# Patient Record
Sex: MALE | Race: WHITE | Employment: OTHER | URBAN - METROPOLITAN AREA
[De-identification: names, ages, dates, MRNs, and addresses within clinical notes are randomized per-mention and may not be internally consistent; named-entity substitution may affect disease eponyms.]

---

## 2019-01-01 ENCOUNTER — APPOINTMENT (OUTPATIENT)
Dept: GENERAL RADIOLOGY | Facility: CLINIC | Age: 84
DRG: 082 | End: 2019-01-01
Attending: INTERNAL MEDICINE
Payer: COMMERCIAL

## 2019-01-01 ENCOUNTER — APPOINTMENT (OUTPATIENT)
Dept: SPEECH THERAPY | Facility: CLINIC | Age: 84
DRG: 082 | End: 2019-01-01
Attending: HOSPITALIST
Payer: COMMERCIAL

## 2019-01-01 ENCOUNTER — APPOINTMENT (OUTPATIENT)
Dept: PHYSICAL THERAPY | Facility: CLINIC | Age: 84
DRG: 082 | End: 2019-01-01
Attending: HOSPITALIST
Payer: COMMERCIAL

## 2019-01-01 ENCOUNTER — APPOINTMENT (OUTPATIENT)
Dept: PHYSICAL THERAPY | Facility: CLINIC | Age: 84
DRG: 082 | End: 2019-01-01
Attending: PHYSICIAN ASSISTANT
Payer: COMMERCIAL

## 2019-01-01 ENCOUNTER — APPOINTMENT (OUTPATIENT)
Dept: OCCUPATIONAL THERAPY | Facility: CLINIC | Age: 84
DRG: 082 | End: 2019-01-01
Attending: HOSPITALIST
Payer: COMMERCIAL

## 2019-01-01 ENCOUNTER — APPOINTMENT (OUTPATIENT)
Dept: SPEECH THERAPY | Facility: CLINIC | Age: 84
DRG: 082 | End: 2019-01-01
Attending: PHYSICIAN ASSISTANT
Payer: COMMERCIAL

## 2019-01-01 ENCOUNTER — HOSPITAL ENCOUNTER (INPATIENT)
Facility: CLINIC | Age: 84
LOS: 1 days | DRG: 177 | End: 2019-06-02
Attending: EMERGENCY MEDICINE | Admitting: HOSPITALIST
Payer: COMMERCIAL

## 2019-01-01 ENCOUNTER — APPOINTMENT (OUTPATIENT)
Dept: OCCUPATIONAL THERAPY | Facility: CLINIC | Age: 84
DRG: 082 | End: 2019-01-01
Attending: PHYSICIAN ASSISTANT
Payer: COMMERCIAL

## 2019-01-01 ENCOUNTER — APPOINTMENT (OUTPATIENT)
Dept: CT IMAGING | Facility: CLINIC | Age: 84
DRG: 082 | End: 2019-01-01
Attending: PHYSICIAN ASSISTANT
Payer: COMMERCIAL

## 2019-01-01 ENCOUNTER — HOSPITAL ENCOUNTER (INPATIENT)
Facility: CLINIC | Age: 84
LOS: 15 days | Discharge: NURSING, REHAB OR LONG-TERM CARE | DRG: 082 | End: 2019-06-01
Attending: HOSPITALIST | Admitting: HOSPITALIST
Payer: COMMERCIAL

## 2019-01-01 ENCOUNTER — HOSPITAL ENCOUNTER (EMERGENCY)
Facility: CLINIC | Age: 84
Discharge: ANOTHER HOSPITAL | End: 2019-05-17
Attending: EMERGENCY MEDICINE | Admitting: EMERGENCY MEDICINE
Payer: COMMERCIAL

## 2019-01-01 ENCOUNTER — APPOINTMENT (OUTPATIENT)
Dept: INTERVENTIONAL RADIOLOGY/VASCULAR | Facility: CLINIC | Age: 84
DRG: 082 | End: 2019-01-01
Attending: INTERNAL MEDICINE
Payer: COMMERCIAL

## 2019-01-01 ENCOUNTER — APPOINTMENT (OUTPATIENT)
Dept: GENERAL RADIOLOGY | Facility: CLINIC | Age: 84
DRG: 177 | End: 2019-01-01
Attending: EMERGENCY MEDICINE
Payer: COMMERCIAL

## 2019-01-01 ENCOUNTER — APPOINTMENT (OUTPATIENT)
Dept: CT IMAGING | Facility: CLINIC | Age: 84
End: 2019-01-01
Attending: EMERGENCY MEDICINE
Payer: COMMERCIAL

## 2019-01-01 ENCOUNTER — APPOINTMENT (OUTPATIENT)
Dept: GENERAL RADIOLOGY | Facility: CLINIC | Age: 84
DRG: 082 | End: 2019-01-01
Attending: ORTHOPAEDIC SURGERY
Payer: COMMERCIAL

## 2019-01-01 ENCOUNTER — APPOINTMENT (OUTPATIENT)
Dept: CT IMAGING | Facility: CLINIC | Age: 84
DRG: 082 | End: 2019-01-01
Attending: HOSPITALIST
Payer: COMMERCIAL

## 2019-01-01 ENCOUNTER — APPOINTMENT (OUTPATIENT)
Dept: CT IMAGING | Facility: CLINIC | Age: 84
DRG: 082 | End: 2019-01-01
Attending: PSYCHIATRY & NEUROLOGY
Payer: COMMERCIAL

## 2019-01-01 VITALS
OXYGEN SATURATION: 100 % | RESPIRATION RATE: 8 BRPM | HEART RATE: 84 BPM | WEIGHT: 161.82 LBS | DIASTOLIC BLOOD PRESSURE: 54 MMHG | BODY MASS INDEX: 21.95 KG/M2 | TEMPERATURE: 98.4 F | SYSTOLIC BLOOD PRESSURE: 127 MMHG

## 2019-01-01 VITALS
TEMPERATURE: 97.7 F | WEIGHT: 165 LBS | RESPIRATION RATE: 23 BRPM | HEART RATE: 72 BPM | DIASTOLIC BLOOD PRESSURE: 84 MMHG | OXYGEN SATURATION: 92 % | SYSTOLIC BLOOD PRESSURE: 196 MMHG

## 2019-01-01 VITALS
OXYGEN SATURATION: 99 % | TEMPERATURE: 97.6 F | HEART RATE: 70 BPM | HEIGHT: 72 IN | SYSTOLIC BLOOD PRESSURE: 134 MMHG | BODY MASS INDEX: 21.8 KG/M2 | DIASTOLIC BLOOD PRESSURE: 67 MMHG | WEIGHT: 160.94 LBS | RESPIRATION RATE: 16 BRPM

## 2019-01-01 DIAGNOSIS — R06.03 RESPIRATORY DISTRESS: ICD-10-CM

## 2019-01-01 DIAGNOSIS — E11.9 DIABETES MELLITUS WITHOUT COMPLICATION (H): ICD-10-CM

## 2019-01-01 DIAGNOSIS — S06.5XAA SUBDURAL HEMATOMA (H): ICD-10-CM

## 2019-01-01 DIAGNOSIS — I10 BENIGN ESSENTIAL HYPERTENSION: ICD-10-CM

## 2019-01-01 DIAGNOSIS — M25.561 ACUTE PAIN OF RIGHT KNEE: ICD-10-CM

## 2019-01-01 DIAGNOSIS — I47.29 NON-SUSTAINED VENTRICULAR TACHYCARDIA (H): ICD-10-CM

## 2019-01-01 DIAGNOSIS — R09.02 HYPOXIA: ICD-10-CM

## 2019-01-01 DIAGNOSIS — S06.5XAA SUBDURAL HEMATOMA (H): Primary | ICD-10-CM

## 2019-01-01 LAB
ALBUMIN SERPL-MCNC: 2 G/DL (ref 3.4–5)
ALBUMIN SERPL-MCNC: 2.1 G/DL (ref 3.4–5)
ALBUMIN SERPL-MCNC: 2.7 G/DL (ref 3.4–5)
ALBUMIN SERPL-MCNC: 3.2 G/DL (ref 3.4–5)
ALBUMIN UR-MCNC: 200 MG/DL
ALP SERPL-CCNC: 151 U/L (ref 40–150)
ALP SERPL-CCNC: 64 U/L (ref 40–150)
ALP SERPL-CCNC: 82 U/L (ref 40–150)
ALT SERPL W P-5'-P-CCNC: 31 U/L (ref 0–70)
ALT SERPL W P-5'-P-CCNC: 31 U/L (ref 0–70)
ALT SERPL W P-5'-P-CCNC: 44 U/L (ref 0–70)
ANION GAP SERPL CALCULATED.3IONS-SCNC: 10 MMOL/L (ref 3–14)
ANION GAP SERPL CALCULATED.3IONS-SCNC: 10 MMOL/L (ref 3–14)
ANION GAP SERPL CALCULATED.3IONS-SCNC: 11 MMOL/L (ref 3–14)
ANION GAP SERPL CALCULATED.3IONS-SCNC: 12 MMOL/L (ref 3–14)
ANION GAP SERPL CALCULATED.3IONS-SCNC: 5 MMOL/L (ref 3–14)
ANION GAP SERPL CALCULATED.3IONS-SCNC: 6 MMOL/L (ref 3–14)
ANION GAP SERPL CALCULATED.3IONS-SCNC: 7 MMOL/L (ref 3–14)
ANION GAP SERPL CALCULATED.3IONS-SCNC: 7 MMOL/L (ref 3–14)
ANION GAP SERPL CALCULATED.3IONS-SCNC: 8 MMOL/L (ref 3–14)
APPEARANCE UR: CLEAR
APTT PPP: 39 SEC (ref 22–37)
AST SERPL W P-5'-P-CCNC: 35 U/L (ref 0–45)
AST SERPL W P-5'-P-CCNC: 38 U/L (ref 0–45)
AST SERPL W P-5'-P-CCNC: 93 U/L (ref 0–45)
BASOPHILS # BLD AUTO: 0 10E9/L (ref 0–0.2)
BASOPHILS NFR BLD AUTO: 0.1 %
BASOPHILS NFR BLD AUTO: 0.1 %
BASOPHILS NFR BLD AUTO: 0.2 %
BASOPHILS NFR BLD AUTO: 0.2 %
BILIRUB SERPL-MCNC: 1.7 MG/DL (ref 0.2–1.3)
BILIRUB SERPL-MCNC: 2.9 MG/DL (ref 0.2–1.3)
BILIRUB SERPL-MCNC: 3.4 MG/DL (ref 0.2–1.3)
BILIRUB UR QL STRIP: NEGATIVE
BUN SERPL-MCNC: 31 MG/DL (ref 7–30)
BUN SERPL-MCNC: 36 MG/DL (ref 7–30)
BUN SERPL-MCNC: 41 MG/DL (ref 7–30)
BUN SERPL-MCNC: 42 MG/DL (ref 7–30)
BUN SERPL-MCNC: 43 MG/DL (ref 7–30)
BUN SERPL-MCNC: 44 MG/DL (ref 7–30)
BUN SERPL-MCNC: 46 MG/DL (ref 7–30)
BUN SERPL-MCNC: 47 MG/DL (ref 7–30)
BUN SERPL-MCNC: 49 MG/DL (ref 7–30)
BUN SERPL-MCNC: 50 MG/DL (ref 7–30)
BUN SERPL-MCNC: 62 MG/DL (ref 7–30)
BUN SERPL-MCNC: 64 MG/DL (ref 7–30)
BUN SERPL-MCNC: 69 MG/DL (ref 7–30)
BUN SERPL-MCNC: 81 MG/DL (ref 7–30)
BUN SERPL-MCNC: 91 MG/DL (ref 7–30)
CALCIUM SERPL-MCNC: 8 MG/DL (ref 8.5–10.1)
CALCIUM SERPL-MCNC: 8 MG/DL (ref 8.5–10.1)
CALCIUM SERPL-MCNC: 8.1 MG/DL (ref 8.5–10.1)
CALCIUM SERPL-MCNC: 8.2 MG/DL (ref 8.5–10.1)
CALCIUM SERPL-MCNC: 8.4 MG/DL (ref 8.5–10.1)
CALCIUM SERPL-MCNC: 8.5 MG/DL (ref 8.5–10.1)
CALCIUM SERPL-MCNC: 8.5 MG/DL (ref 8.5–10.1)
CALCIUM SERPL-MCNC: 8.7 MG/DL (ref 8.5–10.1)
CALCIUM SERPL-MCNC: 8.7 MG/DL (ref 8.5–10.1)
CALCIUM SERPL-MCNC: 8.9 MG/DL (ref 8.5–10.1)
CALCIUM SERPL-MCNC: 9 MG/DL (ref 8.5–10.1)
CALCIUM SERPL-MCNC: 9.1 MG/DL (ref 8.5–10.1)
CALCIUM SERPL-MCNC: 9.7 MG/DL (ref 8.5–10.1)
CHLORIDE SERPL-SCNC: 103 MMOL/L (ref 94–109)
CHLORIDE SERPL-SCNC: 106 MMOL/L (ref 94–109)
CHLORIDE SERPL-SCNC: 108 MMOL/L (ref 94–109)
CHLORIDE SERPL-SCNC: 108 MMOL/L (ref 94–109)
CHLORIDE SERPL-SCNC: 111 MMOL/L (ref 94–109)
CHLORIDE SERPL-SCNC: 112 MMOL/L (ref 94–109)
CHLORIDE SERPL-SCNC: 114 MMOL/L (ref 94–109)
CHLORIDE SERPL-SCNC: 115 MMOL/L (ref 94–109)
CHLORIDE SERPL-SCNC: 117 MMOL/L (ref 94–109)
CHLORIDE SERPL-SCNC: 121 MMOL/L (ref 94–109)
CHLORIDE SERPL-SCNC: 122 MMOL/L (ref 94–109)
CHLORIDE SERPL-SCNC: 124 MMOL/L (ref 94–109)
CHLORIDE SERPL-SCNC: 126 MMOL/L (ref 94–109)
CK SERPL-CCNC: 2190 U/L (ref 30–300)
CK SERPL-CCNC: 824 U/L (ref 30–300)
CO2 BLDCOV-SCNC: 18 MMOL/L (ref 21–28)
CO2 BLDCOV-SCNC: 24 MMOL/L (ref 21–28)
CO2 SERPL-SCNC: 18 MMOL/L (ref 20–32)
CO2 SERPL-SCNC: 19 MMOL/L (ref 20–32)
CO2 SERPL-SCNC: 22 MMOL/L (ref 20–32)
CO2 SERPL-SCNC: 22 MMOL/L (ref 20–32)
CO2 SERPL-SCNC: 23 MMOL/L (ref 20–32)
CO2 SERPL-SCNC: 24 MMOL/L (ref 20–32)
CO2 SERPL-SCNC: 25 MMOL/L (ref 20–32)
CO2 SERPL-SCNC: 25 MMOL/L (ref 20–32)
CO2 SERPL-SCNC: 26 MMOL/L (ref 20–32)
CO2 SERPL-SCNC: 27 MMOL/L (ref 20–32)
CO2 SERPL-SCNC: 28 MMOL/L (ref 20–32)
COLOR UR AUTO: YELLOW
CREAT SERPL-MCNC: 1.71 MG/DL (ref 0.66–1.25)
CREAT SERPL-MCNC: 1.74 MG/DL (ref 0.66–1.25)
CREAT SERPL-MCNC: 1.77 MG/DL (ref 0.66–1.25)
CREAT SERPL-MCNC: 1.89 MG/DL (ref 0.66–1.25)
CREAT SERPL-MCNC: 1.96 MG/DL (ref 0.66–1.25)
CREAT SERPL-MCNC: 1.97 MG/DL (ref 0.66–1.25)
CREAT SERPL-MCNC: 1.98 MG/DL (ref 0.66–1.25)
CREAT SERPL-MCNC: 2.06 MG/DL (ref 0.66–1.25)
CREAT SERPL-MCNC: 2.1 MG/DL (ref 0.66–1.25)
CREAT SERPL-MCNC: 2.11 MG/DL (ref 0.66–1.25)
CREAT SERPL-MCNC: 2.15 MG/DL (ref 0.66–1.25)
CREAT SERPL-MCNC: 2.24 MG/DL (ref 0.66–1.25)
CREAT SERPL-MCNC: 2.26 MG/DL (ref 0.66–1.25)
CREAT SERPL-MCNC: 2.79 MG/DL (ref 0.66–1.25)
CREAT SERPL-MCNC: 3.13 MG/DL (ref 0.66–1.25)
DIFFERENTIAL METHOD BLD: ABNORMAL
EOSINOPHIL # BLD AUTO: 0 10E9/L (ref 0–0.7)
EOSINOPHIL # BLD AUTO: 0.1 10E9/L (ref 0–0.7)
EOSINOPHIL NFR BLD AUTO: 0 %
EOSINOPHIL NFR BLD AUTO: 1.2 %
ERYTHROCYTE [DISTWIDTH] IN BLOOD BY AUTOMATED COUNT: 13 % (ref 10–15)
ERYTHROCYTE [DISTWIDTH] IN BLOOD BY AUTOMATED COUNT: 13.1 % (ref 10–15)
ERYTHROCYTE [DISTWIDTH] IN BLOOD BY AUTOMATED COUNT: 13.6 % (ref 10–15)
ERYTHROCYTE [DISTWIDTH] IN BLOOD BY AUTOMATED COUNT: 13.7 % (ref 10–15)
ERYTHROCYTE [DISTWIDTH] IN BLOOD BY AUTOMATED COUNT: 13.8 % (ref 10–15)
ERYTHROCYTE [DISTWIDTH] IN BLOOD BY AUTOMATED COUNT: 13.9 % (ref 10–15)
ERYTHROCYTE [DISTWIDTH] IN BLOOD BY AUTOMATED COUNT: 14.3 % (ref 10–15)
ERYTHROCYTE [DISTWIDTH] IN BLOOD BY AUTOMATED COUNT: 14.4 % (ref 10–15)
GFR SERPL CREATININE-BSD FRML MDRD: 17 ML/MIN/{1.73_M2}
GFR SERPL CREATININE-BSD FRML MDRD: 19 ML/MIN/{1.73_M2}
GFR SERPL CREATININE-BSD FRML MDRD: 25 ML/MIN/{1.73_M2}
GFR SERPL CREATININE-BSD FRML MDRD: 25 ML/MIN/{1.73_M2}
GFR SERPL CREATININE-BSD FRML MDRD: 26 ML/MIN/{1.73_M2}
GFR SERPL CREATININE-BSD FRML MDRD: 27 ML/MIN/{1.73_M2}
GFR SERPL CREATININE-BSD FRML MDRD: 27 ML/MIN/{1.73_M2}
GFR SERPL CREATININE-BSD FRML MDRD: 28 ML/MIN/{1.73_M2}
GFR SERPL CREATININE-BSD FRML MDRD: 29 ML/MIN/{1.73_M2}
GFR SERPL CREATININE-BSD FRML MDRD: 31 ML/MIN/{1.73_M2}
GFR SERPL CREATININE-BSD FRML MDRD: 33 ML/MIN/{1.73_M2}
GFR SERPL CREATININE-BSD FRML MDRD: 34 ML/MIN/{1.73_M2}
GFR SERPL CREATININE-BSD FRML MDRD: 35 ML/MIN/{1.73_M2}
GLUCOSE BLDC GLUCOMTR-MCNC: 104 MG/DL (ref 70–99)
GLUCOSE BLDC GLUCOMTR-MCNC: 107 MG/DL (ref 70–99)
GLUCOSE BLDC GLUCOMTR-MCNC: 111 MG/DL (ref 70–99)
GLUCOSE BLDC GLUCOMTR-MCNC: 113 MG/DL (ref 70–99)
GLUCOSE BLDC GLUCOMTR-MCNC: 123 MG/DL (ref 70–99)
GLUCOSE BLDC GLUCOMTR-MCNC: 124 MG/DL (ref 70–99)
GLUCOSE BLDC GLUCOMTR-MCNC: 130 MG/DL (ref 70–99)
GLUCOSE BLDC GLUCOMTR-MCNC: 132 MG/DL (ref 70–99)
GLUCOSE BLDC GLUCOMTR-MCNC: 133 MG/DL (ref 70–99)
GLUCOSE BLDC GLUCOMTR-MCNC: 151 MG/DL (ref 70–99)
GLUCOSE BLDC GLUCOMTR-MCNC: 152 MG/DL (ref 70–99)
GLUCOSE BLDC GLUCOMTR-MCNC: 155 MG/DL (ref 70–99)
GLUCOSE BLDC GLUCOMTR-MCNC: 156 MG/DL (ref 70–99)
GLUCOSE BLDC GLUCOMTR-MCNC: 156 MG/DL (ref 70–99)
GLUCOSE BLDC GLUCOMTR-MCNC: 163 MG/DL (ref 70–99)
GLUCOSE BLDC GLUCOMTR-MCNC: 165 MG/DL (ref 70–99)
GLUCOSE BLDC GLUCOMTR-MCNC: 166 MG/DL (ref 70–99)
GLUCOSE BLDC GLUCOMTR-MCNC: 166 MG/DL (ref 70–99)
GLUCOSE BLDC GLUCOMTR-MCNC: 171 MG/DL (ref 70–99)
GLUCOSE BLDC GLUCOMTR-MCNC: 172 MG/DL (ref 70–99)
GLUCOSE BLDC GLUCOMTR-MCNC: 173 MG/DL (ref 70–99)
GLUCOSE BLDC GLUCOMTR-MCNC: 173 MG/DL (ref 70–99)
GLUCOSE BLDC GLUCOMTR-MCNC: 175 MG/DL (ref 70–99)
GLUCOSE BLDC GLUCOMTR-MCNC: 178 MG/DL (ref 70–99)
GLUCOSE BLDC GLUCOMTR-MCNC: 181 MG/DL (ref 70–99)
GLUCOSE BLDC GLUCOMTR-MCNC: 184 MG/DL (ref 70–99)
GLUCOSE BLDC GLUCOMTR-MCNC: 184 MG/DL (ref 70–99)
GLUCOSE BLDC GLUCOMTR-MCNC: 185 MG/DL (ref 70–99)
GLUCOSE BLDC GLUCOMTR-MCNC: 187 MG/DL (ref 70–99)
GLUCOSE BLDC GLUCOMTR-MCNC: 188 MG/DL (ref 70–99)
GLUCOSE BLDC GLUCOMTR-MCNC: 192 MG/DL (ref 70–99)
GLUCOSE BLDC GLUCOMTR-MCNC: 193 MG/DL (ref 70–99)
GLUCOSE BLDC GLUCOMTR-MCNC: 195 MG/DL (ref 70–99)
GLUCOSE BLDC GLUCOMTR-MCNC: 199 MG/DL (ref 70–99)
GLUCOSE BLDC GLUCOMTR-MCNC: 201 MG/DL (ref 70–99)
GLUCOSE BLDC GLUCOMTR-MCNC: 202 MG/DL (ref 70–99)
GLUCOSE BLDC GLUCOMTR-MCNC: 203 MG/DL (ref 70–99)
GLUCOSE BLDC GLUCOMTR-MCNC: 203 MG/DL (ref 70–99)
GLUCOSE BLDC GLUCOMTR-MCNC: 204 MG/DL (ref 70–99)
GLUCOSE BLDC GLUCOMTR-MCNC: 208 MG/DL (ref 70–99)
GLUCOSE BLDC GLUCOMTR-MCNC: 211 MG/DL (ref 70–99)
GLUCOSE BLDC GLUCOMTR-MCNC: 215 MG/DL (ref 70–99)
GLUCOSE BLDC GLUCOMTR-MCNC: 215 MG/DL (ref 70–99)
GLUCOSE BLDC GLUCOMTR-MCNC: 216 MG/DL (ref 70–99)
GLUCOSE BLDC GLUCOMTR-MCNC: 218 MG/DL (ref 70–99)
GLUCOSE BLDC GLUCOMTR-MCNC: 221 MG/DL (ref 70–99)
GLUCOSE BLDC GLUCOMTR-MCNC: 222 MG/DL (ref 70–99)
GLUCOSE BLDC GLUCOMTR-MCNC: 224 MG/DL (ref 70–99)
GLUCOSE BLDC GLUCOMTR-MCNC: 225 MG/DL (ref 70–99)
GLUCOSE BLDC GLUCOMTR-MCNC: 227 MG/DL (ref 70–99)
GLUCOSE BLDC GLUCOMTR-MCNC: 230 MG/DL (ref 70–99)
GLUCOSE BLDC GLUCOMTR-MCNC: 231 MG/DL (ref 70–99)
GLUCOSE BLDC GLUCOMTR-MCNC: 232 MG/DL (ref 70–99)
GLUCOSE BLDC GLUCOMTR-MCNC: 232 MG/DL (ref 70–99)
GLUCOSE BLDC GLUCOMTR-MCNC: 239 MG/DL (ref 70–99)
GLUCOSE BLDC GLUCOMTR-MCNC: 239 MG/DL (ref 70–99)
GLUCOSE BLDC GLUCOMTR-MCNC: 240 MG/DL (ref 70–99)
GLUCOSE BLDC GLUCOMTR-MCNC: 245 MG/DL (ref 70–99)
GLUCOSE BLDC GLUCOMTR-MCNC: 247 MG/DL (ref 70–99)
GLUCOSE BLDC GLUCOMTR-MCNC: 248 MG/DL (ref 70–99)
GLUCOSE BLDC GLUCOMTR-MCNC: 253 MG/DL (ref 70–99)
GLUCOSE BLDC GLUCOMTR-MCNC: 254 MG/DL (ref 70–99)
GLUCOSE BLDC GLUCOMTR-MCNC: 257 MG/DL (ref 70–99)
GLUCOSE BLDC GLUCOMTR-MCNC: 262 MG/DL (ref 70–99)
GLUCOSE BLDC GLUCOMTR-MCNC: 263 MG/DL (ref 70–99)
GLUCOSE BLDC GLUCOMTR-MCNC: 266 MG/DL (ref 70–99)
GLUCOSE BLDC GLUCOMTR-MCNC: 269 MG/DL (ref 70–99)
GLUCOSE BLDC GLUCOMTR-MCNC: 273 MG/DL (ref 70–99)
GLUCOSE BLDC GLUCOMTR-MCNC: 275 MG/DL (ref 70–99)
GLUCOSE BLDC GLUCOMTR-MCNC: 280 MG/DL (ref 70–99)
GLUCOSE BLDC GLUCOMTR-MCNC: 286 MG/DL (ref 70–99)
GLUCOSE BLDC GLUCOMTR-MCNC: 297 MG/DL (ref 70–99)
GLUCOSE BLDC GLUCOMTR-MCNC: 304 MG/DL (ref 70–99)
GLUCOSE BLDC GLUCOMTR-MCNC: 314 MG/DL (ref 70–99)
GLUCOSE BLDC GLUCOMTR-MCNC: 317 MG/DL (ref 70–99)
GLUCOSE BLDC GLUCOMTR-MCNC: 336 MG/DL (ref 70–99)
GLUCOSE BLDC GLUCOMTR-MCNC: 338 MG/DL (ref 70–99)
GLUCOSE BLDC GLUCOMTR-MCNC: 342 MG/DL (ref 70–99)
GLUCOSE BLDC GLUCOMTR-MCNC: 66 MG/DL (ref 70–99)
GLUCOSE BLDC GLUCOMTR-MCNC: 71 MG/DL (ref 70–99)
GLUCOSE BLDC GLUCOMTR-MCNC: 80 MG/DL (ref 70–99)
GLUCOSE BLDC GLUCOMTR-MCNC: 88 MG/DL (ref 70–99)
GLUCOSE BLDC GLUCOMTR-MCNC: 92 MG/DL (ref 70–99)
GLUCOSE BLDC GLUCOMTR-MCNC: 97 MG/DL (ref 70–99)
GLUCOSE SERPL-MCNC: 121 MG/DL (ref 70–99)
GLUCOSE SERPL-MCNC: 133 MG/DL (ref 70–99)
GLUCOSE SERPL-MCNC: 135 MG/DL (ref 70–99)
GLUCOSE SERPL-MCNC: 158 MG/DL (ref 70–99)
GLUCOSE SERPL-MCNC: 162 MG/DL (ref 70–99)
GLUCOSE SERPL-MCNC: 166 MG/DL (ref 70–99)
GLUCOSE SERPL-MCNC: 170 MG/DL (ref 70–99)
GLUCOSE SERPL-MCNC: 203 MG/DL (ref 70–99)
GLUCOSE SERPL-MCNC: 241 MG/DL (ref 70–99)
GLUCOSE SERPL-MCNC: 252 MG/DL (ref 70–99)
GLUCOSE SERPL-MCNC: 264 MG/DL (ref 70–99)
GLUCOSE SERPL-MCNC: 292 MG/DL (ref 70–99)
GLUCOSE SERPL-MCNC: 303 MG/DL (ref 70–99)
GLUCOSE SERPL-MCNC: 327 MG/DL (ref 70–99)
GLUCOSE SERPL-MCNC: 389 MG/DL (ref 70–99)
GLUCOSE UR STRIP-MCNC: >1000 MG/DL
HBA1C MFR BLD: 7.9 % (ref 0–5.6)
HBA1C MFR BLD: 8.5 % (ref 0–5.6)
HCT VFR BLD AUTO: 30.3 % (ref 40–53)
HCT VFR BLD AUTO: 31.8 % (ref 40–53)
HCT VFR BLD AUTO: 33.8 % (ref 40–53)
HCT VFR BLD AUTO: 34.4 % (ref 40–53)
HCT VFR BLD AUTO: 34.6 % (ref 40–53)
HCT VFR BLD AUTO: 38.1 % (ref 40–53)
HCT VFR BLD AUTO: 38.9 % (ref 40–53)
HCT VFR BLD AUTO: 44.9 % (ref 40–53)
HEMOCCULT STL QL: POSITIVE
HGB BLD-MCNC: 10.3 G/DL (ref 13.3–17.7)
HGB BLD-MCNC: 10.6 G/DL (ref 13.3–17.7)
HGB BLD-MCNC: 11.6 G/DL (ref 13.3–17.7)
HGB BLD-MCNC: 11.7 G/DL (ref 13.3–17.7)
HGB BLD-MCNC: 12 G/DL (ref 13.3–17.7)
HGB BLD-MCNC: 12.8 G/DL (ref 13.3–17.7)
HGB BLD-MCNC: 12.8 G/DL (ref 13.3–17.7)
HGB BLD-MCNC: 15.4 G/DL (ref 13.3–17.7)
HGB UR QL STRIP: ABNORMAL
HYALINE CASTS #/AREA URNS LPF: 1 /LPF (ref 0–2)
IMM GRANULOCYTES # BLD: 0.1 10E9/L (ref 0–0.4)
IMM GRANULOCYTES NFR BLD: 0.3 %
IMM GRANULOCYTES NFR BLD: 0.4 %
IMM GRANULOCYTES NFR BLD: 0.7 %
IMM GRANULOCYTES NFR BLD: 0.9 %
INR PPP: 1.08 (ref 0.86–1.14)
INR PPP: 1.17 (ref 0.86–1.14)
INR PPP: 1.21 (ref 0.86–1.14)
INR PPP: 1.21 (ref 0.86–1.14)
INR PPP: 2.48 (ref 0.86–1.14)
INTERPRETATION ECG - MUSE: NORMAL
INTERPRETATION ECG - MUSE: NORMAL
KETONES UR STRIP-MCNC: ABNORMAL MG/DL
LACTATE BLD-SCNC: 1 MMOL/L (ref 0.7–2)
LACTATE BLD-SCNC: 2.2 MMOL/L (ref 0.7–2.1)
LACTATE BLD-SCNC: 4.1 MMOL/L (ref 0.7–2)
LACTATE BLD-SCNC: 4.5 MMOL/L (ref 0.7–2.1)
LEUKOCYTE ESTERASE UR QL STRIP: NEGATIVE
LYMPHOCYTES # BLD AUTO: 0.2 10E9/L (ref 0.8–5.3)
LYMPHOCYTES # BLD AUTO: 0.5 10E9/L (ref 0.8–5.3)
LYMPHOCYTES # BLD AUTO: 0.7 10E9/L (ref 0.8–5.3)
LYMPHOCYTES # BLD AUTO: 1.1 10E9/L (ref 0.8–5.3)
LYMPHOCYTES NFR BLD AUTO: 1.9 %
LYMPHOCYTES NFR BLD AUTO: 11.8 %
LYMPHOCYTES NFR BLD AUTO: 2.5 %
LYMPHOCYTES NFR BLD AUTO: 4.5 %
MAGNESIUM SERPL-MCNC: 2.1 MG/DL (ref 1.6–2.3)
MAGNESIUM SERPL-MCNC: 2.3 MG/DL (ref 1.6–2.3)
MAGNESIUM SERPL-MCNC: 2.4 MG/DL (ref 1.6–2.3)
MCH RBC QN AUTO: 30.2 PG (ref 26.5–33)
MCH RBC QN AUTO: 30.4 PG (ref 26.5–33)
MCH RBC QN AUTO: 30.4 PG (ref 26.5–33)
MCH RBC QN AUTO: 30.5 PG (ref 26.5–33)
MCH RBC QN AUTO: 30.6 PG (ref 26.5–33)
MCH RBC QN AUTO: 30.6 PG (ref 26.5–33)
MCH RBC QN AUTO: 30.9 PG (ref 26.5–33)
MCH RBC QN AUTO: 30.9 PG (ref 26.5–33)
MCHC RBC AUTO-ENTMCNC: 32.9 G/DL (ref 31.5–36.5)
MCHC RBC AUTO-ENTMCNC: 33.3 G/DL (ref 31.5–36.5)
MCHC RBC AUTO-ENTMCNC: 33.6 G/DL (ref 31.5–36.5)
MCHC RBC AUTO-ENTMCNC: 33.7 G/DL (ref 31.5–36.5)
MCHC RBC AUTO-ENTMCNC: 34 G/DL (ref 31.5–36.5)
MCHC RBC AUTO-ENTMCNC: 34.3 G/DL (ref 31.5–36.5)
MCHC RBC AUTO-ENTMCNC: 34.6 G/DL (ref 31.5–36.5)
MCHC RBC AUTO-ENTMCNC: 34.7 G/DL (ref 31.5–36.5)
MCV RBC AUTO: 88 FL (ref 78–100)
MCV RBC AUTO: 89 FL (ref 78–100)
MCV RBC AUTO: 90 FL (ref 78–100)
MCV RBC AUTO: 91 FL (ref 78–100)
MCV RBC AUTO: 91 FL (ref 78–100)
MCV RBC AUTO: 92 FL (ref 78–100)
MONOCYTES # BLD AUTO: 0.2 10E9/L (ref 0–1.3)
MONOCYTES # BLD AUTO: 0.6 10E9/L (ref 0–1.3)
MONOCYTES # BLD AUTO: 1.3 10E9/L (ref 0–1.3)
MONOCYTES # BLD AUTO: 1.5 10E9/L (ref 0–1.3)
MONOCYTES NFR BLD AUTO: 1.6 %
MONOCYTES NFR BLD AUTO: 6.2 %
MONOCYTES NFR BLD AUTO: 7.7 %
MONOCYTES NFR BLD AUTO: 8.8 %
MRSA DNA SPEC QL NAA+PROBE: NEGATIVE
MUCOUS THREADS #/AREA URNS LPF: PRESENT /LPF
NEUTROPHILS # BLD AUTO: 11.1 10E9/L (ref 1.6–8.3)
NEUTROPHILS # BLD AUTO: 12.5 10E9/L (ref 1.6–8.3)
NEUTROPHILS # BLD AUTO: 16.8 10E9/L (ref 1.6–8.3)
NEUTROPHILS # BLD AUTO: 7.7 10E9/L (ref 1.6–8.3)
NEUTROPHILS NFR BLD AUTO: 79.7 %
NEUTROPHILS NFR BLD AUTO: 86.3 %
NEUTROPHILS NFR BLD AUTO: 88.9 %
NEUTROPHILS NFR BLD AUTO: 96 %
NITRATE UR QL: NEGATIVE
NRBC # BLD AUTO: 0 10*3/UL
NRBC BLD AUTO-RTO: 0 /100
NT-PROBNP SERPL-MCNC: 8100 PG/ML (ref 0–1800)
OSMOLALITY UR: 562 MMOL/KG (ref 100–1200)
PCO2 BLDV: 38 MM HG (ref 40–50)
PCO2 BLDV: 38 MM HG (ref 40–50)
PH BLDV: 7.3 PH (ref 7.32–7.43)
PH BLDV: 7.4 PH (ref 7.32–7.43)
PH UR STRIP: 5.5 PH (ref 5–7)
PHOSPHATE SERPL-MCNC: 3.1 MG/DL (ref 2.5–4.5)
PHOSPHATE SERPL-MCNC: 3.5 MG/DL (ref 2.5–4.5)
PLATELET # BLD AUTO: 129 10E9/L (ref 150–450)
PLATELET # BLD AUTO: 133 10E9/L (ref 150–450)
PLATELET # BLD AUTO: 141 10E9/L (ref 150–450)
PLATELET # BLD AUTO: 142 10E9/L (ref 150–450)
PLATELET # BLD AUTO: 156 10E9/L (ref 150–450)
PLATELET # BLD AUTO: 192 10E9/L (ref 150–450)
PLATELET # BLD AUTO: 207 10E9/L (ref 150–450)
PLATELET # BLD AUTO: 329 10E9/L (ref 150–450)
PO2 BLDV: 23 MM HG (ref 25–47)
PO2 BLDV: 37 MM HG (ref 25–47)
POTASSIUM SERPL-SCNC: 3.6 MMOL/L (ref 3.4–5.3)
POTASSIUM SERPL-SCNC: 3.7 MMOL/L (ref 3.4–5.3)
POTASSIUM SERPL-SCNC: 3.8 MMOL/L (ref 3.4–5.3)
POTASSIUM SERPL-SCNC: 3.9 MMOL/L (ref 3.4–5.3)
POTASSIUM SERPL-SCNC: 3.9 MMOL/L (ref 3.4–5.3)
POTASSIUM SERPL-SCNC: 4 MMOL/L (ref 3.4–5.3)
POTASSIUM SERPL-SCNC: 4.1 MMOL/L (ref 3.4–5.3)
POTASSIUM SERPL-SCNC: 4.1 MMOL/L (ref 3.4–5.3)
POTASSIUM SERPL-SCNC: 4.2 MMOL/L (ref 3.4–5.3)
POTASSIUM SERPL-SCNC: 4.9 MMOL/L (ref 3.4–5.3)
POTASSIUM SERPL-SCNC: 5.3 MMOL/L (ref 3.4–5.3)
PROT SERPL-MCNC: 6 G/DL (ref 6.8–8.8)
PROT SERPL-MCNC: 7.4 G/DL (ref 6.8–8.8)
PROT SERPL-MCNC: 8 G/DL (ref 6.8–8.8)
RBC # BLD AUTO: 3.37 10E12/L (ref 4.4–5.9)
RBC # BLD AUTO: 3.49 10E12/L (ref 4.4–5.9)
RBC # BLD AUTO: 3.79 10E12/L (ref 4.4–5.9)
RBC # BLD AUTO: 3.81 10E12/L (ref 4.4–5.9)
RBC # BLD AUTO: 3.92 10E12/L (ref 4.4–5.9)
RBC # BLD AUTO: 4.19 10E12/L (ref 4.4–5.9)
RBC # BLD AUTO: 4.24 10E12/L (ref 4.4–5.9)
RBC # BLD AUTO: 4.98 10E12/L (ref 4.4–5.9)
RBC #/AREA URNS AUTO: 3 /HPF (ref 0–2)
SAO2 % BLDV FROM PO2: 42 %
SAO2 % BLDV FROM PO2: 64 %
SODIUM SERPL-SCNC: 136 MMOL/L (ref 133–144)
SODIUM SERPL-SCNC: 140 MMOL/L (ref 133–144)
SODIUM SERPL-SCNC: 140 MMOL/L (ref 133–144)
SODIUM SERPL-SCNC: 141 MMOL/L (ref 133–144)
SODIUM SERPL-SCNC: 141 MMOL/L (ref 133–144)
SODIUM SERPL-SCNC: 144 MMOL/L (ref 133–144)
SODIUM SERPL-SCNC: 144 MMOL/L (ref 133–144)
SODIUM SERPL-SCNC: 145 MMOL/L (ref 133–144)
SODIUM SERPL-SCNC: 146 MMOL/L (ref 133–144)
SODIUM SERPL-SCNC: 146 MMOL/L (ref 133–144)
SODIUM SERPL-SCNC: 147 MMOL/L (ref 133–144)
SODIUM SERPL-SCNC: 148 MMOL/L (ref 133–144)
SODIUM SERPL-SCNC: 148 MMOL/L (ref 133–144)
SODIUM SERPL-SCNC: 149 MMOL/L (ref 133–144)
SODIUM SERPL-SCNC: 150 MMOL/L (ref 133–144)
SODIUM SERPL-SCNC: 151 MMOL/L (ref 133–144)
SODIUM SERPL-SCNC: 152 MMOL/L (ref 133–144)
SODIUM SERPL-SCNC: 154 MMOL/L (ref 133–144)
SODIUM SERPL-SCNC: 155 MMOL/L (ref 133–144)
SODIUM SERPL-SCNC: 156 MMOL/L (ref 133–144)
SODIUM UR-SCNC: 83 MMOL/L
SOURCE: ABNORMAL
SP GR UR STRIP: 1.02 (ref 1–1.03)
SPECIMEN SOURCE: NORMAL
SQUAMOUS #/AREA URNS AUTO: <1 /HPF (ref 0–1)
TROPONIN I BLD-MCNC: 0.15 UG/L (ref 0–0.08)
TROPONIN I SERPL-MCNC: 0.18 UG/L (ref 0–0.04)
TSH SERPL DL<=0.005 MIU/L-ACNC: 2.42 MU/L (ref 0.4–4)
UROBILINOGEN UR STRIP-MCNC: NORMAL MG/DL (ref 0–2)
WBC # BLD AUTO: 10.7 10E9/L (ref 4–11)
WBC # BLD AUTO: 11.4 10E9/L (ref 4–11)
WBC # BLD AUTO: 14.5 10E9/L (ref 4–11)
WBC # BLD AUTO: 18.9 10E9/L (ref 4–11)
WBC # BLD AUTO: 8.5 10E9/L (ref 4–11)
WBC # BLD AUTO: 9.1 10E9/L (ref 4–11)
WBC # BLD AUTO: 9.6 10E9/L (ref 4–11)
WBC # BLD AUTO: 9.8 10E9/L (ref 4–11)
WBC #/AREA URNS AUTO: 2 /HPF (ref 0–5)

## 2019-01-01 PROCEDURE — 25000132 ZZH RX MED GY IP 250 OP 250 PS 637: Performed by: HOSPITALIST

## 2019-01-01 PROCEDURE — 25800025 ZZH RX 258: Performed by: INTERNAL MEDICINE

## 2019-01-01 PROCEDURE — 97530 THERAPEUTIC ACTIVITIES: CPT | Mod: GO

## 2019-01-01 PROCEDURE — 36415 COLL VENOUS BLD VENIPUNCTURE: CPT | Performed by: INTERNAL MEDICINE

## 2019-01-01 PROCEDURE — 27210429 ZZH NUTRITION PRODUCT INTERMEDIATE LITER

## 2019-01-01 PROCEDURE — 25000132 ZZH RX MED GY IP 250 OP 250 PS 637: Performed by: INTERNAL MEDICINE

## 2019-01-01 PROCEDURE — 27210915 ZZ H TUBE GASTRO CR4

## 2019-01-01 PROCEDURE — 97110 THERAPEUTIC EXERCISES: CPT | Mod: GP

## 2019-01-01 PROCEDURE — 12000000 ZZH R&B MED SURG/OB

## 2019-01-01 PROCEDURE — 25000131 ZZH RX MED GY IP 250 OP 636 PS 637: Performed by: INTERNAL MEDICINE

## 2019-01-01 PROCEDURE — 25800030 ZZH RX IP 258 OP 636: Performed by: INTERNAL MEDICINE

## 2019-01-01 PROCEDURE — C1769 GUIDE WIRE: HCPCS

## 2019-01-01 PROCEDURE — 83880 ASSAY OF NATRIURETIC PEPTIDE: CPT | Performed by: EMERGENCY MEDICINE

## 2019-01-01 PROCEDURE — 99233 SBSQ HOSP IP/OBS HIGH 50: CPT | Performed by: INTERNAL MEDICINE

## 2019-01-01 PROCEDURE — 80048 BASIC METABOLIC PNL TOTAL CA: CPT | Performed by: INTERNAL MEDICINE

## 2019-01-01 PROCEDURE — 27210905 ZZH KIT CR7

## 2019-01-01 PROCEDURE — 99232 SBSQ HOSP IP/OBS MODERATE 35: CPT | Performed by: NEUROLOGICAL SURGERY

## 2019-01-01 PROCEDURE — 25800030 ZZH RX IP 258 OP 636: Performed by: HOSPITALIST

## 2019-01-01 PROCEDURE — 83605 ASSAY OF LACTIC ACID: CPT | Mod: 91

## 2019-01-01 PROCEDURE — 80053 COMPREHEN METABOLIC PANEL: CPT | Performed by: EMERGENCY MEDICINE

## 2019-01-01 PROCEDURE — 92526 ORAL FUNCTION THERAPY: CPT | Mod: GN | Performed by: SPEECH-LANGUAGE PATHOLOGIST

## 2019-01-01 PROCEDURE — 97112 NEUROMUSCULAR REEDUCATION: CPT | Mod: GP

## 2019-01-01 PROCEDURE — 25000125 ZZHC RX 250: Performed by: EMERGENCY MEDICINE

## 2019-01-01 PROCEDURE — 99291 CRITICAL CARE FIRST HOUR: CPT | Mod: 25

## 2019-01-01 PROCEDURE — 36415 COLL VENOUS BLD VENIPUNCTURE: CPT | Performed by: PHYSICIAN ASSISTANT

## 2019-01-01 PROCEDURE — 25000128 H RX IP 250 OP 636

## 2019-01-01 PROCEDURE — 97530 THERAPEUTIC ACTIVITIES: CPT | Mod: GP

## 2019-01-01 PROCEDURE — 99239 HOSP IP/OBS DSCHRG MGMT >30: CPT | Performed by: INTERNAL MEDICINE

## 2019-01-01 PROCEDURE — 44500 INTRO GASTROINTESTINAL TUBE: CPT

## 2019-01-01 PROCEDURE — 25000555 ZZHC RX FACTOR IP 250 OP 636: Performed by: EMERGENCY MEDICINE

## 2019-01-01 PROCEDURE — 27210742 ZZH CATH CR1

## 2019-01-01 PROCEDURE — 97535 SELF CARE MNGMENT TRAINING: CPT | Mod: GO | Performed by: OCCUPATIONAL THERAPY ASSISTANT

## 2019-01-01 PROCEDURE — 96375 TX/PRO/DX INJ NEW DRUG ADDON: CPT

## 2019-01-01 PROCEDURE — 84443 ASSAY THYROID STIM HORMONE: CPT | Performed by: EMERGENCY MEDICINE

## 2019-01-01 PROCEDURE — 97530 THERAPEUTIC ACTIVITIES: CPT | Mod: GO | Performed by: OCCUPATIONAL THERAPY ASSISTANT

## 2019-01-01 PROCEDURE — 00000146 ZZHCL STATISTIC GLUCOSE BY METER IP

## 2019-01-01 PROCEDURE — 85027 COMPLETE CBC AUTOMATED: CPT | Performed by: INTERNAL MEDICINE

## 2019-01-01 PROCEDURE — 27210735 ZZH ACCESSORY CR12

## 2019-01-01 PROCEDURE — 74230 X-RAY XM SWLNG FUNCJ C+: CPT

## 2019-01-01 PROCEDURE — 97110 THERAPEUTIC EXERCISES: CPT | Mod: GO | Performed by: OCCUPATIONAL THERAPY ASSISTANT

## 2019-01-01 PROCEDURE — 99223 1ST HOSP IP/OBS HIGH 75: CPT | Mod: AI | Performed by: HOSPITALIST

## 2019-01-01 PROCEDURE — 85025 COMPLETE CBC W/AUTO DIFF WBC: CPT | Performed by: PHYSICIAN ASSISTANT

## 2019-01-01 PROCEDURE — 25000128 H RX IP 250 OP 636: Performed by: EMERGENCY MEDICINE

## 2019-01-01 PROCEDURE — 85610 PROTHROMBIN TIME: CPT | Performed by: INTERNAL MEDICINE

## 2019-01-01 PROCEDURE — 97535 SELF CARE MNGMENT TRAINING: CPT | Mod: GO

## 2019-01-01 PROCEDURE — 25000128 H RX IP 250 OP 636: Performed by: HOSPITALIST

## 2019-01-01 PROCEDURE — 73560 X-RAY EXAM OF KNEE 1 OR 2: CPT | Mod: RT

## 2019-01-01 PROCEDURE — 40000986 XR ABDOMEN 1 VW

## 2019-01-01 PROCEDURE — 84100 ASSAY OF PHOSPHORUS: CPT | Performed by: INTERNAL MEDICINE

## 2019-01-01 PROCEDURE — 25000125 ZZHC RX 250

## 2019-01-01 PROCEDURE — C9132 KCENTRA, PER I.U.: HCPCS | Performed by: EMERGENCY MEDICINE

## 2019-01-01 PROCEDURE — 40000141 ZZH STATISTIC PERIPHERAL IV START W/O US GUIDANCE

## 2019-01-01 PROCEDURE — 97112 NEUROMUSCULAR REEDUCATION: CPT | Mod: GO

## 2019-01-01 PROCEDURE — 99238 HOSP IP/OBS DSCHRG MGMT 30/<: CPT | Performed by: NURSE PRACTITIONER

## 2019-01-01 PROCEDURE — 0DH63UZ INSERTION OF FEEDING DEVICE INTO STOMACH, PERCUTANEOUS APPROACH: ICD-10-PCS | Performed by: RADIOLOGY

## 2019-01-01 PROCEDURE — 94660 CPAP INITIATION&MGMT: CPT

## 2019-01-01 PROCEDURE — 97535 SELF CARE MNGMENT TRAINING: CPT | Mod: GO | Performed by: OCCUPATIONAL THERAPIST

## 2019-01-01 PROCEDURE — 92526 ORAL FUNCTION THERAPY: CPT | Mod: GN

## 2019-01-01 PROCEDURE — 25000128 H RX IP 250 OP 636: Performed by: RADIOLOGY

## 2019-01-01 PROCEDURE — 83735 ASSAY OF MAGNESIUM: CPT | Performed by: INTERNAL MEDICINE

## 2019-01-01 PROCEDURE — 85025 COMPLETE CBC W/AUTO DIFF WBC: CPT | Performed by: EMERGENCY MEDICINE

## 2019-01-01 PROCEDURE — 70450 CT HEAD/BRAIN W/O DYE: CPT

## 2019-01-01 PROCEDURE — 83935 ASSAY OF URINE OSMOLALITY: CPT | Performed by: INTERNAL MEDICINE

## 2019-01-01 PROCEDURE — 93005 ELECTROCARDIOGRAM TRACING: CPT

## 2019-01-01 PROCEDURE — 81001 URINALYSIS AUTO W/SCOPE: CPT | Performed by: EMERGENCY MEDICINE

## 2019-01-01 PROCEDURE — 20000003 ZZH R&B ICU

## 2019-01-01 PROCEDURE — 25000131 ZZH RX MED GY IP 250 OP 636 PS 637: Performed by: PHYSICIAN ASSISTANT

## 2019-01-01 PROCEDURE — 84300 ASSAY OF URINE SODIUM: CPT | Performed by: INTERNAL MEDICINE

## 2019-01-01 PROCEDURE — 83605 ASSAY OF LACTIC ACID: CPT

## 2019-01-01 PROCEDURE — 84295 ASSAY OF SERUM SODIUM: CPT | Performed by: INTERNAL MEDICINE

## 2019-01-01 PROCEDURE — 99285 EMERGENCY DEPT VISIT HI MDM: CPT | Mod: 25

## 2019-01-01 PROCEDURE — 97530 THERAPEUTIC ACTIVITIES: CPT | Mod: GP | Performed by: PHYSICAL THERAPIST

## 2019-01-01 PROCEDURE — 40000275 ZZH STATISTIC RCP TIME EA 10 MIN

## 2019-01-01 PROCEDURE — 85025 COMPLETE CBC W/AUTO DIFF WBC: CPT | Performed by: INTERNAL MEDICINE

## 2019-01-01 PROCEDURE — 97116 GAIT TRAINING THERAPY: CPT | Mod: GP | Performed by: PHYSICAL THERAPIST

## 2019-01-01 PROCEDURE — 40000061 ZZH STATISTIC EEG TIME EA 10 MIN

## 2019-01-01 PROCEDURE — 85610 PROTHROMBIN TIME: CPT | Performed by: PHYSICIAN ASSISTANT

## 2019-01-01 PROCEDURE — 82803 BLOOD GASES ANY COMBINATION: CPT

## 2019-01-01 PROCEDURE — 82550 ASSAY OF CK (CPK): CPT | Performed by: EMERGENCY MEDICINE

## 2019-01-01 PROCEDURE — 87640 STAPH A DNA AMP PROBE: CPT | Performed by: PHYSICIAN ASSISTANT

## 2019-01-01 PROCEDURE — 99232 SBSQ HOSP IP/OBS MODERATE 35: CPT | Performed by: PSYCHIATRY & NEUROLOGY

## 2019-01-01 PROCEDURE — 82550 ASSAY OF CK (CPK): CPT | Performed by: PHYSICIAN ASSISTANT

## 2019-01-01 PROCEDURE — 85730 THROMBOPLASTIN TIME PARTIAL: CPT | Performed by: INTERNAL MEDICINE

## 2019-01-01 PROCEDURE — 25000125 ZZHC RX 250: Performed by: PHYSICIAN ASSISTANT

## 2019-01-01 PROCEDURE — 72125 CT NECK SPINE W/O DYE: CPT

## 2019-01-01 PROCEDURE — 25800030 ZZH RX IP 258 OP 636: Performed by: PHYSICIAN ASSISTANT

## 2019-01-01 PROCEDURE — 99232 SBSQ HOSP IP/OBS MODERATE 35: CPT | Performed by: INTERNAL MEDICINE

## 2019-01-01 PROCEDURE — 71045 X-RAY EXAM CHEST 1 VIEW: CPT

## 2019-01-01 PROCEDURE — 97530 THERAPEUTIC ACTIVITIES: CPT | Mod: GO | Performed by: OCCUPATIONAL THERAPIST

## 2019-01-01 PROCEDURE — 92610 EVALUATE SWALLOWING FUNCTION: CPT | Mod: GN

## 2019-01-01 PROCEDURE — 97161 PT EVAL LOW COMPLEX 20 MIN: CPT | Mod: GP

## 2019-01-01 PROCEDURE — 97110 THERAPEUTIC EXERCISES: CPT | Mod: GP | Performed by: PHYSICAL THERAPIST

## 2019-01-01 PROCEDURE — 25800030 ZZH RX IP 258 OP 636: Performed by: EMERGENCY MEDICINE

## 2019-01-01 PROCEDURE — 96365 THER/PROPH/DIAG IV INF INIT: CPT

## 2019-01-01 PROCEDURE — 83036 HEMOGLOBIN GLYCOSYLATED A1C: CPT | Performed by: PHYSICIAN ASSISTANT

## 2019-01-01 PROCEDURE — 85610 PROTHROMBIN TIME: CPT | Performed by: EMERGENCY MEDICINE

## 2019-01-01 PROCEDURE — 99207 ZZC CDG-MDM COMPONENT: MEETS MODERATE - UP CODED: CPT | Performed by: INTERNAL MEDICINE

## 2019-01-01 PROCEDURE — 83605 ASSAY OF LACTIC ACID: CPT | Performed by: EMERGENCY MEDICINE

## 2019-01-01 PROCEDURE — 80069 RENAL FUNCTION PANEL: CPT | Performed by: INTERNAL MEDICINE

## 2019-01-01 PROCEDURE — 97116 GAIT TRAINING THERAPY: CPT | Mod: GP

## 2019-01-01 PROCEDURE — 99207 ZZC NON-BILLABLE SERV PER CHARTING: CPT | Performed by: PHYSICIAN ASSISTANT

## 2019-01-01 PROCEDURE — 96361 HYDRATE IV INFUSION ADD-ON: CPT

## 2019-01-01 PROCEDURE — 80053 COMPREHEN METABOLIC PANEL: CPT | Performed by: PHYSICIAN ASSISTANT

## 2019-01-01 PROCEDURE — 49440 PLACE GASTROSTOMY TUBE PERC: CPT

## 2019-01-01 PROCEDURE — 70450 CT HEAD/BRAIN W/O DYE: CPT | Mod: 77

## 2019-01-01 PROCEDURE — 84484 ASSAY OF TROPONIN QUANT: CPT

## 2019-01-01 PROCEDURE — 99291 CRITICAL CARE FIRST HOUR: CPT | Performed by: PSYCHIATRY & NEUROLOGY

## 2019-01-01 PROCEDURE — 99207 ZZC APP CREDIT; MD BILLING SHARED VISIT: CPT | Performed by: HOSPITALIST

## 2019-01-01 PROCEDURE — 83605 ASSAY OF LACTIC ACID: CPT | Performed by: PHYSICIAN ASSISTANT

## 2019-01-01 PROCEDURE — 92611 MOTION FLUOROSCOPY/SWALLOW: CPT | Mod: GN | Performed by: SPEECH-LANGUAGE PATHOLOGIST

## 2019-01-01 PROCEDURE — 76604 US EXAM CHEST: CPT

## 2019-01-01 PROCEDURE — 95951 ZZHC EEG VIDEO EACH 24 HR: CPT

## 2019-01-01 PROCEDURE — 25800029 ZZH RX IP 258 OP 250: Performed by: INTERNAL MEDICINE

## 2019-01-01 PROCEDURE — G0463 HOSPITAL OUTPT CLINIC VISIT: HCPCS

## 2019-01-01 PROCEDURE — 82272 OCCULT BLD FECES 1-3 TESTS: CPT | Performed by: EMERGENCY MEDICINE

## 2019-01-01 PROCEDURE — 99233 SBSQ HOSP IP/OBS HIGH 50: CPT | Mod: GC | Performed by: PSYCHIATRY & NEUROLOGY

## 2019-01-01 PROCEDURE — 84484 ASSAY OF TROPONIN QUANT: CPT | Performed by: EMERGENCY MEDICINE

## 2019-01-01 PROCEDURE — 25000125 ZZHC RX 250: Performed by: HOSPITALIST

## 2019-01-01 PROCEDURE — 92611 MOTION FLUOROSCOPY/SWALLOW: CPT | Mod: GN

## 2019-01-01 PROCEDURE — 83735 ASSAY OF MAGNESIUM: CPT | Performed by: EMERGENCY MEDICINE

## 2019-01-01 PROCEDURE — 96374 THER/PROPH/DIAG INJ IV PUSH: CPT

## 2019-01-01 PROCEDURE — 87641 MR-STAPH DNA AMP PROBE: CPT | Performed by: PHYSICIAN ASSISTANT

## 2019-01-01 PROCEDURE — 99292 CRITICAL CARE ADDL 30 MIN: CPT | Mod: GC | Performed by: PSYCHIATRY & NEUROLOGY

## 2019-01-01 PROCEDURE — 25800030 ZZH RX IP 258 OP 636: Performed by: STUDENT IN AN ORGANIZED HEALTH CARE EDUCATION/TRAINING PROGRAM

## 2019-01-01 PROCEDURE — 97166 OT EVAL MOD COMPLEX 45 MIN: CPT | Mod: GO | Performed by: OCCUPATIONAL THERAPIST

## 2019-01-01 PROCEDURE — 25000128 H RX IP 250 OP 636: Performed by: PHYSICIAN ASSISTANT

## 2019-01-01 RX ORDER — MORPHINE SULFATE 4 MG/ML
INJECTION, SOLUTION INTRAMUSCULAR; INTRAVENOUS
Status: COMPLETED
Start: 2019-01-01 | End: 2019-01-01

## 2019-01-01 RX ORDER — CHLORAL HYDRATE 500 MG
1 CAPSULE ORAL EVERY 24 HOURS
COMMUNITY
Start: 2010-11-17

## 2019-01-01 RX ORDER — PRAVASTATIN SODIUM 20 MG
20 TABLET ORAL DAILY
Status: DISCONTINUED | OUTPATIENT
Start: 2019-01-01 | End: 2019-01-01

## 2019-01-01 RX ORDER — ONDANSETRON 2 MG/ML
4 INJECTION INTRAMUSCULAR; INTRAVENOUS EVERY 6 HOURS PRN
Status: DISCONTINUED | OUTPATIENT
Start: 2019-01-01 | End: 2019-01-01

## 2019-01-01 RX ORDER — PROCHLORPERAZINE 25 MG
12.5 SUPPOSITORY, RECTAL RECTAL EVERY 12 HOURS PRN
Status: DISCONTINUED | OUTPATIENT
Start: 2019-01-01 | End: 2019-01-01

## 2019-01-01 RX ORDER — WARFARIN SODIUM 3 MG/1
3-4.5 TABLET ORAL DAILY
Status: ON HOLD | COMMUNITY
Start: 2015-04-09 | End: 2019-01-01

## 2019-01-01 RX ORDER — POLYETHYLENE GLYCOL 3350 17 G/17G
17 POWDER, FOR SOLUTION ORAL DAILY PRN
Status: DISCONTINUED | OUTPATIENT
Start: 2019-01-01 | End: 2019-01-01

## 2019-01-01 RX ORDER — NICOTINE POLACRILEX 4 MG
15-30 LOZENGE BUCCAL
Status: DISCONTINUED | OUTPATIENT
Start: 2019-01-01 | End: 2019-01-01

## 2019-01-01 RX ORDER — PROCHLORPERAZINE MALEATE 5 MG
5 TABLET ORAL EVERY 6 HOURS PRN
Status: DISCONTINUED | OUTPATIENT
Start: 2019-01-01 | End: 2019-01-01

## 2019-01-01 RX ORDER — NALOXONE HYDROCHLORIDE 0.4 MG/ML
.1-.4 INJECTION, SOLUTION INTRAMUSCULAR; INTRAVENOUS; SUBCUTANEOUS
Status: DISCONTINUED | OUTPATIENT
Start: 2019-01-01 | End: 2019-01-01 | Stop reason: HOSPADM

## 2019-01-01 RX ORDER — LEVOTHYROXINE SODIUM 100 UG/1
100 TABLET ORAL DAILY
Status: DISCONTINUED | OUTPATIENT
Start: 2019-01-01 | End: 2019-01-01

## 2019-01-01 RX ORDER — LIDOCAINE HYDROCHLORIDE 20 MG/ML
10 JELLY TOPICAL ONCE
Status: COMPLETED | OUTPATIENT
Start: 2019-01-01 | End: 2019-01-01

## 2019-01-01 RX ORDER — METOCLOPRAMIDE HYDROCHLORIDE 5 MG/ML
5 INJECTION INTRAMUSCULAR; INTRAVENOUS EVERY 6 HOURS PRN
Status: DISCONTINUED | OUTPATIENT
Start: 2019-01-01 | End: 2019-01-01

## 2019-01-01 RX ORDER — HYDRALAZINE HYDROCHLORIDE 20 MG/ML
10-20 INJECTION INTRAMUSCULAR; INTRAVENOUS
Status: DISCONTINUED | OUTPATIENT
Start: 2019-01-01 | End: 2019-01-01

## 2019-01-01 RX ORDER — LIDOCAINE 40 MG/G
CREAM TOPICAL
Status: DISCONTINUED | OUTPATIENT
Start: 2019-01-01 | End: 2019-01-01 | Stop reason: HOSPADM

## 2019-01-01 RX ORDER — MORPHINE SULFATE 2 MG/ML
1-2 INJECTION, SOLUTION INTRAMUSCULAR; INTRAVENOUS
Status: DISCONTINUED | OUTPATIENT
Start: 2019-01-01 | End: 2019-01-01

## 2019-01-01 RX ORDER — HYDROMORPHONE HYDROCHLORIDE 5 MG/5ML
.5-1 SOLUTION ORAL
Status: DISCONTINUED | OUTPATIENT
Start: 2019-01-01 | End: 2019-01-01 | Stop reason: HOSPADM

## 2019-01-01 RX ORDER — SODIUM CHLORIDE 9 MG/ML
INJECTION, SOLUTION INTRAVENOUS CONTINUOUS
Status: DISCONTINUED | OUTPATIENT
Start: 2019-01-01 | End: 2019-01-01

## 2019-01-01 RX ORDER — AMLODIPINE BESYLATE 2.5 MG/1
2.5 TABLET ORAL ONCE
Status: COMPLETED | OUTPATIENT
Start: 2019-01-01 | End: 2019-01-01

## 2019-01-01 RX ORDER — LIDOCAINE HYDROCHLORIDE 10 MG/ML
INJECTION, SOLUTION INFILTRATION; PERINEURAL
Status: COMPLETED
Start: 2019-01-01 | End: 2019-01-01

## 2019-01-01 RX ORDER — FLUMAZENIL 0.1 MG/ML
0.2 INJECTION, SOLUTION INTRAVENOUS
Status: DISCONTINUED | OUTPATIENT
Start: 2019-01-01 | End: 2019-01-01

## 2019-01-01 RX ORDER — BARIUM SULFATE 400 MG/ML
SUSPENSION ORAL ONCE
Status: COMPLETED | OUTPATIENT
Start: 2019-01-01 | End: 2019-01-01

## 2019-01-01 RX ORDER — ATROPINE SULFATE 10 MG/ML
1-2 SOLUTION/ DROPS OPHTHALMIC
Status: DISCONTINUED | OUTPATIENT
Start: 2019-01-01 | End: 2019-01-01 | Stop reason: HOSPADM

## 2019-01-01 RX ORDER — BENZTROPINE MESYLATE 0.5 MG/1
1-2 TABLET ORAL 3 TIMES DAILY PRN
Status: DISCONTINUED | OUTPATIENT
Start: 2019-01-01 | End: 2019-01-01

## 2019-01-01 RX ORDER — LABETALOL 20 MG/4 ML (5 MG/ML) INTRAVENOUS SYRINGE
10-20 EVERY 10 MIN PRN
Status: DISCONTINUED | OUTPATIENT
Start: 2019-01-01 | End: 2019-01-01

## 2019-01-01 RX ORDER — LORAZEPAM 0.5 MG/1
.5-1 TABLET ORAL
Status: DISCONTINUED | OUTPATIENT
Start: 2019-01-01 | End: 2019-01-01

## 2019-01-01 RX ORDER — AMOXICILLIN 250 MG
1 CAPSULE ORAL 2 TIMES DAILY PRN
Status: DISCONTINUED | OUTPATIENT
Start: 2019-01-01 | End: 2019-01-01

## 2019-01-01 RX ORDER — AMLODIPINE BESYLATE 5 MG/1
5 TABLET ORAL DAILY
Status: DISCONTINUED | OUTPATIENT
Start: 2019-01-01 | End: 2019-01-01 | Stop reason: HOSPADM

## 2019-01-01 RX ORDER — HYDROMORPHONE HYDROCHLORIDE 1 MG/ML
.3-.5 INJECTION, SOLUTION INTRAMUSCULAR; INTRAVENOUS; SUBCUTANEOUS
Status: DISCONTINUED | OUTPATIENT
Start: 2019-01-01 | End: 2019-01-01 | Stop reason: HOSPADM

## 2019-01-01 RX ORDER — NALOXONE HYDROCHLORIDE 0.4 MG/ML
.1-.4 INJECTION, SOLUTION INTRAMUSCULAR; INTRAVENOUS; SUBCUTANEOUS
Status: DISCONTINUED | OUTPATIENT
Start: 2019-01-01 | End: 2019-01-01

## 2019-01-01 RX ORDER — MANNITOL 20 G/100ML
75 INJECTION, SOLUTION INTRAVENOUS ONCE
Status: COMPLETED | OUTPATIENT
Start: 2019-01-01 | End: 2019-01-01

## 2019-01-01 RX ORDER — MINERAL OIL/HYDROPHIL PETROLAT
OINTMENT (GRAM) TOPICAL EVERY 8 HOURS PRN
Status: DISCONTINUED | OUTPATIENT
Start: 2019-01-01 | End: 2019-01-01 | Stop reason: HOSPADM

## 2019-01-01 RX ORDER — HYDROMORPHONE HYDROCHLORIDE 1 MG/ML
.3-.5 INJECTION, SOLUTION INTRAMUSCULAR; INTRAVENOUS; SUBCUTANEOUS
Status: DISCONTINUED | OUTPATIENT
Start: 2019-01-01 | End: 2019-01-01

## 2019-01-01 RX ORDER — ONDANSETRON 4 MG/1
4 TABLET, ORALLY DISINTEGRATING ORAL EVERY 6 HOURS PRN
Status: DISCONTINUED | OUTPATIENT
Start: 2019-01-01 | End: 2019-01-01 | Stop reason: HOSPADM

## 2019-01-01 RX ORDER — AMLODIPINE BESYLATE 5 MG/1
5 TABLET ORAL DAILY
DISCHARGE
Start: 2019-01-01

## 2019-01-01 RX ORDER — DEXTROSE MONOHYDRATE 50 MG/ML
INJECTION, SOLUTION INTRAVENOUS CONTINUOUS
Status: ACTIVE | OUTPATIENT
Start: 2019-01-01 | End: 2019-01-01

## 2019-01-01 RX ORDER — OXYCODONE HCL 5 MG/5 ML
5-10 SOLUTION, ORAL ORAL
Status: DISCONTINUED | OUTPATIENT
Start: 2019-01-01 | End: 2019-01-01

## 2019-01-01 RX ORDER — SODIUM CHLORIDE, SODIUM LACTATE, POTASSIUM CHLORIDE, CALCIUM CHLORIDE 600; 310; 30; 20 MG/100ML; MG/100ML; MG/100ML; MG/100ML
INJECTION, SOLUTION INTRAVENOUS CONTINUOUS
Status: DISCONTINUED | OUTPATIENT
Start: 2019-01-01 | End: 2019-01-01

## 2019-01-01 RX ORDER — PRAVASTATIN SODIUM 20 MG
20 TABLET ORAL DAILY
Status: ON HOLD | COMMUNITY
End: 2019-01-01

## 2019-01-01 RX ORDER — DEXTROSE MONOHYDRATE 50 MG/ML
INJECTION, SOLUTION INTRAVENOUS CONTINUOUS
Status: DISCONTINUED | OUTPATIENT
Start: 2019-01-01 | End: 2019-01-01

## 2019-01-01 RX ORDER — ACETAMINOPHEN 325 MG/1
650 TABLET ORAL EVERY 4 HOURS PRN
Status: DISCONTINUED | OUTPATIENT
Start: 2019-01-01 | End: 2019-01-01 | Stop reason: HOSPADM

## 2019-01-01 RX ORDER — ONDANSETRON 4 MG/1
4 TABLET, ORALLY DISINTEGRATING ORAL EVERY 6 HOURS PRN
Status: DISCONTINUED | OUTPATIENT
Start: 2019-01-01 | End: 2019-01-01

## 2019-01-01 RX ORDER — FENTANYL CITRATE 50 UG/ML
25-50 INJECTION, SOLUTION INTRAMUSCULAR; INTRAVENOUS EVERY 5 MIN PRN
Status: DISCONTINUED | OUTPATIENT
Start: 2019-01-01 | End: 2019-01-01

## 2019-01-01 RX ORDER — SODIUM CHLORIDE 9 MG/ML
1000 INJECTION, SOLUTION INTRAVENOUS CONTINUOUS
Status: DISCONTINUED | OUTPATIENT
Start: 2019-01-01 | End: 2019-01-01 | Stop reason: HOSPADM

## 2019-01-01 RX ORDER — METOCLOPRAMIDE 5 MG/1
5 TABLET ORAL EVERY 6 HOURS PRN
Status: DISCONTINUED | OUTPATIENT
Start: 2019-01-01 | End: 2019-01-01

## 2019-01-01 RX ORDER — SALIVA STIMULANT COMB. NO.3
1 SPRAY, NON-AEROSOL (ML) MUCOUS MEMBRANE
Status: DISCONTINUED | OUTPATIENT
Start: 2019-01-01 | End: 2019-01-01 | Stop reason: HOSPADM

## 2019-01-01 RX ORDER — MORPHINE SULFATE 4 MG/ML
4 INJECTION, SOLUTION INTRAMUSCULAR; INTRAVENOUS
Status: COMPLETED | OUTPATIENT
Start: 2019-01-01 | End: 2019-01-01

## 2019-01-01 RX ORDER — ACETAMINOPHEN 325 MG/1
650 TABLET ORAL EVERY 6 HOURS PRN
Status: DISCONTINUED | OUTPATIENT
Start: 2019-01-01 | End: 2019-01-01 | Stop reason: HOSPADM

## 2019-01-01 RX ORDER — DEXTROSE MONOHYDRATE 25 G/50ML
25-50 INJECTION, SOLUTION INTRAVENOUS
Status: DISCONTINUED | OUTPATIENT
Start: 2019-01-01 | End: 2019-01-01

## 2019-01-01 RX ORDER — ONDANSETRON 2 MG/ML
4 INJECTION INTRAMUSCULAR; INTRAVENOUS EVERY 6 HOURS PRN
Status: DISCONTINUED | OUTPATIENT
Start: 2019-01-01 | End: 2019-01-01 | Stop reason: HOSPADM

## 2019-01-01 RX ORDER — LORAZEPAM 0.5 MG/1
.5-1 TABLET ORAL
Status: DISCONTINUED | OUTPATIENT
Start: 2019-01-01 | End: 2019-01-01 | Stop reason: HOSPADM

## 2019-01-01 RX ORDER — LIDOCAINE 40 MG/G
CREAM TOPICAL
Status: DISCONTINUED | OUTPATIENT
Start: 2019-01-01 | End: 2019-01-01

## 2019-01-01 RX ORDER — ACETAMINOPHEN 325 MG/1
650 TABLET ORAL EVERY 4 HOURS PRN
DISCHARGE
Start: 2019-01-01

## 2019-01-01 RX ORDER — ACETAMINOPHEN 650 MG/1
650 SUPPOSITORY RECTAL EVERY 4 HOURS PRN
Status: DISCONTINUED | OUTPATIENT
Start: 2019-01-01 | End: 2019-01-01

## 2019-01-01 RX ORDER — LORAZEPAM 2 MG/ML
.5-1 INJECTION INTRAMUSCULAR
Status: DISCONTINUED | OUTPATIENT
Start: 2019-01-01 | End: 2019-01-01

## 2019-01-01 RX ORDER — LORAZEPAM 2 MG/ML
.5-1 INJECTION INTRAMUSCULAR
Status: DISCONTINUED | OUTPATIENT
Start: 2019-01-01 | End: 2019-01-01 | Stop reason: HOSPADM

## 2019-01-01 RX ORDER — FENTANYL CITRATE 50 UG/ML
INJECTION, SOLUTION INTRAMUSCULAR; INTRAVENOUS
Status: COMPLETED
Start: 2019-01-01 | End: 2019-01-01

## 2019-01-01 RX ORDER — SODIUM CHLORIDE 450 MG/100ML
INJECTION, SOLUTION INTRAVENOUS CONTINUOUS
Status: DISCONTINUED | OUTPATIENT
Start: 2019-01-01 | End: 2019-01-01

## 2019-01-01 RX ORDER — OXYCODONE HYDROCHLORIDE 5 MG/1
5-10 TABLET ORAL
Status: DISCONTINUED | OUTPATIENT
Start: 2019-01-01 | End: 2019-01-01

## 2019-01-01 RX ORDER — AMLODIPINE BESYLATE 2.5 MG/1
2.5 TABLET ORAL DAILY
Status: ON HOLD | COMMUNITY
Start: 2019-01-01 | End: 2019-01-01

## 2019-01-01 RX ORDER — LEVOTHYROXINE SODIUM 100 UG/1
100 TABLET ORAL DAILY
COMMUNITY
Start: 2015-01-15

## 2019-01-01 RX ORDER — LEVOTHYROXINE SODIUM 100 UG/1
100 TABLET ORAL DAILY
Status: DISCONTINUED | OUTPATIENT
Start: 2019-01-01 | End: 2019-01-01 | Stop reason: HOSPADM

## 2019-01-01 RX ORDER — AMOXICILLIN 250 MG
2 CAPSULE ORAL 2 TIMES DAILY PRN
Status: DISCONTINUED | OUTPATIENT
Start: 2019-01-01 | End: 2019-01-01

## 2019-01-01 RX ORDER — ACETAMINOPHEN 650 MG/1
650 SUPPOSITORY RECTAL EVERY 6 HOURS PRN
Status: DISCONTINUED | OUTPATIENT
Start: 2019-01-01 | End: 2019-01-01 | Stop reason: HOSPADM

## 2019-01-01 RX ORDER — BISACODYL 10 MG
10 SUPPOSITORY, RECTAL RECTAL
Status: DISCONTINUED | OUTPATIENT
Start: 2019-06-04 | End: 2019-01-01 | Stop reason: HOSPADM

## 2019-01-01 RX ORDER — AMLODIPINE BESYLATE 2.5 MG/1
2.5 TABLET ORAL DAILY
Status: DISCONTINUED | OUTPATIENT
Start: 2019-01-01 | End: 2019-01-01

## 2019-01-01 RX ADMIN — Medication 12.5 MG: at 11:07

## 2019-01-01 RX ADMIN — SODIUM CHLORIDE: 9 INJECTION, SOLUTION INTRAVENOUS at 18:46

## 2019-01-01 RX ADMIN — INSULIN ASPART 1 UNITS: 100 INJECTION, SOLUTION INTRAVENOUS; SUBCUTANEOUS at 13:13

## 2019-01-01 RX ADMIN — Medication 12.5 MG: at 12:26

## 2019-01-01 RX ADMIN — AMLODIPINE BESYLATE 5 MG: 5 TABLET ORAL at 15:04

## 2019-01-01 RX ADMIN — Medication 12.5 MG: at 22:05

## 2019-01-01 RX ADMIN — INSULIN GLARGINE 8 UNITS: 100 INJECTION, SOLUTION SUBCUTANEOUS at 11:16

## 2019-01-01 RX ADMIN — DEXTROSE MONOHYDRATE: 50 INJECTION, SOLUTION INTRAVENOUS at 20:06

## 2019-01-01 RX ADMIN — SODIUM CHLORIDE, POTASSIUM CHLORIDE, SODIUM LACTATE AND CALCIUM CHLORIDE: 600; 310; 30; 20 INJECTION, SOLUTION INTRAVENOUS at 04:22

## 2019-01-01 RX ADMIN — Medication 12.5 MG: at 09:41

## 2019-01-01 RX ADMIN — LEVOTHYROXINE SODIUM 100 MCG: 100 TABLET ORAL at 13:37

## 2019-01-01 RX ADMIN — MORPHINE SULFATE 4 MG: 4 INJECTION, SOLUTION INTRAMUSCULAR; INTRAVENOUS at 22:47

## 2019-01-01 RX ADMIN — Medication 12.5 MG: at 21:34

## 2019-01-01 RX ADMIN — INSULIN ASPART 5 UNITS: 100 INJECTION, SOLUTION INTRAVENOUS; SUBCUTANEOUS at 06:28

## 2019-01-01 RX ADMIN — BARIUM SULFATE 15 ML: 400 SUSPENSION ORAL at 09:13

## 2019-01-01 RX ADMIN — INSULIN ASPART 4 UNITS: 100 INJECTION, SOLUTION INTRAVENOUS; SUBCUTANEOUS at 02:18

## 2019-01-01 RX ADMIN — Medication 12.5 MG: at 21:51

## 2019-01-01 RX ADMIN — INSULIN ASPART 4 UNITS: 100 INJECTION, SOLUTION INTRAVENOUS; SUBCUTANEOUS at 03:46

## 2019-01-01 RX ADMIN — SODIUM CHLORIDE: 9 INJECTION, SOLUTION INTRAVENOUS at 22:45

## 2019-01-01 RX ADMIN — LEVOTHYROXINE SODIUM 100 MCG: 100 TABLET ORAL at 09:23

## 2019-01-01 RX ADMIN — ACETAMINOPHEN 650 MG: 160 SUSPENSION ORAL at 20:09

## 2019-01-01 RX ADMIN — INSULIN ASPART 4 UNITS: 100 INJECTION, SOLUTION INTRAVENOUS; SUBCUTANEOUS at 07:59

## 2019-01-01 RX ADMIN — INSULIN ASPART 1 UNITS: 100 INJECTION, SOLUTION INTRAVENOUS; SUBCUTANEOUS at 12:18

## 2019-01-01 RX ADMIN — Medication 12.5 MG: at 10:13

## 2019-01-01 RX ADMIN — DEXTROSE MONOHYDRATE: 50 INJECTION, SOLUTION INTRAVENOUS at 10:27

## 2019-01-01 RX ADMIN — DEXTROSE AND SODIUM CHLORIDE: 5; 450 INJECTION, SOLUTION INTRAVENOUS at 10:43

## 2019-01-01 RX ADMIN — INSULIN ASPART 2 UNITS: 100 INJECTION, SOLUTION INTRAVENOUS; SUBCUTANEOUS at 07:38

## 2019-01-01 RX ADMIN — INSULIN GLARGINE 6 UNITS: 100 INJECTION, SOLUTION SUBCUTANEOUS at 13:03

## 2019-01-01 RX ADMIN — INSULIN GLARGINE 16 UNITS: 100 INJECTION, SOLUTION SUBCUTANEOUS at 21:47

## 2019-01-01 RX ADMIN — INSULIN ASPART 1 UNITS: 100 INJECTION, SOLUTION INTRAVENOUS; SUBCUTANEOUS at 08:38

## 2019-01-01 RX ADMIN — BARIUM SULFATE 20 ML: 400 SUSPENSION ORAL at 10:06

## 2019-01-01 RX ADMIN — NICARDIPINE HYDROCHLORIDE 5 MG/HR: 0.2 INJECTION, SOLUTION INTRAVENOUS at 17:23

## 2019-01-01 RX ADMIN — AMLODIPINE BESYLATE 5 MG: 5 TABLET ORAL at 08:41

## 2019-01-01 RX ADMIN — SODIUM CHLORIDE 10 MG/HR: 900 INJECTION, SOLUTION INTRAVENOUS at 21:05

## 2019-01-01 RX ADMIN — Medication 12.5 MG: at 20:38

## 2019-01-01 RX ADMIN — BARIUM SULFATE 5 ML: 400 SUSPENSION ORAL at 09:13

## 2019-01-01 RX ADMIN — INSULIN GLARGINE 20 UNITS: 100 INJECTION, SOLUTION SUBCUTANEOUS at 10:10

## 2019-01-01 RX ADMIN — PROTHROMBIN, COAGULATION FACTOR VII HUMAN, COAGULATION FACTOR IX HUMAN, COAGULATION FACTOR X HUMAN, PROTEIN C, PROTEIN S HUMAN, AND WATER 2052 UNITS: KIT at 16:35

## 2019-01-01 RX ADMIN — AMLODIPINE BESYLATE 2.5 MG: 2.5 TABLET ORAL at 10:33

## 2019-01-01 RX ADMIN — Medication 12.5 MG: at 09:07

## 2019-01-01 RX ADMIN — SODIUM CHLORIDE 10 MG/HR: 900 INJECTION, SOLUTION INTRAVENOUS at 04:18

## 2019-01-01 RX ADMIN — INSULIN ASPART 2 UNITS: 100 INJECTION, SOLUTION INTRAVENOUS; SUBCUTANEOUS at 08:00

## 2019-01-01 RX ADMIN — PHYTONADIONE 10 MG: 10 INJECTION, EMULSION INTRAMUSCULAR; INTRAVENOUS; SUBCUTANEOUS at 16:48

## 2019-01-01 RX ADMIN — LEVOTHYROXINE SODIUM 100 MCG: 100 TABLET ORAL at 08:41

## 2019-01-01 RX ADMIN — SODIUM CHLORIDE: 4.5 INJECTION, SOLUTION INTRAVENOUS at 16:46

## 2019-01-01 RX ADMIN — INSULIN GLARGINE 30 UNITS: 100 INJECTION, SOLUTION SUBCUTANEOUS at 09:24

## 2019-01-01 RX ADMIN — LORAZEPAM 1 MG: 2 INJECTION INTRAMUSCULAR; INTRAVENOUS at 23:21

## 2019-01-01 RX ADMIN — LIDOCAINE HYDROCHLORIDE 5 ML: 20 JELLY TOPICAL at 10:25

## 2019-01-01 RX ADMIN — INSULIN ASPART 8 UNITS: 100 INJECTION, SOLUTION INTRAVENOUS; SUBCUTANEOUS at 15:12

## 2019-01-01 RX ADMIN — Medication 12.5 MG: at 10:10

## 2019-01-01 RX ADMIN — AMLODIPINE BESYLATE 5 MG: 5 TABLET ORAL at 13:37

## 2019-01-01 RX ADMIN — BARIUM SULFATE 5 ML: 400 SUSPENSION ORAL at 10:07

## 2019-01-01 RX ADMIN — INSULIN ASPART 5 UNITS: 100 INJECTION, SOLUTION INTRAVENOUS; SUBCUTANEOUS at 12:16

## 2019-01-01 RX ADMIN — INSULIN ASPART 4 UNITS: 100 INJECTION, SOLUTION INTRAVENOUS; SUBCUTANEOUS at 23:58

## 2019-01-01 RX ADMIN — Medication 12.5 MG: at 21:06

## 2019-01-01 RX ADMIN — LIDOCAINE HYDROCHLORIDE 10 ML: 10 INJECTION, SOLUTION INFILTRATION; PERINEURAL at 09:05

## 2019-01-01 RX ADMIN — AMLODIPINE BESYLATE 5 MG: 5 TABLET ORAL at 10:10

## 2019-01-01 RX ADMIN — FENTANYL CITRATE 25 MCG: 50 INJECTION, SOLUTION INTRAMUSCULAR; INTRAVENOUS at 08:59

## 2019-01-01 RX ADMIN — DEXTROSE AND SODIUM CHLORIDE: 5; 450 INJECTION, SOLUTION INTRAVENOUS at 06:28

## 2019-01-01 RX ADMIN — INSULIN GLARGINE 10 UNITS: 100 INJECTION, SOLUTION SUBCUTANEOUS at 17:02

## 2019-01-01 RX ADMIN — Medication 12.5 MG: at 13:37

## 2019-01-01 RX ADMIN — Medication 12.5 MG: at 23:14

## 2019-01-01 RX ADMIN — INSULIN ASPART 4 UNITS: 100 INJECTION, SOLUTION INTRAVENOUS; SUBCUTANEOUS at 04:30

## 2019-01-01 RX ADMIN — ACETAMINOPHEN 650 MG: 160 SUSPENSION ORAL at 09:39

## 2019-01-01 RX ADMIN — AMLODIPINE BESYLATE 5 MG: 5 TABLET ORAL at 09:23

## 2019-01-01 RX ADMIN — SODIUM CHLORIDE: 9 INJECTION, SOLUTION INTRAVENOUS at 06:53

## 2019-01-01 RX ADMIN — INSULIN ASPART 2 UNITS: 100 INJECTION, SOLUTION INTRAVENOUS; SUBCUTANEOUS at 02:53

## 2019-01-01 RX ADMIN — INSULIN ASPART 5 UNITS: 100 INJECTION, SOLUTION INTRAVENOUS; SUBCUTANEOUS at 10:11

## 2019-01-01 RX ADMIN — SODIUM CHLORIDE: 9 INJECTION, SOLUTION INTRAVENOUS at 05:58

## 2019-01-01 RX ADMIN — INSULIN GLARGINE 30 UNITS: 100 INJECTION, SOLUTION SUBCUTANEOUS at 08:00

## 2019-01-01 RX ADMIN — INSULIN ASPART 3 UNITS: 100 INJECTION, SOLUTION INTRAVENOUS; SUBCUTANEOUS at 12:28

## 2019-01-01 RX ADMIN — AMLODIPINE BESYLATE 2.5 MG: 2.5 TABLET ORAL at 16:54

## 2019-01-01 RX ADMIN — INSULIN ASPART 1 UNITS: 100 INJECTION, SOLUTION INTRAVENOUS; SUBCUTANEOUS at 06:48

## 2019-01-01 RX ADMIN — DEXTROSE MONOHYDRATE: 50 INJECTION, SOLUTION INTRAVENOUS at 11:10

## 2019-01-01 RX ADMIN — SODIUM CHLORIDE, POTASSIUM CHLORIDE, SODIUM LACTATE AND CALCIUM CHLORIDE: 600; 310; 30; 20 INJECTION, SOLUTION INTRAVENOUS at 18:20

## 2019-01-01 RX ADMIN — INSULIN ASPART 3 UNITS: 100 INJECTION, SOLUTION INTRAVENOUS; SUBCUTANEOUS at 21:55

## 2019-01-01 RX ADMIN — INSULIN ASPART 2 UNITS: 100 INJECTION, SOLUTION INTRAVENOUS; SUBCUTANEOUS at 16:56

## 2019-01-01 RX ADMIN — MANNITOL 75 G: 20 INJECTION, SOLUTION INTRAVENOUS at 16:36

## 2019-01-01 RX ADMIN — INSULIN GLARGINE 6 UNITS: 100 INJECTION, SOLUTION SUBCUTANEOUS at 13:09

## 2019-01-01 RX ADMIN — INSULIN ASPART 5 UNITS: 100 INJECTION, SOLUTION INTRAVENOUS; SUBCUTANEOUS at 20:09

## 2019-01-01 RX ADMIN — INSULIN GLARGINE 40 UNITS: 100 INJECTION, SOLUTION SUBCUTANEOUS at 08:38

## 2019-01-01 RX ADMIN — DEXTROSE MONOHYDRATE: 100 INJECTION, SOLUTION INTRAVENOUS at 00:40

## 2019-01-01 RX ADMIN — INSULIN ASPART 5 UNITS: 100 INJECTION, SOLUTION INTRAVENOUS; SUBCUTANEOUS at 14:30

## 2019-01-01 RX ADMIN — INSULIN ASPART 2 UNITS: 100 INJECTION, SOLUTION INTRAVENOUS; SUBCUTANEOUS at 16:01

## 2019-01-01 RX ADMIN — LIDOCAINE HYDROCHLORIDE 5 ML: 20 JELLY TOPICAL at 14:19

## 2019-01-01 RX ADMIN — INSULIN ASPART 1 UNITS: 100 INJECTION, SOLUTION INTRAVENOUS; SUBCUTANEOUS at 17:32

## 2019-01-01 RX ADMIN — Medication 12.5 MG: at 22:45

## 2019-01-01 RX ADMIN — SODIUM CHLORIDE 1000 ML: 9 INJECTION, SOLUTION INTRAVENOUS at 14:46

## 2019-01-01 RX ADMIN — INSULIN ASPART 4 UNITS: 100 INJECTION, SOLUTION INTRAVENOUS; SUBCUTANEOUS at 18:19

## 2019-01-01 RX ADMIN — DEXTROSE MONOHYDRATE: 50 INJECTION, SOLUTION INTRAVENOUS at 14:00

## 2019-01-01 RX ADMIN — ACETAMINOPHEN 650 MG: 160 SUSPENSION ORAL at 09:24

## 2019-01-01 RX ADMIN — SODIUM CHLORIDE: 9 INJECTION, SOLUTION INTRAVENOUS at 00:14

## 2019-01-01 RX ADMIN — MIDAZOLAM HYDROCHLORIDE 0.5 MG: 1 INJECTION, SOLUTION INTRAMUSCULAR; INTRAVENOUS at 08:59

## 2019-01-01 RX ADMIN — INSULIN ASPART 2 UNITS: 100 INJECTION, SOLUTION INTRAVENOUS; SUBCUTANEOUS at 01:14

## 2019-01-01 RX ADMIN — AMLODIPINE BESYLATE 5 MG: 5 TABLET ORAL at 08:56

## 2019-01-01 RX ADMIN — INSULIN ASPART 3 UNITS: 100 INJECTION, SOLUTION INTRAVENOUS; SUBCUTANEOUS at 22:46

## 2019-01-01 RX ADMIN — ACETAMINOPHEN 650 MG: 160 SUSPENSION ORAL at 09:23

## 2019-01-01 RX ADMIN — LEVOTHYROXINE SODIUM 100 MCG: 100 TABLET ORAL at 08:56

## 2019-01-01 RX ADMIN — INSULIN ASPART 4 UNITS: 100 INJECTION, SOLUTION INTRAVENOUS; SUBCUTANEOUS at 01:58

## 2019-01-01 RX ADMIN — INSULIN GLARGINE 30 UNITS: 100 INJECTION, SOLUTION SUBCUTANEOUS at 08:54

## 2019-01-01 RX ADMIN — MORPHINE SULFATE 2 MG: 2 INJECTION, SOLUTION INTRAMUSCULAR; INTRAVENOUS at 23:08

## 2019-01-01 RX ADMIN — INSULIN ASPART 4 UNITS: 100 INJECTION, SOLUTION INTRAVENOUS; SUBCUTANEOUS at 18:33

## 2019-01-01 RX ADMIN — INSULIN GLARGINE 8 UNITS: 100 INJECTION, SOLUTION SUBCUTANEOUS at 10:25

## 2019-01-01 RX ADMIN — METOPROLOL TARTRATE 12.5 MG: 25 TABLET, FILM COATED ORAL at 08:56

## 2019-01-01 RX ADMIN — INSULIN GLARGINE 30 UNITS: 100 INJECTION, SOLUTION SUBCUTANEOUS at 10:38

## 2019-01-01 RX ADMIN — ACETAMINOPHEN 650 MG: 160 SUSPENSION ORAL at 00:23

## 2019-01-01 RX ADMIN — INSULIN ASPART 4 UNITS: 100 INJECTION, SOLUTION INTRAVENOUS; SUBCUTANEOUS at 16:50

## 2019-01-01 RX ADMIN — ACETAMINOPHEN 650 MG: 160 SUSPENSION ORAL at 08:58

## 2019-01-01 RX ADMIN — AMLODIPINE BESYLATE 5 MG: 5 TABLET ORAL at 08:00

## 2019-01-01 RX ADMIN — MORPHINE SULFATE 4 MG: 4 INJECTION INTRAVENOUS at 22:47

## 2019-01-01 RX ADMIN — INSULIN ASPART 1 UNITS: 100 INJECTION, SOLUTION INTRAVENOUS; SUBCUTANEOUS at 12:46

## 2019-01-01 RX ADMIN — DEXTROSE MONOHYDRATE: 50 INJECTION, SOLUTION INTRAVENOUS at 22:39

## 2019-01-01 RX ADMIN — INSULIN ASPART 3 UNITS: 100 INJECTION, SOLUTION INTRAVENOUS; SUBCUTANEOUS at 22:42

## 2019-01-01 RX ADMIN — SODIUM CHLORIDE 10 MG/HR: 900 INJECTION, SOLUTION INTRAVENOUS at 00:15

## 2019-01-01 RX ADMIN — INSULIN ASPART 2 UNITS: 100 INJECTION, SOLUTION INTRAVENOUS; SUBCUTANEOUS at 20:42

## 2019-01-01 RX ADMIN — Medication 12.5 MG: at 10:47

## 2019-01-01 RX ADMIN — ACETAMINOPHEN 650 MG: 160 SUSPENSION ORAL at 14:27

## 2019-01-01 RX ADMIN — LEVOTHYROXINE SODIUM 100 MCG: 100 TABLET ORAL at 09:39

## 2019-01-01 RX ADMIN — INSULIN ASPART 3 UNITS: 100 INJECTION, SOLUTION INTRAVENOUS; SUBCUTANEOUS at 20:58

## 2019-01-01 RX ADMIN — AMLODIPINE BESYLATE 5 MG: 5 TABLET ORAL at 09:39

## 2019-01-01 RX ADMIN — FENTANYL CITRATE 25 MCG: 50 INJECTION, SOLUTION INTRAMUSCULAR; INTRAVENOUS at 08:33

## 2019-01-01 RX ADMIN — DEXTROSE MONOHYDRATE: 50 INJECTION, SOLUTION INTRAVENOUS at 06:10

## 2019-01-01 RX ADMIN — INSULIN ASPART 1 UNITS: 100 INJECTION, SOLUTION INTRAVENOUS; SUBCUTANEOUS at 13:38

## 2019-01-01 RX ADMIN — SODIUM CHLORIDE: 9 INJECTION, SOLUTION INTRAVENOUS at 16:20

## 2019-01-01 RX ADMIN — INSULIN GLARGINE 10 UNITS: 100 INJECTION, SOLUTION SUBCUTANEOUS at 22:49

## 2019-01-01 RX ADMIN — INSULIN ASPART 5 UNITS: 100 INJECTION, SOLUTION INTRAVENOUS; SUBCUTANEOUS at 17:18

## 2019-01-01 RX ADMIN — MIDAZOLAM HYDROCHLORIDE 0.5 MG: 1 INJECTION, SOLUTION INTRAMUSCULAR; INTRAVENOUS at 08:32

## 2019-01-01 RX ADMIN — SODIUM CHLORIDE 10 MG/HR: 900 INJECTION, SOLUTION INTRAVENOUS at 09:32

## 2019-01-01 RX ADMIN — INSULIN GLARGINE 30 UNITS: 100 INJECTION, SOLUTION SUBCUTANEOUS at 09:39

## 2019-01-01 RX ADMIN — LEVOTHYROXINE SODIUM 100 MCG: 100 TABLET ORAL at 08:00

## 2019-01-01 RX ADMIN — HYDROMORPHONE HYDROCHLORIDE 0.5 MG: 1 INJECTION, SOLUTION INTRAMUSCULAR; INTRAVENOUS; SUBCUTANEOUS at 00:22

## 2019-01-01 RX ADMIN — INSULIN ASPART 5 UNITS: 100 INJECTION, SOLUTION INTRAVENOUS; SUBCUTANEOUS at 06:19

## 2019-01-01 RX ADMIN — INSULIN ASPART 3 UNITS: 100 INJECTION, SOLUTION INTRAVENOUS; SUBCUTANEOUS at 08:08

## 2019-01-01 RX ADMIN — INSULIN ASPART 3 UNITS: 100 INJECTION, SOLUTION INTRAVENOUS; SUBCUTANEOUS at 14:14

## 2019-01-01 RX ADMIN — SODIUM CHLORIDE 1000 ML: 9 INJECTION, SOLUTION INTRAVENOUS at 19:55

## 2019-01-01 RX ADMIN — AMLODIPINE BESYLATE 5 MG: 5 TABLET ORAL at 09:24

## 2019-01-01 RX ADMIN — INSULIN GLARGINE 14 UNITS: 100 INJECTION, SOLUTION SUBCUTANEOUS at 09:12

## 2019-01-01 RX ADMIN — INSULIN ASPART 3 UNITS: 100 INJECTION, SOLUTION INTRAVENOUS; SUBCUTANEOUS at 18:15

## 2019-01-01 RX ADMIN — Medication 12.5 MG: at 20:30

## 2019-01-01 RX ADMIN — INSULIN GLARGINE 10 UNITS: 100 INJECTION, SOLUTION SUBCUTANEOUS at 14:30

## 2019-01-01 RX ADMIN — INSULIN ASPART 6 UNITS: 100 INJECTION, SOLUTION INTRAVENOUS; SUBCUTANEOUS at 00:15

## 2019-01-01 RX ADMIN — INSULIN GLARGINE 30 UNITS: 100 INJECTION, SOLUTION SUBCUTANEOUS at 13:37

## 2019-01-01 RX ADMIN — DEXTROSE MONOHYDRATE 25 ML: 500 INJECTION PARENTERAL at 20:33

## 2019-01-01 RX ADMIN — INSULIN ASPART 3 UNITS: 100 INJECTION, SOLUTION INTRAVENOUS; SUBCUTANEOUS at 04:53

## 2019-01-01 RX ADMIN — ACETAMINOPHEN 650 MG: 160 SUSPENSION ORAL at 08:56

## 2019-01-01 RX ADMIN — INSULIN ASPART 7 UNITS: 100 INJECTION, SOLUTION INTRAVENOUS; SUBCUTANEOUS at 09:24

## 2019-01-01 RX ADMIN — SODIUM CHLORIDE: 4.5 INJECTION, SOLUTION INTRAVENOUS at 15:53

## 2019-01-01 RX ADMIN — INSULIN ASPART 3 UNITS: 100 INJECTION, SOLUTION INTRAVENOUS; SUBCUTANEOUS at 10:32

## 2019-01-01 RX ADMIN — LEVOTHYROXINE SODIUM 100 MCG: 100 TABLET ORAL at 10:10

## 2019-01-01 RX ADMIN — INSULIN ASPART 4 UNITS: 100 INJECTION, SOLUTION INTRAVENOUS; SUBCUTANEOUS at 21:45

## 2019-01-01 RX ADMIN — INSULIN ASPART 6 UNITS: 100 INJECTION, SOLUTION INTRAVENOUS; SUBCUTANEOUS at 05:00

## 2019-01-01 RX ADMIN — LEVOTHYROXINE SODIUM 100 MCG: 100 TABLET ORAL at 15:04

## 2019-01-01 RX ADMIN — INSULIN ASPART 2 UNITS: 100 INJECTION, SOLUTION INTRAVENOUS; SUBCUTANEOUS at 20:38

## 2019-01-01 RX ADMIN — INSULIN ASPART 4 UNITS: 100 INJECTION, SOLUTION INTRAVENOUS; SUBCUTANEOUS at 00:15

## 2019-01-01 ASSESSMENT — ACTIVITIES OF DAILY LIVING (ADL)
ADLS_ACUITY_SCORE: 19
ADLS_ACUITY_SCORE: 22
ADLS_ACUITY_SCORE: 22
ADLS_ACUITY_SCORE: 19
ADLS_ACUITY_SCORE: 22
ADLS_ACUITY_SCORE: 23
ADLS_ACUITY_SCORE: 22
ADLS_ACUITY_SCORE: 23
ADLS_ACUITY_SCORE: 25
ADLS_ACUITY_SCORE: 22
ADLS_ACUITY_SCORE: 22
ADLS_ACUITY_SCORE: 23
ADLS_ACUITY_SCORE: 23
ADLS_ACUITY_SCORE: 22
ADLS_ACUITY_SCORE: 23
ADLS_ACUITY_SCORE: 22
ADLS_ACUITY_SCORE: 22
PREVIOUS_RESPONSIBILITIES: MEAL PREP;HOUSEKEEPING;LAUNDRY;MEDICATION MANAGEMENT;DRIVING
ADLS_ACUITY_SCORE: 23
ADLS_ACUITY_SCORE: 23
ADLS_ACUITY_SCORE: 20
ADLS_ACUITY_SCORE: 20
ADLS_ACUITY_SCORE: 23
ADLS_ACUITY_SCORE: 22
ADLS_ACUITY_SCORE: 23
ADLS_ACUITY_SCORE: 14
ADLS_ACUITY_SCORE: 22
ADLS_ACUITY_SCORE: 21
ADLS_ACUITY_SCORE: 22
ADLS_ACUITY_SCORE: 23
ADLS_ACUITY_SCORE: 23
ADLS_ACUITY_SCORE: 19
ADLS_ACUITY_SCORE: 21
ADLS_ACUITY_SCORE: 23
ADLS_ACUITY_SCORE: 19
ADLS_ACUITY_SCORE: 25
ADLS_ACUITY_SCORE: 22
ADLS_ACUITY_SCORE: 19
ADLS_ACUITY_SCORE: 23
ADLS_ACUITY_SCORE: 21
ADLS_ACUITY_SCORE: 23
ADLS_ACUITY_SCORE: 22
ADLS_ACUITY_SCORE: 23
ADLS_ACUITY_SCORE: 21
ADLS_ACUITY_SCORE: 23
ADLS_ACUITY_SCORE: 22
ADLS_ACUITY_SCORE: 22
ADLS_ACUITY_SCORE: 21
ADLS_ACUITY_SCORE: 25
ADLS_ACUITY_SCORE: 25
ADLS_ACUITY_SCORE: 21
ADLS_ACUITY_SCORE: 23
ADLS_ACUITY_SCORE: 22
ADLS_ACUITY_SCORE: 23
ADLS_ACUITY_SCORE: 22
ADLS_ACUITY_SCORE: 23
ADLS_ACUITY_SCORE: 22
ADLS_ACUITY_SCORE: 23
ADLS_ACUITY_SCORE: 22
ADLS_ACUITY_SCORE: 23
ADLS_ACUITY_SCORE: 21
ADLS_ACUITY_SCORE: 23
ADLS_ACUITY_SCORE: 22
ADLS_ACUITY_SCORE: 15
ADLS_ACUITY_SCORE: 23
ADLS_ACUITY_SCORE: 23
ADLS_ACUITY_SCORE: 14
ADLS_ACUITY_SCORE: 21
ADLS_ACUITY_SCORE: 25
ADLS_ACUITY_SCORE: 25
ADLS_ACUITY_SCORE: 22
ADLS_ACUITY_SCORE: 21
ADLS_ACUITY_SCORE: 23
ADLS_ACUITY_SCORE: 22
ADLS_ACUITY_SCORE: 23
ADLS_ACUITY_SCORE: 19
ADLS_ACUITY_SCORE: 21
ADLS_ACUITY_SCORE: 14
ADLS_ACUITY_SCORE: 22
ADLS_ACUITY_SCORE: 23
ADLS_ACUITY_SCORE: 22
ADLS_ACUITY_SCORE: 25
ADLS_ACUITY_SCORE: 15
ADLS_ACUITY_SCORE: 20
ADLS_ACUITY_SCORE: 22
ADLS_ACUITY_SCORE: 15
ADLS_ACUITY_SCORE: 23

## 2019-01-01 ASSESSMENT — MIFFLIN-ST. JEOR
SCORE: 1465.04
SCORE: 1449
SCORE: 1449
SCORE: 1455.97
SCORE: 1433
SCORE: 1469.58
SCORE: 1456
SCORE: 1455.97
SCORE: 1460.51

## 2019-01-01 ASSESSMENT — ENCOUNTER SYMPTOMS
ARTHRALGIAS: 1
CONFUSION: 1

## 2019-05-17 PROBLEM — S06.5XAA SUBDURAL HEMATOMA (H): Status: ACTIVE | Noted: 2019-01-01

## 2019-05-17 NOTE — ED NOTES
Bed: ED32  Expected date: 5/17/19  Expected time:   Means of arrival: Ambulance  Comments:  [address]  89M  Found on floor after fall

## 2019-05-17 NOTE — ED PROVIDER NOTES
History     Chief Complaint:  Fall     HPI   Jayson Leija is a 89 year old male with a history of CAD, atrial fibrillation, CKD, diabetes, and arthritis who presents with his daughter via EMS to the Emergency Department today for evaluation of fall. EMS report that patient was last seen four days ago by his daughter. He was found today at his home where he lives alone, incontinent of feces and urine. EMS reported patient was confused on the scene. Patient is unsure when he fell. He thought it was this afternoon but reports people have told him it has been 2 or 4 days. Patient reports bilateral knee pain for the last year, more prominent in the left. He denies groin pain. Daughter repots it looked as if patient had an episode of diarrhea while on the floor. She says he is able to tell her what happens during the week with some struggles at baseline. She is concerned he may be mixing up his medications. His daughter reports that he gets Meals on Wheels Wednesdays and Fridays and his meal from two days ago and today were sitting in his doorway. He does not normally use a cane or a walker.  The patient has no medical directives that his daughter, his health care power of  is aware of.    Allergies:  No known drug allergies    Medications:    Norvasc  Calcium 500 + D  Synthroid  Coumadin  Pravachol   Lisinopril    Past Medical History:    Atrial fibrillation  CKD  Diabetes  Hypertension  Hyperthyroidism   Hyperparathyroidism  Hypothyroid  Back pain  CAD  Hypercalcemia  Complete heart block  Macular degeneration  Choledocholithiasis  Arthritis    Past Surgical History:    Thyroidectomy  Pacemaker placement  Gallbladder surgery  Cataract surgery    Family History:    Liver disease  Breast cancer   Cardiovascular disease  Hypertension  Cancer  Ophthalmic disease    Social History:  Smoking status: Former smoker  Alcohol use: Yes     Review of Systems   Musculoskeletal: Positive for arthralgias.    Psychiatric/Behavioral: Positive for confusion.   All other systems reviewed and are negative.      Physical Exam     Patient Vitals for the past 24 hrs:   BP Temp Temp src Pulse Heart Rate Resp SpO2 Weight   05/17/19 1725 196/84 -- -- 72 69 23 92 % --   05/17/19 1715 194/84 -- -- 70 70 18 97 % --   05/17/19 1700 193/74 -- -- 70 70 19 96 % --   05/17/19 1609 -- -- -- -- -- -- -- 74.8 kg (165 lb)   05/17/19 1545 192/90 -- -- 70 -- -- -- --   05/17/19 1544 -- -- -- -- -- -- 94 % --   05/17/19 1543 -- -- -- -- -- -- 92 % --   05/17/19 1537 (!) 186/93 -- -- 71 -- -- -- --   05/17/19 1518 -- -- -- -- -- 13 -- --   05/17/19 1517 -- -- -- -- 70 13 -- --   05/17/19 1515 169/78 -- -- 70 70 24 -- --   05/17/19 1446 173/88 -- -- 70 74 20 -- --   05/17/19 1357 164/79 97.7  F (36.5  C) Axillary -- 73 20 98 % --       Physical Exam    General: Resting on gurney, alert  HEENT:   The scalp and head appear normal, without signs of trauma.    The pupils are equal, round, and reactive to light    Extraocular muscles are intact.    The nose is normal.    The oropharynx is normal.      Uvula is in the midline.    Neck:  Normal range of motion. Denies tenderness nor pain.   Lungs:  Clear.      No rales, no wheezing.      There is no tachypnea.  Non-labored.  Cardiac: Regular rate.      Normal S1 and S2.      No pathological murmur/rub    Abdomen: Soft. No distension, no localized tenderness or rebound.   Rectal:  Over mid-sacrum erythematous area looks like a scar. Is not an ulcer    Enlarged prostate; no melena, no blood.  MS:  Hip mobility right without pain PROM bilaterally.    No joint effusions either knee    Left knee more uncomfortable than right on passive range of   movement testing.  No spinous process tenderness over the entire spine.  Neurologic:     Normal mentation.  No cranial nerve deficits. Weakness noted on left upper extremity relative to the right when reaching over to the railing to pull self to his side for  examination of his back.    Speech normal.  Psych:  Awake.     Alert.      Normal affect.      Appropriate interactions.  Skin:  Skin tear mid-thoracic region.  Skin darkened and erythematous over the mid sacrum in the midline but does not appear to be an ulcer.    Emergency Department Course     ECG (13:57:05):  Rate 72 bpm. IL interval *. QRS duration 166. QT/QTc 464/508. P-R-T axes * -55 101. Ventricular-paced rhythm. Abnormal ECG. Interpreted at 1400 by Asim Naranjo MD.    Imaging:  Radiographic findings were communicated with the patient and family who voiced understanding of the findings.  CT Head w/o Contrast  IMPRESSION:     1. Large heterogeneous right-sided subdural hematoma with hematocrit  layer and internal loculations. Findings are favored to represent an  acute on chronic subdural hemorrhage. The subdural hemorrhage measures  2.7 cm in maximum width on the coronal images. There is 0.7 cm of  leftward midline shift. There is mass effect on the lateral ventricle  without evidence of hydrocephalus.  2. Diffuse parenchymal volume loss and white matter changes likely due  to chronic microvascular ischemic disease. As read by radiology.  CT Cervical Spine w/o Contrast  IMPRESSION:   1. No evidence of acute fracture or subluxation in the cervical spine.  2. Degenerative changes in the cervical spine as described above. As read by radiology.    Laboratory:  CBC: WBC 18.9 (H) o/w WNL (HGB 15.4, )  CMP: Glucose 389 (H), Urea nitrogen 69 (H), Creatinine 2.10 (H), GFR 27 (L), Bilirubin total 3.4 (H), Albumin 3.2 (L), AST 93 (H) o/w WNL     1442: Troponin POCT: 0.15 (H)    1403: Lactic acid whole blood: 4.1 (H)    TSH: 2.42    CK total: 2,190 (H)    Magnesium: 2.4 (H)    INR: 2.48 (H)    Occult blood stool: Positive    UA: Glucose >1000, Ketones trace, Blood trace, Albumin 200, RBC 3, Mucous present, o/w negative    Interventions:  1446: NS 1L IV Bolus  1635: Kcentra 2,052 units IV  1636: Mannitol 75 G  IV  1648: Phytonadione 70 MG IV   1723: Nicardipine 40 MG IV drip    Emergency Department Course:  Past medical records, nursing notes, and vitals reviewed.  1421: I performed an exam of the patient and obtained history, as documented above.    IV inserted and blood drawn.    The patient was sent for a head CT, cervical spine CT, chest x-ray, and left knee x-ray while in the emergency department, findings above.    1343: I spoke with Dr. Scott of Radiology regarding this patient.    1603: I spoke with Dr. Elizabeth of Neurosurgery regarding this patient.    1612: I rechecked the patient. Explained findings to the patient and his daughter.    1618: I spoke with Dr. Cat regarding this patient.    Findings and plan explained to the Patient and daughter.    1732: Patient will be transferred to [facility] via EMS. Discussed the case with Dr. Cat, who will admit the patient to a monitored bed for further monitoring, evaluation, and treatment.      Impression & Plan      CMS Diagnoses: The Lactic acid level is elevated due to acute renal failure and dehydration, at this time there is no sign of severe sepsis or septic shock.    Medical Decision Making:  Jayson Leija is a 89 year old male found down perhaps anywhere from a few hours to a few days. Initial work up reveals that he has acute renal failure and early rhabdomyolysis. His lactate was elevated at 4.1 and he was given a liter of fluid to begin with. Then he returned from CT of the head which showed a large subdural hematoma on the right 2.7 cms that was heterogenous in nature showing acute on chronic picture. The patient is therapeutically anticoagulated on Coumadin at 2.45 and effort were directed to reverse his Coumadin with Kaycentra and IV Vitamin K as discussed with Dr. Elizabeth of neurosurgery. His blood pressure began to increase and he was started on a nicardipine drip in conjunction with our pharmacist who was assisting with both the reversal and blood  pressure control, as well as the decrease in ICP using 1 G/kg of mannitol. The patient remained alert. The head of the bed was kept at 30 degrees and oxygen was placed. He was continuing to be interactive at the time of transfer.      I had a long discussion with the patient and his daughter about intubation in an effort to decrease ICP, but also to protect his airway if he were to become obtunded.  I spent 5-10 minutes discussing this with them and no clear decision was reached. It is clear that the patient is cognitively intact at baseline and is fully oriented to person, time, and place and is relatively independent living alone in his own home still at 89 years of age. At the time of transfer, the patient does have his eyes closed but readily opens them to voice and is very alert and appropriately conversive.  I felt that at this point since the patient and his daughter were unsure if he wanted to be placed on a vent that this could be discussed with the hospitalist further at [facility].        Critical Care time:  was 60 minutes for this patient excluding procedures.    Diagnosis:    ICD-10-CM   1. Subdural hematoma (H) S06.5X9A   2.       Acute on chronic renal failure  3.       Rhabdomyolysis  4.       Anticoagulation with Coumadin              Disposition:  Transferred     Lakesha Wallace  5/17/2019   [facility] EMERGENCY DEPARTMENT  Scribe Disclosure:  I, Lakesha Wallace, am serving as a scribe at 2:21 PM on 5/17/2019 to document services personally performed by Asim Naranjo MD based on my observations and the provider's statements to me.        Asim Naranjo MD  05/18/19 0849       Asim Naranjo MD  05/18/19 0857

## 2019-05-18 NOTE — PLAN OF CARE
Discharge Planner SLP   Patient plan for discharge: not confused d/t awareness, decreased orientation  Current status: Clinical swallow evaluation completed. Pt lethargic and per RN has had a decline in neuro checks since this AM. He is able to maintain alertness with cues. Oriented to self, but not to location or current medical status. Cranial nerve evaluation revealed mild left facial droop, mild left sided overall weakness and possible deviated uvula; mild dysarthria also noted. Pt able to follow commands, cough and elicit a dry swallow. Assessment completed with oral cares, ice chips, thin liquids via tsp, nectar thick liquids via tsp, honey thick liquids via tsp and puree solids via tsp. He currently presents with moderate oropharyngeal dysphagia. Oral transit and manipulation is prolonged with possible tongue pumping, specifically with thicker consistencies. Suspect significant premature spillage of bolus into pharynx and swallow trigger judged moderately delayed. Hyolaryngeal elevation consistently was achieved to palpation and visualization. Pt does report it feels  harder  to swallow, but denies any sensation of aspiration or globus. Overt wet vocal quality noted with 100% of thin liquid and nectar thick liquid trials and with ~50% of honey and puree. Given neurological decline, lethargy and current signs/sx aspiration with all PO assessed, recommend continue NPO status. Discussed with RN and MD who are in agreement - they plan on furthering discussion re: comfort cares with pt and family later this date. If pt/family decide to continue with restorative care or want further information would recommend a video swallow study in upcoming sessions. Will closely monitor.   Barriers to return to prior living situation: nutrition, dysphagia, neurological status, assist  Recommendations for discharge: consider ARU depending goals of care  Rationale for recommendations: pt significantly below baseline for swallow  function - NPO, below baseline for speech/language, good family support. Currently lethargic, however will monitor ability to participate in 3 hours/therapy a day.        Entered by: Carmela Alcantara 05/18/2019 11:47 AM

## 2019-05-18 NOTE — PROGRESS NOTES
"Clinical Swallow Evaluation:      05/18/19 1141   General Information   Onset Date 05/17/19   Start of Care Date 05/18/19   Referring Physician Johanne Alvarez PA-C   Patient/Family Goals Statement he is agreeable to PO trials   Swallowing Evaluation Bedside swallow evaluation   Behaviorial Observations Confused;Lethargic   Mode of current nutrition NPO   Respiratory Status O2 Supply  (2L oxymask, RN okay'd taking off during evaluation')   Comments Per neurology \"Jayson Leija is a 89 year old male with Afib (on Coumadin) CKD, DMII, HTN, who was found down by his daughter with left-sided weakness.  Based upon his uncollected mail, the family suspects that he fell approximately 2 days ago.   He presented to the [facility] where he was found to have an acute on chronic right subdural causing compression, mass effect, and subfalcine herniation.  He received KCentra (INR 2.5) and mannitold and was transferred to [facility].\" Pt failed dysphagia screen d/t slurred speech, NPO until SLP evaluation. No prior SLP services per [facility]. RN reports family stating he has been progressively declining over the past few months at home.    Clinical Swallow Evaluation   Oral Musculature generally intact   Structural Abnormalities none present   Dentition   (sparse back )   Secretion Management problems swallowing secretions  (wet vocal quality)   Mandibular Strength and Mobility intact   Oral Labial Strength and Mobility   (mild left labial droop)   Lingual Strength and Mobility impaired left lateral movement   Velar Elevation   (possible deviated?)   Buccal Strength and Mobility intact   Laryngeal Function Cough;Throat clear;Swallow;Voicing initiated;Dry swallow palpated   Additional Documentation Yes   Clinical Swallow Eval: Thin Liquid Texture Trial   Mode of Presentation, Thin Liquids spoon;fed by clinician   Volume of Liquid or Food Presented ice chips x2, tsp of water x4   Oral Phase of Swallow Premature pharyngeal " entry;Poor AP movement   Pharyngeal Phase of Swallow reduction in laryngeal movement;wet vocal quality after swallow   Diagnostic Statement wet vocal quality on 100%   Clinical Swallow Eval: Nectar Thick Liquid Texture Trial   Mode of Presentation, Nectar spoon;fed by clinician   Volume of Nectar Presented tsps x4   Oral Phase, Nectar Premature pharyngeal entry;Poor AP movement   Pharyngeal Phase, Nectar reduction in laryngeal movement;wet vocal quality after swallow   Diagnostic Statement wet vocal quality on 100%   Clinical Swallow Eval: Honey Thick Liquid Texture Trial   Mode of Presentation, Honey spoon;fed by clinician   Volume of Honey Presented tsp x15   Oral Phase, Honey Premature pharyngeal entry;Poor AP movement   Pharyngeal Phase, Honey reduction in laryngeal movement;wet vocal quality after swallow   Diagnostic Statement wet vocal quality ~ 50%, often delayed   Clinical Swallow Eval: Puree Solid Texture Trial   Mode of Presentation, Puree spoon;fed by clinician   Oral Phase, Puree Poor AP movement;Premature pharyngeal entry   Pharyngeal Phase, Puree reduction in laryngeal movement;wet vocal quality after swallow   Diagnostic Statement wet vocal quality ~ 50%, often delayed   General Therapy Interventions   Planned Therapy Interventions Dysphagia Treatment   Swallow Eval: Clinical Impressions   Skilled Criteria for Therapy Intervention Skilled criteria met.  Treatment indicated.   Functional Assessment Scale (FAS) 3   Treatment Diagnosis moderate oropharyngeal dysphagia   Diet texture recommendations NPO   Therapy Frequency daily   Predicted Duration of Therapy Intervention (days/wks) 1 week   Anticipated Discharge Disposition   (ARU)   Risks and Benefits of Treatment have been explained. Yes   Patient, family and/or staff in agreement with Plan of Care Yes   Clinical Impression Comments Clinical swallow evaluation completed. Pt lethargic and per RN has had a decline in neuro checks since this AM. He is  "able to maintain alertness with cues. Oriented to self, but not to location or current medical status. Cranial nerve evaluation revealed mild left facial droop, mild left sided overall weakness and possible deviated uvula; mild dysarthria also noted. Pt able to follow commands, cough and elicit a dry swallow. Assessment completed with oral cares, ice chips, thin liquids via tsp, nectar thick liquids via tsp, honey thick liquids via tsp and puree solids via tsp. He currently presents with moderate oropharyngeal dysphagia. Oral transit and manipulation is prolonged with possible tongue pumping, specifically with thicker consistencies. Suspect significant premature spillage of bolus into pharynx and swallow trigger judged moderately delayed. Hyolaryngeal elevation consistently was achieved to palpation and visualization. Pt does report it feels \"harder\" to swallow, but denies any sensation of aspiration or globus. Overt wet vocal quality noted with 100% of thin liquid and nectar thick liquid trials and with ~50% of honey and puree. Given neurological decline, lethargy and current signs/sx with all PO assessed, recommend continue NPO status. Discussed with RN and MD who are in agreement - they plan on furthering discussion re: comfort cares with pt and family later this date. If pt/family decide to continue with restorative care or want further information would recommend a video swallow study in upcoming days. Will closely monitor.    Total Evaluation Time   Total Evaluation Time (Minutes) 38     "

## 2019-05-18 NOTE — PROGRESS NOTES
[facility]    Neurosurgery Progress Note    Date of Service (when I saw the patient): 05/18/2019     Assessment & Plan   Jayson Leija is a 89 year old male who was admitted on 5/17/2019.     Active Problems:    Subdural hematoma (H)    Assessment: Large acute on chronic right subdural hematoma with midline shift    Plan:    Had an extensive conversation this morning with patient's daughter and son  They do not wish to put their father through surgery given his 3 months of slow, progressive clinical decline prior to this episode  Recommend palliative care consult and hospice    Interval History   Stable overnight, episode of worsened confusion and weakness this morning    Physical Exam   Temp: 97.1  F (36.2  C) Temp src: Axillary BP: 126/57 Pulse: 69 Heart Rate: 70 Resp: 13 SpO2: 99 % O2 Device: Oxymask Oxygen Delivery: 2 LPM  Vitals:    05/17/19 1815   Weight: 74.6 kg (164 lb 7.4 oz)     Vital Signs with Ranges  Temp:  [97.1  F (36.2  C)-98.2  F (36.8  C)] 97.1  F (36.2  C)  Pulse:  [69-74] 69  Heart Rate:  [69-75] 70  Resp:  [5-49] 13  BP: (104-196)/(43-93) 126/57  SpO2:  [90 %-100 %] 99 %  I/O last 3 completed shifts:  In: 2311.67 [I.V.:1311.67; IV Piggyback:1000]  Out: 700 [Urine:700]    Heart Rate: 70, Blood pressure 126/57, pulse 69, temperature 97.1  F (36.2  C), temperature source Axillary, resp. rate 13, height 1.829 m (6'), weight 74.6 kg (164 lb 7.4 oz), SpO2 99 %.  164 lbs 7.41 oz  HEENT:  Normocephalic, atraumatic.  PERRLA.  EOM s intact.    Neck:  Supple, non-tender, without lymphadenopathy.  Heart:  No peripheral edema  Lungs:  No SOB  Abdomen:  Non-distended.   Skin:  Warm and dry.  Extremities:  No edema, cyanosis or clubbing.    NEUROLOGICAL EXAMINATION:     Awake, Alert  Mild confusion  Hard of hearing  RUE/RLE 5/5, easily follows commands  LUE 4/5, LLE 2/5  Left drift        Medications     niCARdipine 40 mg in 200 mL 0.9% NaCl 10 mg/hr (05/18/19 2875)     sodium chloride 100  mL/hr at 05/17/19 2245       amLODIPine  2.5 mg Oral Daily     insulin aspart  1-6 Units Subcutaneous Q4H     insulin glargine  10 Units Subcutaneous At Bedtime     levothyroxine  100 mcg Oral Daily     pravastatin  20 mg Oral Daily       Data     All new lab and imaging data was personally reviewed by me.    CBC RESULTS:   Recent Labs   Lab Test 05/18/19  0428   WBC 14.5*   RBC 3.92*   HGB 12.0*   HCT 34.6*   MCV 88   MCH 30.6   MCHC 34.7   RDW 13.8   *     Basic Metabolic Panel:  Lab Results   Component Value Date     05/18/2019      Lab Results   Component Value Date    POTASSIUM 4.1 05/18/2019     Lab Results   Component Value Date    CHLORIDE 112 05/18/2019     Lab Results   Component Value Date    [place] 8.0 05/18/2019     Lab Results   Component Value Date    CO2 19 05/18/2019     Lab Results   Component Value Date    BUN 81 05/18/2019     Lab Results   Component Value Date    CR 2.79 05/18/2019     Lab Results   Component Value Date     05/18/2019     INR:  Lab Results   Component Value Date    INR 1.21 05/18/2019    INR 1.21 05/17/2019    INR 2.48 05/17/2019

## 2019-05-18 NOTE — PLAN OF CARE
Neuro exam unchanged during shift. Repeat head CT completed with results pending. Cardene infusing to keep SBP less than 140. Failed initial bedside swallow due to slurred speech. Morning labs have been reviewed. Neurosurgery to meet with family this morning to discuss further plan of care.

## 2019-05-18 NOTE — PLAN OF CARE
[facility] tx at 1800. BP stable on nicardipine gtt. Neuros- left hemiparesis, left pronator drift. A&ox4. Pt denies HA or pain. Voiding per urinal. Bedrest. Plan- repeat head CT. No surgery planned at this time. Pt's son and daughter updated on POC at bedside, supportive.   Jeimy Lobo RN

## 2019-05-18 NOTE — PLAN OF CARE
Discharge Planner OT   OT:Evaluation completed and treatment initiated. Pt. resides alone in a townhouse, typically indep. W/ I/ADL's, drives;Per chart/family, has had a decline in last 3 months;Pt. typically drives, cooks using microwave(gets MOWS 2X/week), does laundry, had not been cleaning home (per dtr., home dirty) manages medications. Pt. does not use assistive device at baseline.  Patient plan for discharge: Not stated  Current status: Pt. oriented X 2, confused;Pt. able to follow simple directions;Pt. needed max. A for supine-sit, HOB elevated;Pt. leaning L, able to sit EOB for approx. 5 min. W/ overall min. A, CGA for brief periods;completed sit-stand w/ mod-max. A X 2, able to stand for up to 60 secs. W/ mod. A X2, leaning L, posteriorly, needed mod. A/vc's to initiate improved , midline posture. Pt. completed stand-sit w/mod. A X 2, sit-supine w/ max. A X 2, very fatigued after activity. Pt. able to demo. AROM shoulder flexion to grossly 30 degrees, weak L hand . Some difficulty tracking to the L(inconsistent).   Barriers to return to prior living situation: Current level of assist, weakness, impaired cognition, lives alone  Recommendations for discharge: TCU   Rationale for recommendations: Pt. would benefit from continued skilled OT intervention to maximize safety/indep. W/ ADL's/fx.transfers, for eventual discharge to least restrictive environment (pending pt./family goals of care).       Entered by: Flori Da Silva 05/18/2019 1:11 PM

## 2019-05-18 NOTE — CONSULTS
[facility]    Neurosurgery Consultation     Date of Admission:  5/17/2019  Date of Consult (When I saw the patient): 05/17/19    Assessment & Plan   Jayson Leija is a 89 year old male who was admitted on 5/17/2019. I was asked to see the patient for management of subdural hematoma.    Active Problems:    Subdural hematoma (H)    Assessment: Large right acute on chronic subdural hematoma with mass effect and midline shift    Plan:    Reviewed imaging with very large subdural hematoma with mass effect and midline shift  Had a discussion with daughter regarding risk profile of surgery, including possibilities of extended rehab, and significant complications  Also discussed possibility of persistent left hemiparesis and risk of possible worsening deterioration without surgery  She does not wish to pursue surgery tonight unless patient decompensates  She and her brother will come tomorrow to discuss situation with their father, and reach a decision      Code Status    Full Code    Reason for Consult   Reason for consult: I was asked by Dr. Alvarez to evaluate this patient for management of subdural hematoma.    Primary Care Physician   [facility]    Chief Complaint   Left hemiparesis    History of Present Illness   Jayson Leija is a 89 year old male who presents after found down with 2.7 cm acute on chronic SDH with 7 mm midline shift.  Found down today, incontinent, unknown length of time down.      Past Medical History   I have reviewed this patient's medical history and updated it with pertinent information if needed.   Past Medical History:   Diagnosis Date     Atrial fibrillation (H)      Chronic kidney disease      Diabetes (H)      Hypertension      Thyroid disease        Past Surgical History   I have reviewed this patient's surgical history and updated it with pertinent information if needed.  No past surgical history on file.    Prior to Admission Medications    Prior to Admission Medications   Prescriptions Last Dose Informant Patient Reported? Taking?   Calcium Carbonate-Vitamin D (CALCIUM 500 + D) 500-125 MG-UNIT TABS Unknown  Yes Yes   Sig: Take 2 tablets by mouth every 24 hours   amLODIPine (NORVASC) 2.5 MG tablet Unknown  Yes Yes   Sig: Take 2.5 mg by mouth daily    fish oil-omega-3 fatty acids 1000 MG capsule Unknown  Yes Yes   Sig: Take 1 capsule by mouth every 24 hours   levothyroxine (SYNTHROID/LEVOTHROID) 100 MCG tablet Unknown  Yes Yes   Sig: Take 100 mcg by mouth daily    pravastatin (PRAVACHOL) 20 MG tablet Unknown  Yes Yes   Sig: Take 20 mg by mouth daily    warfarin (COUMADIN) 3 MG tablet Unknown  Yes Yes   Sig: Take 3-4.5 mg by mouth daily       Facility-Administered Medications: None     Allergies   No Known Allergies    Social History   I have reviewed this patient's social history and updated it with pertinent information if needed. Jayson Leija  reports that he has never smoked. He has never used smokeless tobacco. He reports that he drinks alcohol. He reports that he does not use drugs.    Family History   I have reviewed this patient's family history and updated it with pertinent information if needed.   No family history on file.    Review of Systems   CONSTITUTIONAL: NEGATIVE for fever, chills, change in weight  INTEGUMENTARY/SKIN: NEGATIVE for worrisome rashes, moles or lesions  EYES: NEGATIVE for vision changes or irritation  ENT/MOUTH: NEGATIVE for ear, mouth and throat problems  RESP: NEGATIVE for significant cough or SOB  BREAST: NEGATIVE for masses, tenderness or discharge  CV: NEGATIVE for chest pain, palpitations or peripheral edema  GI: NEGATIVE for nausea, abdominal pain, heartburn, or change in bowel habits  : NEGATIVE for frequency, dysuria, or hematuria  MUSCULOSKELETAL: NEGATIVE for significant arthralgias or myalgia  NEURO: NEGATIVE for weakness, dizziness or paresthesias  ENDOCRINE: NEGATIVE for temperature intolerance,  skin/hair changes  HEME: NEGATIVE for bleeding problems  PSYCHIATRIC: NEGATIVE for changes in mood or affect    Physical Exam   Temp: 98.2  F (36.8  C) Temp src: Axillary BP: 136/52 Pulse: 69 Heart Rate: 69 Resp: 9 SpO2: 96 % O2 Device: None (Room air)    Vital Signs with Ranges  Temp:  [97.7  F (36.5  C)-98.2  F (36.8  C)] 98.2  F (36.8  C)  Pulse:  [69-72] 69  Heart Rate:  [69-74] 69  Resp:  [8-24] 9  BP: (126-196)/(51-93) 136/52  SpO2:  [92 %-98 %] 96 %  164 lbs 7.41 oz    Heart Rate: 69, Blood pressure 136/52, pulse 69, temperature 98.2  F (36.8  C), temperature source Axillary, resp. rate 9, height 1.829 m (6'), weight 74.6 kg (164 lb 7.4 oz), SpO2 96 %.  164 lbs 7.41 oz  HEENT:  Normocephalic, atraumatic.  PERRLA.  EOM s intact.   Neck:  Supple, non-tender, without lymphadenopathy.  Heart:  No peripheral edema  Lungs:  No SOB  Abdomen:  Non-distended.  Skin:  Warm and dry.  Extremities:  No edema, cyanosis or clubbing.    NEUROLOGICAL EXAMINATION:     Awake, Alert  Oriented x 3  Hard of hearing  RUE/RLE 5/5, easily follows commands  LUE 4/5, LLE 2/5  Left drift        Data   All new lab and imaging data was personally reviewed by me.  CT: 2.7 cm acute on chronic SDH with 7 mm midline shift  CBC RESULTS:   Recent Labs   Lab Test 05/17/19  1402   WBC 18.9*   RBC 4.98   HGB 15.4   HCT 44.9   MCV 90   MCH 30.9   MCHC 34.3   RDW 13.6        Basic Metabolic Panel:  Lab Results   Component Value Date     05/17/2019      Lab Results   Component Value Date    POTASSIUM 5.3 05/17/2019     Lab Results   Component Value Date    CHLORIDE 103 05/17/2019     Lab Results   Component Value Date    [place] 8.7 05/17/2019     Lab Results   Component Value Date    CO2 22 05/17/2019     Lab Results   Component Value Date    BUN 69 05/17/2019     Lab Results   Component Value Date    CR 2.10 05/17/2019     Lab Results   Component Value Date     05/17/2019     INR:  Lab Results   Component Value Date    INR 1.21  05/17/2019    INR 2.48 05/17/2019

## 2019-05-18 NOTE — PROVIDER NOTIFICATION
Notified Dr. Elizabeth- neurologically declining. Increasing lethargy, increased L hemiparesis, new left facial droop. Pt's daughter Tami called. No repeat CT. Will notify Dr. Trujillo to address code status.  1103: text paged Dr. Trujillo re: neuro changes and code status. MD at bedside.   Jeimy Lobo RN

## 2019-05-18 NOTE — PHARMACY-ADMISSION MEDICATION HISTORY
Admission medication history interview status for the 5/17/2019  admission is complete. See EPIC admission navigator for prior to admission medications     Medication history source reliability:Moderate - family, [facility]    Actions taken by pharmacist (provider contacted, etc): Family did not know all specifics but did verify the medications. Doses verified in SureScripts (except pravastatin - but family reports is taking). Warfarin regimen per [facility] note 4/30/19.     Additional medication history information not noted on PTA med list : Family unsure when last doses.    Medication reconciliation/reorder completed by provider prior to medication history? Yes    Time spent in this activity: 10 min    Prior to Admission medications    Medication Sig Last Dose Taking? Auth Provider   amLODIPine (NORVASC) 2.5 MG tablet Take 2.5 mg by mouth daily  Unknown Yes Reported, Patient   Calcium Carbonate-Vitamin D (CALCIUM 500 + D) 500-125 MG-UNIT TABS Take 2 tablets by mouth every 24 hours Unknown Yes Reported, Patient   fish oil-omega-3 fatty acids 1000 MG capsule Take 1 capsule by mouth every 24 hours Unknown Yes Reported, Patient   levothyroxine (SYNTHROID/LEVOTHROID) 100 MCG tablet Take 100 mcg by mouth daily  Unknown Yes Reported, Patient   pravastatin (PRAVACHOL) 20 MG tablet Take 20 mg by mouth daily  Unknown Yes Reported, Patient   warfarin (COUMADIN) 3 MG tablet Take 3-4.5 mg by mouth daily  Unknown Yes Reported, Patient

## 2019-05-18 NOTE — H&P
Admitted:     05/17/2019      PRIMARY CARE PROVIDER:  [facility].      CHIEF COMPLAINT:  Subdural hematoma.      History obtained from the patient and chart review.      HISTORY OF PRESENT ILLNESS:  Jayson Leija is an 89-year-old male with past medical history of complete heart block, status post pacemaker placement, uncontrolled, diet-controlled type 2 diabetes, chronic kidney disease, atrial fibrillation, and hypothyroidism who is transferred from [facility] Emergency Department for evaluation of subacute subdural hematoma.  The patient lives independently and was last seen 4 days ago.  Daughter went to check on him and he was found down in his home incontinent of urine and feces.  He was noted to be altered by EMS.  He was brought to the Emergency Department at [facility].  He was unsure when or how he fell.  .  Workup included a head CT which was notable for a large right-sided subdural hematoma likely representing acute on chronic subdural hemorrhage.  There was associated midline shift to the left and mass effect in the lateral ventricle.  His INR was noted to be 2.48.  He was given Kcentra and trying vitamin K, as well as mannitol.  He was transferred to [facility] ICU for ongoing management.      The patient is evaluated in the intensive care.  He is currently awake.  He is alert and oriented x3.  He follows commands.  He is unclear of the details of how he fell or when he fell.  He does have some left-sided hemiparesis.      PAST MEDICAL HISTORY:   1.  Chronic atrial fibrillation, anticoagulated with warfarin.   2.  Complete heart block, status post pacemaker placement.   3.  Chronic kidney disease, baseline creatinine 2.2-2.5.   4.  Type 2 diabetes, diet-controlled and uncontrolled with an A1c of 8.1.   5.  Hypothyroidism.      PRIOR TO ADMISSION MEDICATIONS:   Medications Prior to Admission   Medication Sig Dispense Refill Last Dose     amLODIPine (NORVASC) 2.5 MG tablet Take 2.5  mg by mouth daily    Unknown     Calcium Carbonate-Vitamin D (CALCIUM 500 + D) 500-125 MG-UNIT TABS Take 2 tablets by mouth every 24 hours   Unknown     fish oil-omega-3 fatty acids 1000 MG capsule Take 1 capsule by mouth every 24 hours   Unknown     levothyroxine (SYNTHROID/LEVOTHROID) 100 MCG tablet Take 100 mcg by mouth daily    Unknown     pravastatin (PRAVACHOL) 20 MG tablet Take 20 mg by mouth daily    Unknown     warfarin (COUMADIN) 3 MG tablet Take 3-4.5 mg by mouth daily    Unknown        ALLERGIES:  NONE.      PAST SURGICAL HISTORY:  Cholecystectomy, cataracts.      FAMILY HISTORY:  Father had macular degeneration.      SOCIAL HISTORY:  He is a previous smoker.  He quit smoking in the 1970s.  Does not drink alcohol.  Lives independently.  Does not use any assistive devices.      REVIEW OF SYSTEMS:  A 10-point review of systems was completed.  Pertinent positives are noted in HPI, all other systems negative.      PHYSICAL EXAMINATION:   GENERAL:  Jayson Leija is a well-developed, well-nourished, frail appearing 89-year-old male who is lying comfortably in bed.   VITAL SIGNS:  Blood pressure is 136/52, heart rate 69, O2 saturation is 96% on room air.   HEENT:  Normocephalic and atraumatic.   CARDIOVASCULAR:  Regular rate and rhythm, no murmurs appreciated.   PULMONARY:  Normal effort.  Lungs are clear.   ABDOMEN:  Normal bowel sounds, nontender, nondistended.   EXTREMITIES:  Does have some left-sided neglect.  Dorsalis pedis and radial pulses are palpable bilaterally.   NEUROLOGIC:  He is awake.  He is alert and oriented x3.  Cranial nerve exam shows no appreciative deficit.  Does have left-sided weakness at 1/5 compared to 5/5 on the right; pronator drift on the left is positive.   SKIN:  Various areas of ecchymosis.      LABORATORY DATA:  Reviewed in Epic.      ASSESSMENT:  Samuel Leija is an 89-year-old male with past medical history of atrial fibrillation on chronic anticoagulation and complete  heart block and diabetes who is admitted to the ICU for evaluation of a subdural hematoma.   1.  Large right-sided subdural hematoma.  The patient had an unwitnessed fall sometime in the past 4 days.  He is chronically anticoagulated with warfarin.  Head CT with large right-sided acute on chronic subdural hematoma with associated midline shift and mass effect.  His INR was reversed with Kcentra and vitamin K.  He is currently alert and oriented, but though does have a left-sided hemiparesis.  He will be admitted to the ICU.  Neurologic checks every 2 hours.  Neurosurgery and critical care will be consulted.  Blood pressure will be kept less than 140 systolic.  PT, OT, and social work will be consulted for discharge planning.   2.  Rhabdomyolysis.  CK noted to be elevated at 2190.  Unclear how long the patient was on the floor.  Received 2 liters of fluid at outside hospital.  We will give an additional liter and start on IV fluids.  Repeat CK in the morning.   3.  Elevated lactic acid.  WBC 4.1 on arrival to the Emergency Department, improved to 2.2 with IV hydration.  He is afebrile.  His vitals are stable.  A urinalysis was negative.  Suspect likely secondary to severe dehydration.  We will repeat in the morning.   4.  Leukocytosis.  WBC 18.9.  Suspect stress response.  Repeat in the a.m.   5.  Uncontrolled, diet-controlled type 2 diabetes.  A1c is 8.1.  He is diet-controlled at baseline.  Blood glucose greater than 300 on arrival.  We will hydrate as above.  We will start Lantus 10 units at night as well as sliding scale insulin with meals and at bedtime.   6.  Elevated troponin.  Troponin trace elevated at 0.15.  Suspect demand.  No complaints of chest pain.  He recently underwent an echocardiogram through [facility] in April which was unremarkable.  We will monitor on telemetry and trend troponins.   7.  Hypertension.  Prior to admission regimen includes Norvasc 2.5 mg daily.  Significantly hypertensive in  the Emergency Department requiring initiation of nicardipine drip. We will continue and resume Norvasc as indicated.   8.  Hypothyroidism.  Continue prior to admission Synthroid.   10.  Deep venous thrombosis prophylaxis:  PCDs.      CODE STATUS:  Full code.      The patient was discussed with Dr. Aniceto Cat of the Hospitalist Service who independently interviewed and examined the patient.  He is in agreement with above plan.         ANICETO CAT MD       As dictated by ALIDA BRADLEY PA-C            D: 2019   T: 2019   MT: LUDMILA      Name:     IZAIAH MARKHAM   MRN:      [id number]        Account:      [id number]   :      1929        Admitted:     2019                   Document: [id number]       cc: [facility]

## 2019-05-18 NOTE — CONSULTS
[facility]      Stroke-NCC Consult Note    Reason for Consult:  Consult for SDH    HPI  Jayson Leija is a 89 year old male with Afib (on Coumadin) CKD, DMII, HTN, who was found down by his daughter with left-sided weakness.  Based upon his uncollected mail, the family suspects that he fel appoximately 2 days ago.   He presented to the [facility] where he was found to have an acute on chronic right subdural causing compression, mass effect, and subfalcine herniation.  He received KCentra (INR 2.5) and mannitold [facility]  Repeat INR 1.21    On my exam, he is sleeping, but when woken he is attentive and making jokes.    Impression  1. Acute on chronic SDH    Recommendations  1. Repeat Head CT in 6 hours or with any neuro change  2. Mannitol 75g if signs of herniation  3. Q1 hour neurochecks  4. NSGY on board      Please contact the Stroke Service-NCC with any questions.    Nacho Yu MD, MS  Vascular Neurology    Text Page [phone])  __________________________________________________    Past Medical History:   Diagnosis Date     Atrial fibrillation (H)      Chronic kidney disease      Diabetes (H)      Hypertension      Thyroid disease        No past surgical history on file.    Medications   Current Facility-Administered Medications   Medication     acetaminophen (TYLENOL) tablet 650 mg    Or     acetaminophen (TYLENOL) solution 650 mg     acetaminophen (TYLENOL) Suppository 650 mg     amLODIPine (NORVASC) tablet 2.5 mg     glucose gel 15-30 g    Or     dextrose 50 % injection 25-50 mL    Or     glucagon injection 1 mg     hydrALAZINE (APRESOLINE) injection 10-20 mg     HYDROmorphone (PF) (DILAUDID) injection 0.3-0.5 mg     insulin aspart (NovoLOG) inj (RAPID ACTING)     insulin aspart (NovoLOG) inj (RAPID ACTING)     insulin glargine (LANTUS PEN) injection 10 Units     labetalol (NORMODYNE/TRANDATE) syringe 10-20 mg     levothyroxine (SYNTHROID/LEVOTHROID) tablet  100 mcg     naloxone (NARCAN) injection 0.1-0.4 mg     niCARdipine 40 mg in 200 mL 0.9% NaCl (CARDENE) infusion     ondansetron (ZOFRAN-ODT) ODT tab 4 mg    Or     ondansetron (ZOFRAN) injection 4 mg     oxyCODONE (ROXICODONE) tablet 5-10 mg     polyethylene glycol (MIRALAX/GLYCOLAX) Packet 17 g     pravastatin (PRAVACHOL) tablet 20 mg     prochlorperazine (COMPAZINE) injection 5 mg    Or     prochlorperazine (COMPAZINE) tablet 5 mg    Or     prochlorperazine (COMPAZINE) Suppository 12.5 mg     senna-docusate (SENOKOT-S/PERICOLACE) 8.6-50 MG per tablet 1 tablet    Or     senna-docusate (SENOKOT-S/PERICOLACE) 8.6-50 MG per tablet 2 tablet     sodium chloride 0.9% infusion       Medications Prior to Admission   Medication Sig Dispense Refill Last Dose     amLODIPine (NORVASC) 2.5 MG tablet Take 2.5 mg by mouth daily    Unknown     Calcium Carbonate-Vitamin D (CALCIUM 500 + D) 500-125 MG-UNIT TABS Take 2 tablets by mouth every 24 hours   Unknown     fish oil-omega-3 fatty acids 1000 MG capsule Take 1 capsule by mouth every 24 hours   Unknown     levothyroxine (SYNTHROID/LEVOTHROID) 100 MCG tablet Take 100 mcg by mouth daily    Unknown     pravastatin (PRAVACHOL) 20 MG tablet Take 20 mg by mouth daily    Unknown     warfarin (COUMADIN) 3 MG tablet Take 3-4.5 mg by mouth daily    Unknown       Allergies   No Known Allergies    Family History       Social History   Social History     Socioeconomic History     Marital status: Single     Spouse name: Not on file     Number of children: Not on file     Years of education: Not on file     Highest education level: Not on file   Occupational History     Not on file   Social Needs     Financial resource strain: Not on file     Food insecurity:     Worry: Not on file     Inability: Not on file     Transportation needs:     Medical: Not on file     Non-medical: Not on file   Tobacco Use     Smoking status: Never Smoker     Smokeless tobacco: Never Used   Substance and Sexual  "Activity     Alcohol use: Yes     Comment: \"very, very rarely\"     Drug use: Never     Sexual activity: Not on file   Lifestyle     Physical activity:     Days per week: Not on file     Minutes per session: Not on file     Stress: Not on file   Relationships     Social connections:     Talks on phone: Not on file     Gets together: Not on file     Attends [employer] service: Not on file     Active member of club or organization: Not on file     Attends meetings of clubs or organizations: Not on file     Relationship status: Not on file     Intimate partner violence:     Fear of current or ex partner: Not on file     Emotionally abused: Not on file     Physically abused: Not on file     Forced sexual activity: Not on file   Other Topics Concern     Not on file   Social History Narrative     Not on file          ROS  The 10 point Review of Systems is negative other than noted in the HPI or here.     PHYSICAL EXAMINATION  B/P:     130/53 (05/17/19 2315)    Heart Rate: 70 (05/17/19 2315)    Pulse: 69 (05/17/19 2315)   Resp:  12 (05/17/19 2315)  Temp: 98.2  F (36.8  C)   Axillary (05/17/19 1815)    Neuro:  Mental status: Sleeping, awakens easily, alert, attentive, oriented x2. Speech is fluent, comprehension and repetition intact. No dysarthria.  Poor historian.  No neglect.  Cranial nerves: EOMI, visual fields full, face symmetric, facial sensation intact, shoulder shrug strong, tongue/uvula midline, hearing intact to conversation.  Motor: 5/5 strength on the right, 4/5 on left  Sensory (assessed via nursing): Intact to light touch in face, arms, and legs  Coordination: Finger to nose intact bilaterally without dysmetria.  Normal heel-shin test bilaterally  Gait: Deferred      Labs/Imaging  Labs and imaging were reviewed and used in developing plan; pertinent results included.  Data   CBC  WBC (10e9/L)   Date Value   05/17/2019 18.9 (H)    RBC Count (10e12/L)   Date Value   05/17/2019 4.98    Hemoglobin (g/dL)   Date " Value   05/17/2019 15.4      Hematocrit (%)   Date Value   05/17/2019 44.9    Platelet Count (10e9/L)   Date Value   05/17/2019 207         BMP  Sodium (mmol/L)   Date Value   05/17/2019 136    Potassium (mmol/L)   Date Value   05/17/2019 5.3    Chloride (mmol/L)   Date Value   05/17/2019 103      Carbon Dioxide (mmol/L)   Date Value   05/17/2019 22    Glucose (mg/dL)   Date Value   05/17/2019 389 (H)    Urea Nitrogen (mg/dL)   Date Value   05/17/2019 69 (H)      Creatinine (mg/dL)   Date Value   05/17/2019 2.10 (H)    Calcium (mg/dL)   Date Value   05/17/2019 8.7         INR Troponin I A1C   INR (no units)   Date Value   05/17/2019 1.21 (H)   05/17/2019 2.48 (H)    No results found for: TROPI Hemoglobin A1C (%)   Date Value   05/17/2019 7.9 (H)        Liver Panel  Protein Total (g/dL)   Date Value   05/17/2019 8.0    Albumin (g/dL)   Date Value   05/17/2019 3.2 (L)    Bilirubin Total (mg/dL)   Date Value   05/17/2019 3.4 (H)      Alkaline Phosphatase (U/L)   Date Value   05/17/2019 82    AST (U/L)   Date Value   05/17/2019 93 (H)    ALT (U/L)   Date Value   05/17/2019 44      No results found for: BILIDIRECT       Lipid Profile  No results found for: CHOL No results found for: HDL No results found for: LDL   No results found for: TRIG No results found for: CHOLHDLRATIO           I have personally spent a total of 45 minutes critical care time supervising the care of his SDH with midline shift and mass effect

## 2019-05-18 NOTE — PROGRESS NOTES
05/18/19 1400   Quick Adds   Type of Visit Initial PT Evaluation   Living Environment   Lives With alone   Living Arrangements house   Home Accessibility stairs to enter home  (Has steps to downstairs, does not need to use )   Number of Stairs, Main Entrance 1   Stair Railings, Main Entrance none   Transportation Anticipated car, drives self   Self-Care   Usual Activity Tolerance moderate   Current Activity Tolerance poor   Equipment Currently Used at Home walker, rolling   Functional Level Prior   Ambulation 0-->independent   Transferring 0-->independent   Toileting 0-->independent   Bathing 0-->independent   Communication 0-->understands/communicates without difficulty   Swallowing 0-->swallows foods/liquids without difficulty   Fall history within last six months yes   Number of times patient has fallen within last six months 4   Which of the above functional risks had a recent onset or change? 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 Patient with PMH of atrial fibrillation on chronic anticoagulation and complete heart block and diabetes.    Precautions/Limitations fall precautions   Weight-Bearing Status - LLE full weight-bearing   Weight-Bearing Status - RLE full weight-bearing   General Observations Patient supine in bed upon arrival of therapist, sleepy, but wakes to therapist voice; able to follow cues    Cognitive Status Examination   Level of Consciousness lethargic/somnolent   Follows Commands and Answers Questions 100% of the time;able to follow single-step instructions   Pain Assessment   Patient Currently in Pain No   Integumentary/Edema  "  Integumentary/Edema no deficits were identifed   Posture    Posture Forward head position;Protracted shoulders   Range of Motion (ROM)   ROM Comment B LE AROM limited 2/2 to global weakness   Strength   Strength Comments Not formally assessed but generally weak in B LE, requiring max A x 2 to stand at EOB with bilateral flex knees noted    Bed Mobility   Bed Mobility Comments Supine>sit with max A x 2    Transfer Skills   Transfer Comments Sit>stand with max A x 2 and FWW   Gait   Gait Comments Not assessed    Balance   Balance Comments Sitting balance fair with mod A to CGA, L lean noted at times    Sensory Examination   Sensory Perception Comments Denies numbness/tingling    General Therapy Interventions   Planned Therapy Interventions balance training;bed mobility training;gait training;strengthening;transfer training;home program guidelines   Clinical Impression   Criteria for Skilled Therapeutic Intervention yes, treatment indicated   PT Diagnosis Impaired mobility and gait    Influenced by the following impairments weakness, deconditioning    Functional limitations due to impairments bed mobility, transfers, gait   Clinical Presentation Evolving/Changing   Clinical Presentation Rationale Based on PMH, current presentation, and social support    Clinical Decision Making (Complexity) Low complexity   Therapy Frequency` daily   Predicted Duration of Therapy Intervention (days/wks) 5 days   Anticipated Discharge Disposition Transitional Care Facility   Risk & Benefits of therapy have been explained Yes   Patient, Family & other staff in agreement with plan of care Yes   [facility] [employer] TM \"6 Clicks\"   2016, Trustees of [facility], under license to [employer].  All rights reserved.   6 Clicks Short Forms Basic Mobility Inpatient Short Form   [facility] AM-PAC  \"6 Clicks\" V.2 Basic Mobility Inpatient Short Form   1. Turning from your back to your side while in a flat bed without using " bedrails? 2 - A Lot   2. Moving from lying on your back to sitting on the side of a flat bed without using bedrails? 2 - A Lot   3. Moving to and from a bed to a chair (including a wheelchair)? 1 - Total   4. Standing up from a chair using your arms (e.g., wheelchair, or bedside chair)? 2 - A Lot   5. To walk in hospital room? 1 - Total   6. Climbing 3-5 steps with a railing? 1 - Total   Basic Mobility Raw Score (Score out of 24.Lower scores equate to lower levels of function) 9   Total Evaluation Time   Total Evaluation Time (Minutes) 5

## 2019-05-18 NOTE — PLAN OF CARE
Neuros waxing and waning- left hemiparesis, new left facial droop, left pronator drift. Pt forgetful and confused. Intermittent lethargy. Denies pain or headache. Dangled at EOB w/ therapies w/ A+2. Incontinent of urine this shift. NPO. IVF infusing. Nicardipine stopped, SBP w/in parameters. Code status changed to DNR/DNI. Plan- repeat CT tomorrow AM. Pt's children updated extensively on POC at bedside, supportive.   Jeimy Lobo RN

## 2019-05-18 NOTE — PLAN OF CARE
Discharge Planner PT   Patient plan for discharge: Not stated  Current status: Orders received, chart reviewed, PT evaluation completed and treatment initiated. Patient admitted on 5/17/19 for Large acute on chronic right subdural hematoma with midline shift. Patient lives in a house alone with 1 step to enter and all needs met on single level of home. Patient is independent at baseline with no AD, still drives.     Patient supine in bed upon arrival of therapist, sleepy but wakes to therapist voice and agreeable to working with PT. Follows single-step cues throughout session. Vitals monitored throughout session to assure SBP within parameters (<140); all vitals within parameters with mobility during session today. Attempted to complete B LE exercises but unable to complete fully as patient falling asleep. Supine<>sit with max A x 2. Patient able to sit at EOB for 3-4 minutes with CGA to mod A for sitting balance 2/2 to L lean, at times able to correct with cues. Sit<>stand from EOB with FWW and max A x 2, A to block L knee. Patient able to stand for ~10 seconds and then needing to sit, only able to clear bottom and standing with bilaterally flexed knees. Patient in supine at end of session, total A x 2 to boost up to head of bed. All needs in reach and bed alarm on upon therapist exit.   Barriers to return to prior living situation: Current level of A, decreased tolerance to activity, decreased balance   Recommendations for discharge: TCU  Rationale for recommendations: Patient previously independent at baseline and now requiring mod A to max A x 2 for all mobility 2/2 to increased weakness. Patient would benefit from continued skilled therapy at TCU to further improve strength, balance, and independence with mobility and ambulation to address functional limitations and decrease falls risk.         Entered by: Alize Lay 05/18/2019 2:18 PM

## 2019-05-19 NOTE — PLAN OF CARE
Transfer from ICU at 1800. Pt here with large R acute and chronic SDH with mild midline shift. Pt A&O x4, forget, [place]. VSS, on RA. Denies pain. CMS and Neuro's exhibit slight L facial droop, LUE hemiparesis, 3/5, moderate hand , L pronator drift, LLE hemiparesis, 2/5, weak plantar/dorsi flexion. NPO. Oral care given. Incont or urine. Coccyx Mepilex CDI. Bruising BUE/BLE with multiple abrasions, and scabs. A2 with T&R, lift. Palliative following. Currently recommending TCU. Nrsg will continue to monitor.

## 2019-05-19 NOTE — PROGRESS NOTES
[facility]      Stroke-NCC Progress Note    Summary  Jayson Leija is a 89 year old male with Afib (on Coumadin) CKD, DMII, HTN, who was found down by his daughter with left-sided weakness, likely down for up to 2 days, and presented to [facility] 5/17/2019  where he was found to have an acute on chronic right subdural causing compression, mass effect, and subfalcine herniation.  He received KCentra (INR 2.5) and mannitol and was transferredt o [facility].  Repeat INR 1.21    5/17: On my exam, he is sleeping, but when woken he is attentive and making jokes.    5/18: more drowsy in the morning but still able to follow commands, later exam improved, pt able to participate w therapies and sit at EOB, etc. Discussed with family, they would not elect to do craniotomy if pt were to decompensate. We indicated that respiratory compromise may be a sequelae of this, and thus, subsequent intubation would not be in line with goals of care. Family members indicate wishes to be DNR/DNI. Will continue with full medical management.     Impression  - Acute on chronic SDH in the setting of coumadin use, potentially traumatic but unclear.  - Rhabdomyolysis, improved    Recommendations  - q4hr neuro checks  - SBP<180  - Mannitol bolus if patient decompensates and stat head CT  - CT tomorrow morning  - PT/OT/SLP  - no antiplatelets/anticoagulation      Please contact the Stroke Service-NCC with any questions.    Mckenzie Pro MD  Stroke fellow      Seen and discussed with Dr. Yu.  __________________________________________________    Medications   Current Facility-Administered Medications   Medication     acetaminophen (TYLENOL) tablet 650 mg    Or     acetaminophen (TYLENOL) solution 650 mg     acetaminophen (TYLENOL) Suppository 650 mg     amLODIPine (NORVASC) tablet 2.5 mg     glucose gel 15-30 g    Or     dextrose 50 % injection 25-50 mL    Or     glucagon injection 1 mg     hydrALAZINE (APRESOLINE)  injection 10-20 mg     HYDROmorphone (PF) (DILAUDID) injection 0.3-0.5 mg     insulin aspart (NovoLOG) inj (RAPID ACTING)     insulin glargine (LANTUS PEN) injection 16 Units     labetalol (NORMODYNE/TRANDATE) syringe 10-20 mg     levothyroxine (SYNTHROID/LEVOTHROID) tablet 100 mcg     naloxone (NARCAN) injection 0.1-0.4 mg     niCARdipine 40 mg in 200 mL 0.9% NaCl (CARDENE) infusion     ondansetron (ZOFRAN-ODT) ODT tab 4 mg    Or     ondansetron (ZOFRAN) injection 4 mg     oxyCODONE (ROXICODONE) tablet 5-10 mg     polyethylene glycol (MIRALAX/GLYCOLAX) Packet 17 g     pravastatin (PRAVACHOL) tablet 20 mg     prochlorperazine (COMPAZINE) injection 5 mg    Or     prochlorperazine (COMPAZINE) tablet 5 mg    Or     prochlorperazine (COMPAZINE) Suppository 12.5 mg     senna-docusate (SENOKOT-S/PERICOLACE) 8.6-50 MG per tablet 1 tablet    Or     senna-docusate (SENOKOT-S/PERICOLACE) 8.6-50 MG per tablet 2 tablet     sodium chloride 0.9% infusion       Medications Prior to Admission   Medication Sig Dispense Refill Last Dose     amLODIPine (NORVASC) 2.5 MG tablet Take 2.5 mg by mouth daily    Unknown     Calcium Carbonate-Vitamin D (CALCIUM 500 + D) 500-125 MG-UNIT TABS Take 2 tablets by mouth every 24 hours   Unknown     fish oil-omega-3 fatty acids 1000 MG capsule Take 1 capsule by mouth every 24 hours   Unknown     levothyroxine (SYNTHROID/LEVOTHROID) 100 MCG tablet Take 100 mcg by mouth daily    Unknown     pravastatin (PRAVACHOL) 20 MG tablet Take 20 mg by mouth daily    Unknown     warfarin (COUMADIN) 3 MG tablet Take 3-4.5 mg by mouth daily    Unknown          PHYSICAL EXAMINATION  Temp:  [97.1  F (36.2  C)-98.3  F (36.8  C)] 97.6  F (36.4  C)  Pulse:  [68-74] 69  Heart Rate:  [69-75] 70  Resp:  [5-49] 15  BP: (111-148)/(51-70) 148/60  SpO2:  [90 %-100 %] 91 %   Neuro:  Mental status: awake, alert, some mild difficult following exams. Poor historian.  No neglect.  Cranial nerves: EOMI, visual fields full, face  symmetric, no dysarthria, hearing intact to conversation.  Motor: 5/5 strength on the right, 4/5 on left w mild pronator drift.   Sensory: Intact to light touch in face, arms, and legs  Coordination: no ataxia  Gait: Deferred      Labs/Imaging  Labs and imaging were reviewed and used in developing plan; pertinent results included.  Data   CBC  WBC (10e9/L)   Date Value   05/18/2019 14.5 (H)   05/17/2019 18.9 (H)    RBC Count (10e12/L)   Date Value   05/18/2019 3.92 (L)   05/17/2019 4.98    Hemoglobin (g/dL)   Date Value   05/18/2019 12.0 (L)   05/17/2019 15.4      Hematocrit (%)   Date Value   05/18/2019 34.6 (L)   05/17/2019 44.9    Platelet Count (10e9/L)   Date Value   05/18/2019 133 (L)   05/17/2019 207         BMP  Sodium (mmol/L)   Date Value   05/18/2019 141   05/17/2019 136    Potassium (mmol/L)   Date Value   05/18/2019 4.1   05/17/2019 5.3    Chloride (mmol/L)   Date Value   05/18/2019 112 (H)   05/17/2019 103      Carbon Dioxide (mmol/L)   Date Value   05/18/2019 19 (L)   05/17/2019 22    Glucose (mg/dL)   Date Value   05/18/2019 303 (H)   05/17/2019 389 (H)    Urea Nitrogen (mg/dL)   Date Value   05/18/2019 81 (H)   05/17/2019 69 (H)      Creatinine (mg/dL)   Date Value   05/18/2019 2.79 (H)   05/17/2019 2.10 (H)    Calcium (mg/dL)   Date Value   05/18/2019 8.0 (L)   05/17/2019 8.7         INR Troponin I A1C   INR (no units)   Date Value   05/18/2019 1.21 (H)   05/17/2019 1.21 (H)   05/17/2019 2.48 (H)    No results found for: TROPI Hemoglobin A1C (%)   Date Value   05/18/2019 8.5 (H)   05/17/2019 7.9 (H)        Liver Panel  Protein Total (g/dL)   Date Value   05/18/2019 6.0 (L)   05/17/2019 8.0    Albumin (g/dL)   Date Value   05/18/2019 2.7 (L)   05/17/2019 3.2 (L)    Bilirubin Total (mg/dL)   Date Value   05/18/2019 2.9 (H)   05/17/2019 3.4 (H)      Alkaline Phosphatase (U/L)   Date Value   05/18/2019 64   05/17/2019 82    AST (U/L)   Date Value   05/18/2019 38   05/17/2019 93 (H)    ALT (U/L)   Date  Value   05/18/2019 31   05/17/2019 44      No results found for: BILIDIRECT       Lipid Profile  No results found for: CHOL No results found for: HDL No results found for: LDL   No results found for: TRIG No results found for: CHOLHDLRATIO

## 2019-05-19 NOTE — PLAN OF CARE
Discharge Planner SLP   Patient plan for discharge: did not discuss  Current status: Swallow session completed at bedside with pt and family present. Significantly more awake this date. Trialed thin liquids, honey thick liquid and puree solids. Overt coughing on 100% of thin liquid trials. Wet vocal quality/throat clearing still on 50% of honey/puree. Swallow is delayed ranging from 8-15 seconds to trigger. Continue to recommend NPO and pursue video swallow study to obtain objective information re: swallow function and assist in determining further POC; scheduled for today at 10AM. Verbal orders received from MD. Pt and family consent to testing.   Barriers to return to prior living situation: dysphagia, NPO status/nutrition  Recommendations for discharge: consider ARU   Rationale for recommendations: pt significantly below baseline for swallow function - NPO, below baseline for speech/language, good family support. Will monitor ability to participate in 3 hours/therapy a day.        Entered by: Carmela Alcantara 05/19/2019 9:39 AM

## 2019-05-19 NOTE — PROGRESS NOTES
[facility]  Hospitalist Progress Note  Aniceto Trujillo MD  05/19/2019    Assessment & Plan   ASSESSMENT:  Samuel Leija is an 89-year-old male with past medical history of atrial fibrillation on chronic anticoagulation and complete heart block and diabetes who is admitted to the ICU for evaluation of a subdural hematoma.   1.  Large right-sided subdural hematoma with midline shift.   -- unwitnessed fall sometime in the past 4 days.  He is chronically anticoagulated with warfarin. -- Head CT with large right-sided acute on chronic subdural hematoma with associated midline shift and mass effect.   -- His INR was reversed with Kcentra and vitamin K.  -- somnolent but arouseable  -- family leaning towards non-operative, but patient has some recovery so not moving toward comfort cares  -- received mannitol  -- weaned off nicardipine gtt.  -- plan to transfer to  today   2. MORENA/CKD stage III with  Rhabdomyolysis.  CK noted to be elevated at 2190.   -- Received 2 liters of fluid at outside hospital, CK down to 600's.   -- continue IVF given NPO status   -- creatinine elevation trend noted 2.1 > 2.79 > 3.13, UA showed minimal cells, + glucosuria, 200 protein, 2 wbc, 3 rbc and 1 hyaline casts  -- urine output is picking up, possible ATN, pre-renal from being down.  3.  Elevated lactic acid.    -- 4.1 on arrival to the Emergency Department, improved to 2.2 with IV hydration.   -- He is afebrile.  His vitals are stable.  A urinalysis was negative.  Suspect likely secondary to severe dehydration.     4.  Leukocytosis.   -- WBC 18.9 >> 14.5 >> 9.8   -- Suspect stress response.   -- no fever, UA shows no evidence of UTI  5.  Uncontrolled, diet-controlled type 2 diabetes.  A1c is 8.1.   -- He is diet-controlled at baseline.  Blood glucose greater than 300 on arrival.   -- hydration and given Lantus 10 units at night  -- continue sliding scale insulin until q4 hrs while npo or while on tube feedings.  6.  Dysphagia  -- failed swallow study and videoswallow  -- plan for temporary NG FT after discussion with daughter  -- nutrition to see for tube feeds  7. Elevated troponin.   -- Troponin trace elevated at 0.15.  Suspect demand.  No complaints of chest pain.  He recently underwent an echocardiogram through [facility] in April which was unremarkable.  We will monitor on telemetry and trend troponins.   8.  Hypertension.  Prior to admission regimen includes Norvasc 2.5 mg daily.   -- Significantly hypertensive in the Emergency Department requiring initiation of nicardipine drip.   -- weaned off  nicardipine gtt  -- continue norvasc after FT placed  9. Hx of atrial fibrillation  -- rate controlled  -- discontinued warfarin, likely will not be a candidate in the future.  10.  Hypothyroidism.   -- Continue prior to admission Synthroid via FT  11.  Deep venous thrombosis prophylaxis:  PCDs.   12. Disposition  -- transfer out of ICU today  -- will need placement.     CODE STATUS:  DNR/DNI       Interval History   -- failed bedside and videoswallow studies  -- discussed with daughter and speech  -- patient responsive and communicative    -Data reviewed today: I reviewed all new labs and imaging over the last 24 hours. I personally reviewed no images or EKG's today.    Physical Exam   Heart Rate: 75, Blood pressure 158/72, pulse 70, temperature 96.9  F (36.1  C), temperature source Axillary, resp. rate 11, height 1.829 m (6'), weight 74.6 kg (164 lb 7.4 oz), SpO2 97 %.  Vitals:    05/17/19 1815   Weight: 74.6 kg (164 lb 7.4 oz)     Vital Signs with Ranges  Temp:  [96.9  F (36.1  C)-97.8  F (36.6  C)] 96.9  F (36.1  C)  Pulse:  [69-79] 70  Heart Rate:  [69-76] 75  Resp:  [7-25] 11  BP: (137-169)/(55-78) 158/72  SpO2:  [91 %-98 %] 97 %  I/O's Last 24 hours  I/O last 3 completed shifts:  In: 2200 [I.V.:2200]  Out: 1375 [Urine:1375]    Constitutional: Left facial droop, follows some commands  Respiratory: Clear to auscultation  bilaterally, no crackles or wheezing  Cardiovascular: Regular, paced  GI: Normal bowel sounds, soft, non-distended, non-tender  Skin/Integumen: No rashes, no cyanosis, no edema  Other: LUE weakness     Medications   All medications were reviewed.    lactated ringers         amLODIPine  2.5 mg Oral Daily     insulin aspart  1-12 Units Subcutaneous Q4H     insulin glargine  16 Units Subcutaneous At Bedtime     levothyroxine  100 mcg Oral Daily     pravastatin  20 mg Oral Daily        Data   Recent Labs   Lab 05/19/19  0441 05/18/19  0428 05/17/19 2006 05/17/19  1442 05/17/19  1402   WBC 9.8 14.5*  --   --  18.9*   HGB 11.7* 12.0*  --   --  15.4   MCV 89 88  --   --  90   * 133*  --   --  207   INR  --  1.21* 1.21*  --  2.48*   * 141  --   --  136   POTASSIUM 3.8 4.1  --   --  5.3   CHLORIDE 121* 112*  --   --  103   CO2 18* 19*  --   --  22   BUN 91* 81*  --   --  69*   CR 3.13* 2.79*  --   --  2.10*   ANIONGAP 12 10  --   --  11   [place] 8.4* 8.0*  --   --  8.7   * 303*  --   --  389*   ALBUMIN  --  2.7*  --   --  3.2*   PROTTOTAL  --  6.0*  --   --  8.0   BILITOTAL  --  2.9*  --   --  3.4*   ALKPHOS  --  64  --   --  82   ALT  --  31  --   --  44   AST  --  38  --   --  93*   TROPONIN  --   --   --  0.15*  --        Recent Results (from the past 24 hour(s))   CT Head w/o Contrast    Narrative    CT SCAN OF THE HEAD WITHOUT CONTRAST   5/19/2019 5:21 AM     HISTORY: Intracranial hemorrhage, known, follow up.    TECHNIQUE:  Axial images of the head and coronal reformations without  IV contrast material. Radiation dose for this scan was reduced using  automated exposure control, adjustment of the mA and/or kV according  to patient size, or iterative reconstruction technique.    COMPARISON: Multiple head CTs 5/17/2019.    FINDINGS:  Right convexity subdural hematoma is unchanged in size  compared to the prior exam again measuring approximately 2.7 cm in  maximum thickness. The degree of mass effect on  the right cerebral  hemisphere, slight sub-pulsing, and slight (7 mm) of right-to-left  midline shift is unchanged. No new sites of hemorrhage. Background of  volume loss is present with patchy white matter hypoattenuation likely  representing chronic small vessel ischemic change. No evidence of  acute ischemia. The calvarium, skull base, paranasal sinuses, and  extracranial soft tissues are unremarkable.      Impression    IMPRESSION:   No change of right convexity subacute on chronic  subdural hematoma with mild mass effect and midline shift.    AL LAGOS MD   XR Video Swallow w/o Esophagram    Narrative    VIDEO SWALLOWING EVALUATION   5/19/2019 10:16 AM     HISTORY: Dysphagia, aspiration risk.    COMPARISON: None.    FLUOROSCOPY TIME: 2.4 minutes.  SPOT IMAGES OR CINE RUNS: 6.    FINDINGS:    Thin: Aspiration (silent).    Nectar: Aspiration (silent).    Honey: Penetration and eventual aspiration of probable residue.    Pudding: Penetration. No aspiration.    Semisolid: Not administered.    Solid: Not administered.    This study only includes the cervical esophagus.    MD Aniceto HOWARD MD  Text Page  (7am to 6pm)

## 2019-05-19 NOTE — PLAN OF CARE
Discharge Planner SLP   Patient plan for discharge: did not discuss  Current status: Video swallow study completed with thin liquids, nectar thick liquids, honey thick liquids, pure solids. Pt currently presents with severe oropharyngeal dysphagia. Reduced oral control, reduced cohesive bolus formulation, moderately reduced base of tongue retraction and premature spillage to the pyriform sinuses with moderate delay noted with all assessed. Hyolaryngeal elevation/excursion both moderately reduced and epiglottic inversion was absent resulting in inadequate laryngeal closure and penetration or aspiration across all: Before/during silent aspiration noted with thin liquids and nectar thick liquids - wet vocal quality and at times delayed cough noted. Cued additional coughs required however coughs did not clear residuals from below the vocal folds. Penetration progressing to the cords and below the cords AFTER the swallow noted with honey thick liquids. Flash penetration with puree solids. Chin tuck trialed, pt with limited neck ROM therefore full positioning not achieved however it did not appear to assist. Min - severe pharyngeal residuals noted as viscosities increased; cued extra swallow reducing amount of residuals but they never clear.     Swallow treatment initiated after evaluation wish emphasis on education re: results of video, risks of aspiration and discussion re: options for further POC. Dtr called on phone per pt approval, and is interested in NPO with possible TF and exercise program. Pt interested in continuing PO despite discussed risks of aspiration. Would benefit from further discussion of goals of care. There is a palliative consult in place.      - If goals of care are to reduce aspiration and continue full restorative care: recommend NPO with consideration of alternative means of nutrition with initiation of intensive swallow exercise program. Allow 4-5 tsps of HONEY thick WATER per hour with 3-4  extra swallows and cued throat clears for oral comfort.     - if goals of care are to avoid TF but continue with safest possible diet with understanding of known risks of aspiration: recommend cautious initiation of clear liquids, honey thick liquids via tsp with strict strategy use: small tsps, 3-4 extra swallows per every sip, periodic cued cough and re-swallow    - if goals of care are for a comfort/pleasure approach, allow any PO with understanding of risks of aspiration    Barriers to return to prior living situation: below baseline, aspiration risk/dysphagia, nutrition, discussion re: goals of care.   Recommendations for discharge: ARU for intensive dysphaiga intervention pending goals of care  Rationale for recommendations: pt significantly below baseline for swallow function - NPO, below baseline for speech/language, good family support. Pt tolerating lengthy sessions this date.        Entered by: Carmela Alcantara 05/19/2019 10:40 AM

## 2019-05-19 NOTE — PROGRESS NOTES
Video Swallow Study:      05/19/19 1033   General Information   Onset Date 05/17/19   Start of Care Date 05/18/19   Comments Overt signs/sx aspiration with all PO at bedside therefore VFSS ordered.    VFSS Eval: Radiology   Radiologist Dr. Gonzáles   Views Taken left lateral   Physical Location of Procedure [facility]   VFSS Eval: Thin Liquid Texture Trial   Mode of Presentation, Thin Liquid spoon;fed by clinician   Order of Presentation 1   Preparatory Phase Poor bolus control   Oral Phase, Thin Liquid Poor AP movement;Premature pharyngeal entry   Pharyngeal Phase, Thin Liquid Delayed swallow reflex;Residue in valleculae;Residue in pyriform sinus   Rosenbek's Penetration Aspiration Scale: Thin Liquid Trial Results 8 - contrast passes glottis, visible subglottic residue remains, absent patient response (aspiration)   VFSS Eval: Nectar Thick Liquid Texture Trial   Mode of Presentation, Nectar spoon;fed by clinician   Order of Presentation 2   Preparatory Phase Poor bolus control   Oral Phase, Nectar Poor AP movement;Premature pharyngeal entry   Pharyngeal Phase, Nectar Delayed swallow reflex;Residue in valleculae;Residue in pyriform sinus   Rosenbek's Penetration Aspiration Scale: Nectar-Thick Liquid Trial Results 8 - contrast passes glottis, visible subglottic residue remains, absent patient response (aspiration)   VFSS Eval: Honey Thick Texture Trial   Mode of Presentation, Honey spoon;cup;fed by clinician;self-fed   Order of Presentation 3, 4, 6   Preparatory Phase Poor bolus control   Oral Phase, Honey Poor AP movement;Premature pharyngeal entry   Pharyngeal Phase, Honey Delayed swallow reflex;Pharyngeal wall coating;Residue in valleculae;Residue in pyriform sinus   Rosenbek's Penetration Aspiration Scale: Honey Trial Results 8 - contrast passes glottis, visible subglottic residue remains, absent patient response (aspiration)   VFSS Eval: Puree Solid Texture Trial   Mode of Presentation, Puree spoon;fed by clinician    Order of Presentation 5   Preparatory Phase Poor bolus control   Oral Phase, Puree Poor AP movement;Premature pharyngeal entry   Pharyngeal Phase, Puree Delayed swallow reflex;Pharyngeal wall coating;Residue in valleculae;Residue in pyriform sinus   Rosenbek's Penetration Aspiration Scale: Puree Food Trial Results 2 - contrast enters airway, remains above the vocal cords, no residue remains (penetration)   General Therapy Interventions   Planned Therapy Interventions Dysphagia Treatment   Dysphagia treatment Oropharyngeal exercise training;Modified diet education;Instruction of safe swallow strategies   Swallow Eval: Clinical Impressions   Skilled Criteria for Therapy Intervention Skilled criteria met.  Treatment indicated.   Functional Assessment Scale (FAS) 1   Dysphagia Outcome Severity Scale (JESUS ALBERTO) Level 1 - JESUS ALBERTO   Treatment Diagnosis severe oropharyngeal dysphagia   Diet texture recommendations NPO   Therapy Frequency daily   Predicted Duration of Therapy Intervention (days/wks) 1 week   Anticipated Discharge Disposition   (ARU)   Risks and Benefits of Treatment have been explained. Yes   Patient, family and/or staff in agreement with Plan of Care Yes   Clinical Impression Comments Video swallow study completed with thin liquids, nectar thick liquids, honey thick liquids, pure solids. Pt currently presents with severe oropharyngeal dysphagia. Reduced oral control, reduced cohesive bolus formulation, moderately reduced base of tongue retraction and premature spillage to the pyriform sinuses with moderate delay noted with all assessed. Hyolaryngeal elevation/excursion both moderately reduced and epiglottic inversion was absent resulting in inadequate laryngeal closure and penetration or aspiration across all. Before/during silent aspiration noted with thin liquids and nectar thick liquids - wet vocal quality and at times delayed cough noted. Cued additional coughs required however coughs did not clear residuals  from below the vocal folds. Penetration progressing to the cords and below the cords AFTER the swallow noted with honey thick liquids. Flash penetration with puree solids. Chin tuck trialed, pt with limited neck ROM therefore full positioning not achieved however it did not appear to assist. Min - severe pharyngeal residuals noted as viscosities increased; cued extra swallow reducing amount of residuals but they never clear. Discussion held with dtr (on phone with pt permission) and with pt in room after evaluation re: discussing of options. Dtr interested in NPO with possible TF and exercise program. Pt interested in continuing PO despite discussed risks of aspiration. Would benefit from further discussion of goals of care with MD.    Total Evaluation Time   Total Evaluation Time (Minutes) 27

## 2019-05-19 NOTE — PLAN OF CARE
Alert and confused, can answer orientation questions but short term memory is poor. Keeps asking for pepsi despite the frequent reminders that he failed his swallow and is aspirating. SIMS, left side is weaker than the right. Slight left droop. Neuro exam is unchanged. Vitals WNL, apneic while sleeping requiring O2, 100% v paced. Family updated.

## 2019-05-20 NOTE — PLAN OF CARE
DX: Fall, SDH.  Neuros stable.  Disorientated to place and situation.  Cooperative.  Hallucinations x 1 episode when family was here, improved with re-orientation.  LUE, LLE weakness.  IV infiltrated with left hand and wrist edema.  Left arm bruised.  Back wound with meplex, coccyx wound with meplex.  Incontinent of bowel & bladder.  NPO.  NG feeding tube placement today.  Discharge plans pending.    1744:  NG Tube feeding started at 1540, 15cc hour.  Pt slightly more confused at this time, re-orientated and frequent checks.

## 2019-05-20 NOTE — PROGRESS NOTES
[facility] Nurse Inpatient Wound Assessment     Initial Assessment of wound(s) on pt's:   Upper mid back and L of midline sacrum        Data:   Patient History:      per MD note(s): 89 year old male with Afib (on Coumadin) CKD, DMII, HTN, who was found down by his daughter with left-sided weakness.  Based upon his uncollected mail, the family suspects that he fell appoximately 2 days ago.   He presented to the 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  He received KCentra (INR 2.5) and mannitold and was transferredt o [facility].  Repeat INR 1.21     Ben Risk Assessment  Sensory Perception: 3-->slightly limited    Moisture: 3-->occasionally moist   Activity: 2-->chairfast     Mobility: 3-->slightly limited   Nutrition: 3-->adequate   Ben Score: 16    Positioning: Pillows,     Mattress:  Standard , Atmos Air mattress    Moisture Management:  Diaper    Catheter secured? n/a    Current Diet / Nutrition:       None          Labs:   Recent Labs   Lab Test 05/20/19  1041  05/18/19  0428   ALBUMIN  --   --  2.7*   HGB 12.8*   < > 12.0*   INR  --   --  1.21*   WBC 9.1   < > 14.5*   A1C  --   --  8.5*    < > = values in this interval not displayed.       Wound Assessment (location):   Midline upper back and mid-left sacrum  Wound History:  Pt was found down at home, possibly down 2 days  Wound on upper back is a large wound with speckling dry to moist serous scabs and mix of blanchable to non blanchable maroon erythema.  To the right there are two linear maroon, non blanchable lines of erythema- to serous scabs, each about 5cm long  Painful to touch.  Midline wound measures about 7.5cm x 3.5cm x 0.2cm  Midline- left sacral area- large area of nonblanchable dark maroon to purple erythema, up to 5.5 cm x 4cm and a patch of loose, tan slough/ eschar in the center of erythema, this tissue measures about 2cm x  "1.6cm x 0.2cm+  Painful to the touch   05-20-19 Midline to right spine                    05-20-19  midline-left sacrum                    Intervention:     Patient's chart evaluated.      Wound(s) assessed with the RN as noted above, sacral Mepilex removed for asesssment    Wound Care: cleaned sites with skin prep, reapplied clean Mepilex dressings     Orders  Written    Supplies  reviewed    Discussed plan of care with Patient and Nurse             All patient / family questions answered:  YES          Assessment:          Two wounds on the backside of pt that are likely a mix of trauma from the fall, perhaps friction from scooting and probably pressure components.  Both wounds clean without s/s of infection but I am worried about the sacral wound.  Sacral wound likely to open and be on the deeper side.  Will use Iodosorb Gel to dry out wound and protect from infection, will use Mepilex Dressing on both wounds, see plan below        Plan:     Nursing to notify the Provider(s) and re-consult the [facility] Nurse if wound(s) deteriorate(s) or if the wound care plan needs reevaluation.    Plan of care for wound located on upper mid back and sacral wounds: every other day, even days and prn  1. Clean wound with saline or MicKlenz Spray, pat dry  2. Paint with No Sting Skin Prep (#[id number])) and allow to dry thoroughly  3. Apply small dab of Iodosorb Gel (#[id number]) to the sacral wound  4. Press a Mepilex  Border Dressing (#[id number]) to the upper back and Mepilex  Sacral Dressing (PS#[id number]) to the sacral wound- making sure to conform nicely to skin curvatures   (begin placing the Mepilex at the most distal aspect first, smooth into place in an upward direction, then smooth side to side)   5. Time and date dressing change and isabela with a \"T\" for treatment of a wound  6. Reposition pt every 1 to 2 hours when in bed and hourly when up to the chair to relieve pressure and promote perfusion to tissue.  Position pt only side to side in " bed  NOTE:  Iodosorb Gel should not be used longer than 14 days. After 14 days the gel should be stopped and a new plan established, this may mean only a simple, gauze dressing.  The Iodosorb Gel should be stopped after use on June 2, 2019.       [facility] Nurse will return: weekly and prn

## 2019-05-20 NOTE — PLAN OF CARE
Discharge Planner OT   Patient plan for discharge: Not stated  Current status: Pt completed SLUMS with a score of 10/30 indicated a dementia level cognitive decline. Pt's family reports difficulties with cognition over the past 4 mos.   Barriers to return to prior living situation: Current level of assist, weakness, impaired cognition, lives alone  Recommendations for discharge: TCU   Rationale for recommendations: Pt. would benefit from continued skilled OT intervention to maximize safety/indep. W/ ADL's/fx.transfers, for eventual discharge to least restrictive environment (pending pt./family goals of care).         Entered by: Lakesha Vásquez 05/20/2019 12:26 PM

## 2019-05-20 NOTE — PLAN OF CARE
Discharge Planner PT   Patient plan for discharge: rehab  Current status: Pt in bed upon PT arrival. Recently returned from [place] tube placement, alert but fatigued. Pt declined any EOB or OOB mobility due to fatigue but eager to participate with assisted supine LE exercises.  Barriers to return to prior living situation: lives alone, level of assist, up with 2 for transfers, pain, weakness, fall risk  Recommendations for discharge: TCU  Rationale for recommendations: Pt would benefit from PT at TCU to progress strength, balance and mobility so pt can return home safely. If activity tolerance improves, pt may be a candidate for ARU.       Entered by: Che Jeffery 05/20/2019 4:31 PM

## 2019-05-20 NOTE — PLAN OF CARE
A/O x2, disoriented to place and situation, reorientation provided. AVSS on RA except HTN. Tele 100% v-paved. Neuro intact except L hemiparesis and slight facial droop. Denies pain. Dressing on coccyx and back CDI. Skin jorge a and ecchymosis. Frequent voiding using bedside urinal, incontinent of bladder at times. T/R q2h. Fail video swallow yesterday, placed NPO, possible NG placement today. BG q4h 91, 71. PIV infusing LR 100ml/hr. Discharge pending clinical course, will continue to monitor.

## 2019-05-20 NOTE — CONSULTS
"CLINICAL NUTRITION SERVICES  -  ASSESSMENT NOTE      Recommendations Ordered by Registered Dietitian (RD): Isosource 1.5 at 60 mL/hr to provide:  2160 kcal (29 kcal/kg), 98 g protein (1.3 g/kg), 253 g CHO, 22 g fiber, 1094 mL H2O   Flushes 60 mL every 4 hours   Malnutrition:   % Weight Loss:  > 2% in 1 week (severe malnutrition)  % Intake:  </= 50% for >/= 5 days (severe malnutrition)  Subcutaneous Fat Loss:  None observed  Muscle Loss:  Temporal region mild depletion and Clavicle bone region mild depletion  Fluid Retention:  None noted    Malnutrition Diagnosis: Severe malnutrition  In Context of:  Acute illness or injury  Environmental or social circumstances        REASON FOR ASSESSMENT  Jasyon Leija is a 89 year old male seen by Registered Dietitian for Provider Order - Registered Dietitian to Assess and Order TF per Medical Nutrition Therapy Protocol      NUTRITION HISTORY  - Information obtained from patient - question accuracy as he seemed somewhat confused by the end of our conversation.  He lives alone and tells me that he follows a \"low salt and low sugar diet\".  Patient added that he \"really likes fruit\" and does take Ensure on occasion at home.  He reports that he last remembers eating breakfast 4 days prior to admit (was found down).  Of note, he also tells me today that his jaw feels like it's \"off\" and would like that addressed in the hospital as well.        CURRENT NUTRITION ORDERS  Diet Order:     NPO day #4  Patient therefore day #8 inadequate nutrition taking into account inability to eat x 4 days prior to admit     Current Intake/Tolerance:  N/A      NUTRITION FOCUSED PHYSICAL ASSESSMENT (NFPA)  Completed:  Yes Partial assessment - Upper          Observed:    Muscle wasting (refer to documentation in Malnutrition section)    Obtained from Chart/Interdisciplinary Team:  Pressure Injury - Buttocks, WOCN eval pending    ANTHROPOMETRICS  Height: 6' 0\"  Weight: 74.6 kg (164#)(5/17)  Body mass " index is 22.31 kg/m   Weight Status:  Normal BMI  IBW: 80.9 kg   % IBW: 92%  Weight History: Patient tell me that his weight was about 173# a week ago (down 9# or 5% over the last week)    LABS  Labs reviewed    MEDICATIONS   mL/hr      ASSESSED NUTRITION NEEDS PER APPROVED PRACTICE GUIDELINES:    Dosing Weight 74.6 kg   Estimated Energy Needs: 1928-9794 kcals (25-30 Kcal/Kg)  Justification: maintenance  Estimated Protein Needs:  grams protein (1.2-1.5 g pro/Kg)  Justification: hypercatabolism with acute illness  Estimated Fluid Needs: 6658-9228 mL (1 mL/Kcal)  Justification: maintenance    MALNUTRITION:  % Weight Loss:  > 2% in 1 week (severe malnutrition)  % Intake:  </= 50% for >/= 5 days (severe malnutrition)  Subcutaneous Fat Loss:  None observed  Muscle Loss:  Temporal region mild depletion and Clavicle bone region mild depletion  Fluid Retention:  None noted    Malnutrition Diagnosis: Severe malnutrition  In Context of:  Acute illness or injury  Environmental or social circumstances    NUTRITION DIAGNOSIS:  Inadequate protein-energy intake related to NPO, plan for FT placement today as evidenced by meeting 0% needs       NUTRITION INTERVENTIONS  Recommendations / Nutrition Prescription  Isosource 1.5 at 60 mL/hr to provide:  2160 kcal (29 kcal/kg), 98 g protein (1.3 g/kg), 253 g CHO, 22 g fiber, 1094 mL H2O   Flushes 60 mL every 4 hours    Implementation  Nutrition education: Not appropriate at this time due to patient condition  EN Composition, EN Schedule and Feeding Tube Flush:  Orders written to begin TF at 15 mL/hr and increase every 12 hours by 15 mL to goal.  Flushes as above.    Nutrition Goals  Isosource 1.5 at 60 mL/hr will meet % needs     MONITORING AND EVALUATION:  Progress towards goals will be monitored and evaluated per protocol and Practice Guidelines    Cheryl Hennessy RD, LD, CNSC   Clinical Dietitian - [facility]

## 2019-05-20 NOTE — PROGRESS NOTES
[facility]      Stroke-NCC Progress Note    Summary  Jayson Leija is a 89 year old male with Afib (on Coumadin) CKD, DMII, HTN, who was found down by his daughter with left-sided weakness, likely down for up to 2 days, and presented to [facility] 5/17/2019  where he was found to have an acute on chronic right subdural causing compression, mass effect, and subfalcine herniation.  He received KCentra (INR 2.5) and mannitol and was transferredt o [facility].  Repeat INR 1.21    5/17:  he is sleeping, but when woken he is attentive and making jokes.    5/18: more drowsy in the morning but still able to follow commands, later exam improved, pt able to participate w therapies and sit at EOB, etc. Discussed with family, they would not elect to do craniotomy if pt were to decompensate. We indicated that respiratory compromise may be a sequelae of this, and thus, subsequent intubation would not be in line with goals of care. Family members indicate wishes to be DNR/DNI. Will continue with full medical management.    5/19: definitely awake and alert but does have LUE weakness (same as day prior) and somewhat more apparent LLE weakness. Failed video swallow study.     Impression  - Acute on chronic SDH in the setting of coumadin use, potentially traumatic but unclear.  - Rhabdomyolysis, improved    Recommendations  - q4hr neuro checks  - SBP<180  - Mannitol bolus if patient decompensates and stat head CT  - OK to transfer to floors  - PT/OT/SLP  - no antiplatelets/anticoagulation  - continue SLP, but pt may potentially need PEG or at least NG in the interim.       Please contact the Stroke Service-NCC with any questions.    Mckenzie Pro MD  Stroke fellow      Seen and discussed with Dr. Yu.  __________________________________________________    Medications   Current Facility-Administered Medications   Medication     acetaminophen (TYLENOL) tablet 650 mg    Or     acetaminophen (TYLENOL) solution 650  mg     acetaminophen (TYLENOL) Suppository 650 mg     amLODIPine (NORVASC) tablet 2.5 mg     glucose gel 15-30 g    Or     dextrose 50 % injection 25-50 mL    Or     glucagon injection 1 mg     hydrALAZINE (APRESOLINE) injection 10-20 mg     HYDROmorphone (PF) (DILAUDID) injection 0.3-0.5 mg     insulin aspart (NovoLOG) inj (RAPID ACTING)     labetalol (NORMODYNE/TRANDATE) syringe 10-20 mg     lactated ringers infusion     levothyroxine (SYNTHROID/LEVOTHROID) tablet 100 mcg     naloxone (NARCAN) injection 0.1-0.4 mg     ondansetron (ZOFRAN-ODT) ODT tab 4 mg    Or     ondansetron (ZOFRAN) injection 4 mg     oxyCODONE (ROXICODONE) tablet 5-10 mg     polyethylene glycol (MIRALAX/GLYCOLAX) Packet 17 g     pravastatin (PRAVACHOL) tablet 20 mg     prochlorperazine (COMPAZINE) injection 5 mg    Or     prochlorperazine (COMPAZINE) tablet 5 mg    Or     prochlorperazine (COMPAZINE) Suppository 12.5 mg     senna-docusate (SENOKOT-S/PERICOLACE) 8.6-50 MG per tablet 1 tablet    Or     senna-docusate (SENOKOT-S/PERICOLACE) 8.6-50 MG per tablet 2 tablet       Medications Prior to Admission   Medication Sig Dispense Refill Last Dose     amLODIPine (NORVASC) 2.5 MG tablet Take 2.5 mg by mouth daily    Unknown     Calcium Carbonate-Vitamin D (CALCIUM 500 + D) 500-125 MG-UNIT TABS Take 2 tablets by mouth every 24 hours   Unknown     fish oil-omega-3 fatty acids 1000 MG capsule Take 1 capsule by mouth every 24 hours   Unknown     levothyroxine (SYNTHROID/LEVOTHROID) 100 MCG tablet Take 100 mcg by mouth daily    Unknown     pravastatin (PRAVACHOL) 20 MG tablet Take 20 mg by mouth daily    Unknown     warfarin (COUMADIN) 3 MG tablet Take 3-4.5 mg by mouth daily    Unknown          PHYSICAL EXAMINATION  Temp:  [96.9  F (36.1  C)-97.8  F (36.6  C)] 97.8  F (36.6  C)  Pulse:  [69-79] 70  Heart Rate:  [69-76] 69  Resp:  [7-25] 18  BP: (151-169)/(55-78) 160/71  SpO2:  [94 %-98 %] 98 %   Neuro:  Mental status: awake, alert, pleasant, up in  chair  Cranial nerves: EOMI, visual fields mild left facial droop, no dysarthria, hearing intact to loud conversation.  Motor: 5/5 strength on the right, 4/5 on left w moderate pronator drift. LLE is 2/5, not antigravity, is able to wigge toes and move leg somewhat more proximally but is not antigravity.  Sensory: Intact to light touch in face, arms, and legs, symmetric  Coordination: no ataxia  Gait: Deferred      Labs/Imaging  Labs and imaging were reviewed and used in developing plan; pertinent results included.  Data   CBC  WBC (10e9/L)   Date Value   05/19/2019 9.8   05/18/2019 14.5 (H)   05/17/2019 18.9 (H)    RBC Count (10e12/L)   Date Value   05/19/2019 3.79 (L)   05/18/2019 3.92 (L)   05/17/2019 4.98    Hemoglobin (g/dL)   Date Value   05/19/2019 11.7 (L)   05/18/2019 12.0 (L)   05/17/2019 15.4      Hematocrit (%)   Date Value   05/19/2019 33.8 (L)   05/18/2019 34.6 (L)   05/17/2019 44.9    Platelet Count (10e9/L)   Date Value   05/19/2019 129 (L)   05/18/2019 133 (L)   05/17/2019 207         BMP  Sodium (mmol/L)   Date Value   05/19/2019 151 (H)   05/18/2019 141   05/17/2019 136    Potassium (mmol/L)   Date Value   05/19/2019 3.8   05/18/2019 4.1   05/17/2019 5.3    Chloride (mmol/L)   Date Value   05/19/2019 121 (H)   05/18/2019 112 (H)   05/17/2019 103      Carbon Dioxide (mmol/L)   Date Value   05/19/2019 18 (L)   05/18/2019 19 (L)   05/17/2019 22    Glucose (mg/dL)   Date Value   05/19/2019 135 (H)   05/18/2019 303 (H)   05/17/2019 389 (H)    Urea Nitrogen (mg/dL)   Date Value   05/19/2019 91 (H)   05/18/2019 81 (H)   05/17/2019 69 (H)      Creatinine (mg/dL)   Date Value   05/19/2019 3.13 (H)   05/18/2019 2.79 (H)   05/17/2019 2.10 (H)    Calcium (mg/dL)   Date Value   05/19/2019 8.4 (L)   05/18/2019 8.0 (L)   05/17/2019 8.7         INR Troponin I A1C   INR (no units)   Date Value   05/18/2019 1.21 (H)   05/17/2019 1.21 (H)   05/17/2019 2.48 (H)    No results found for: TROPI Hemoglobin A1C (%)   Date  Value   05/18/2019 8.5 (H)   05/17/2019 7.9 (H)        Liver Panel  Protein Total (g/dL)   Date Value   05/18/2019 6.0 (L)   05/17/2019 8.0    Albumin (g/dL)   Date Value   05/18/2019 2.7 (L)   05/17/2019 3.2 (L)    Bilirubin Total (mg/dL)   Date Value   05/18/2019 2.9 (H)   05/17/2019 3.4 (H)      Alkaline Phosphatase (U/L)   Date Value   05/18/2019 64   05/17/2019 82    AST (U/L)   Date Value   05/18/2019 38   05/17/2019 93 (H)    ALT (U/L)   Date Value   05/18/2019 31   05/17/2019 44      No results found for: BILIDIRECT       Lipid Profile  No results found for: CHOL No results found for: HDL No results found for: LDL   No results found for: TRIG No results found for: CHOLHDLRATIO

## 2019-05-20 NOTE — PROGRESS NOTES
RADIOLOGY PROCEDURE NOTE  Patient name: Jayson Leija  MRN: [id number]  : 1929    Pre-procedure diagnosis: Failed swallow study  Post-procedure diagnosis: Same    Procedure Date/Time: May 20, 2019  2:25 PM  Procedure: NJ tube placement  Estimated blood loss: None  Specimen(s) collected with description: none  The patient tolerated the procedure well with no immediate complications.  Significant findings:none    88 cm red isabela.. Ready for use.    See imaging dictation for procedural details.    Provider name: Ronnie Valiente  Assistant(s):None

## 2019-05-20 NOTE — PLAN OF CARE
Discharge Planner SLP   Patient plan for discharge: Did not state  Current status: Swallow Tx was provided this am after Epic review/RN consult.  Note plans for TF placement this pm.  Pt demonstrated decreased recall of video swallow study details.  Education was provided regarding high aspiration risk for po intake per video swallow study results and POC for TF per outcome of MD/daughter discussion.  Pt completed 1-5 reps of exercises given mod-max cues.  Incomplete swallows were noted after swabbing trials.  Patient continues to present with high risk for aspiration for po intake at this time.  Recommend NPO status with frequent oral cares.  Plan to continue swallow Tx daily with exercises as able.  Barriers to return to prior living situation: Level of assist, decreased recall   Recommendations for discharge: ARU for intensive swallow Tx and if other needs are present  Rationale for recommendations: Good participation in Tx, motivated for po intake; Swallow Tx to maximize function for safe return to po intake       Entered by: Maria Isabel Enrique 05/20/2019 10:35 AM

## 2019-05-20 NOTE — PROGRESS NOTES
SW:    D: consult acknowledged but not complete. Waiting on palliative consult to take place.   P: SW to continue to follow.    FINA Johnson

## 2019-05-20 NOTE — PROVIDER NOTIFICATION
MD Notification    Notified Person: MD    Notified Person Name: Johanne Alvarez    Notification Date/Time: 5/19/19 2030    Notification Interaction: Spoke with PA    Purpose of Notification: Pt with 66 blood sugar,treating with IV dextrose 25g.   has 16 units of Lantus ordered at HS.  IVF LR at 100.  NPO     Orders Received: Lantus to be discontinued.  Keep IVF the same.  Continue to monitor BG q4h as ordered with sliding scale insulin.    Comments:

## 2019-05-20 NOTE — PROVIDER NOTIFICATION
"Text to MD    \" Family stated pt was seeing cockroaches and animals on the ceiling.  I sat him up and reorientated him, improved.  VSS.  Neuros unchanged.\"  "

## 2019-05-20 NOTE — PROGRESS NOTES
[facility]    Hospitalist Progress Note    Date of Service (when I saw the patient): 05/20/2019    Assessment & Plan   Jayson Leija is a 89 year old male with hx of chronic a-fib/complete heart block s/p PPM on chronic anticoagulation with warfarin who initially presented to [facility] on 5/17/2019 after being found down at home. He was found to have a large right-sided subdural hematoma, and was transferred to [facility] for further evaluation and management    Acute on chronic large right-sided traumatic SDH with cerebral edema following unwitnessed fall  Found down at home by daughter. CT head on arrival showed large right-sided subdural hematoma felt to represent acute on chronic hemorrhage with associated midline shift and mass effect. In the ED, he was given KCentra, vitamin K, and mannitol. Neurosurgery and neurocritical care were consulted on admission, and the decision was made with the family to manage the bleed medically and avoid surgery. He was initiated on a nicardipine infusion for strict blood pressure control. Serial head CTs have been stable without increase in bleed. Family was initially leaning toward strict comfort cares, but since patient's mental status improved, they would now like to pursue restorative cares (short of surgery). He is DNR/DNI  - Residual left-sided weakness, dysphagia, and cognitive impairment  - Goal normotension. Increase amlodipine from 2.5 to 5 mg daily today, 5/20  - Warfarin has been discontinued indefinitely  - PT/OT/SLP recommending TCU  - Follow up with Neurosurgery in 6 weeks    Suspected dementia with possible delirium  Patient's family reports cognitive decline for several months PTA. He was somnolent and difficult to arouse on admission. His level of consciousness has improved, but he remains disoriented to situation with short term memory. SLUMS 10/30 per OT.   - On delirium protocol. Hold antipsychotics for now unless patient becomes  agitated    Dysphagia due to ICH  He has been evaluated by SLP who has recommended alternative means of nutrition due to observed jocelyne aspiration. Discussed nutrition options with patient and family, and they have decided to pursue tube feeds. Patient will try an NJ tube first, and if he tolerates this (e.g, doesn't pull it out), will likely pursue PEG tube prior to discharge. If he pulls it out and/or decides that does not want a feeding tube, patient and family are open to accepting the risk of aspiration and following the safest possible diet as recommended by SLP. 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 Suspect pre-renal state due to decreased intake. Improved with IV fluids  - Creatinine 2.26, will follow  - On IV fluids as noted above    Mild rhabdomyolysis  CK level on admission 2190, due to prolonged period of being down. Improved appropriately with IV fluids  - Holding PTA pravastatin    Sacral wounds, unstageable  Present on admission. [place] by WOC RN to be multifactorial from fall, friction from scooting, and probable pressure components  - Wound cares in place as per WOC RN    Chronic atrial fibrillation  History of complete heart block s/p PPM  - Warfarin has been discontinued indefinitely    Elevated troponin due to demand ischemia  Troponin 0.15 on arrival. Felt to be due to demand ischemia in setting of acute SDH and from being down.    Essential hypertension  [PTA: amlodipine 2.5 mg daily]  - Amlodipine has been increased as noted above    DM2 without complications  Diet-controlled.  A1c 8.5. Blood sugars were initially elevated to the mid 200-300 range, and he was started on Lantus, but this was discontinued (5/19) due to progressive downward trend in blood sugars.  Recent Labs   Lab 05/20/19  1457 05/20/19  1222 05/20/19  1041 05/20/19  0723 05/20/19  0356 05/20/19  0025 05/19/19  2056  05/19/19  0441  05/18/19  0428  05/17/19  1402   GLC  --   --  121*  --   --   --   --   --  135*  --  303*  --  389*   * 113*  --  80 71 97 130*   < >  --    < >  --    < >  --     < > = values in this interval not displayed.     - May need to resume Lantus as blood sugars increase on tube feeds  - Continue medium SSI alone for now    Hypothyroidism  TSH 2.42 on admission  - Continues on PTA levothyroxine 100 mcg daily    FEN: NPO, tube feeds  DVT Prophylaxis: Pneumatic Compression Devices  Code Status: DNR/DNI    Disposition: Expected discharge to TCU once hypernatremia resolves, he is off IV fluids, and pending plan for nutrition, 2-3 more days    [facility]    Interval History   Visual hallucinations this morning, but calm and redirectable. Denies cp/sob, dizziness/lightheadedness, or nausea. Eager to discharge. Discussed plan for nutrition with patient and family at length.   - NJ tube per IR. Start tube feeds  - Start D5W for 500 ccs then change to 1/2 NS  - Increase amlodipine to 5 mg daily    -Data reviewed today: I reviewed all new labs and imaging results over the last 24 hours. I personally reviewed no images or EKG's today.    Physical Exam   Temp: 97.6  F (36.4  C) Temp src: Oral BP: 150/75 Pulse: 70 Heart Rate: 71 Resp: 16 SpO2: 97 % O2 Device: None (Room air)    Vitals:    05/17/19 1815   Weight: 74.6 kg (164 lb 7.4 oz)     Vital Signs with Ranges  Temp:  [96.9  F (36.1  C)-98  F (36.7  C)] 97.6  F (36.4  C)  Pulse:  [70] 70  Heart Rate:  [67-75] 71  Resp:  [11-18] 16  BP: (150-162)/(69-80) 150/75  SpO2:  [94 %-98 %] 97 %  I/O last 3 completed shifts:  In: 975 [I.V.:975]  Out:  1400 [Urine:1400]    Constitutional: Appears comfortable, NAD  Respiratory: Breathing non-labored. Lungs CTAB - no wheezes, crackles, or rhonchi  Cardiovascular: Heart RRR, no m/r/g. No pedal edema  GI: +BS, abd soft/NT  Skin/Integumen: Ecchymoses over extremities  Neuro: Alert and appropriate, but oriented to self and place only, and very forgetful. SIMS, +left-sided weakness  Psych: Calm and cooperative    Medications     IV fluid REPLACEMENT ONLY       D5W 50 mL/hr at 05/20/19 1110       amLODIPine  2.5 mg Oral Once     [START ON 5/21/2019] amLODIPine  5 mg Oral or Feeding Tube Daily     insulin aspart  1-6 Units Subcutaneous Q4H     [START ON 5/21/2019] levothyroxine  100 mcg Oral or Feeding Tube Daily     Data   Recent Labs   Lab 05/20/19  1041 05/19/19  0441 05/18/19  0428 05/17/19 2006 05/17/19  1442 05/17/19  1402   WBC 9.1 9.8 14.5*  --   --  18.9*   HGB 12.8* 11.7* 12.0*  --   --  15.4   MCV 91 89 88  --   --  90   * 129* 133*  --   --  207   INR  --   --  1.21* 1.21*  --  2.48*   * 151* 141  --   --  136   POTASSIUM 3.6 3.8 4.1  --   --  5.3   CHLORIDE 126* 121* 112*  --   --  103   CO2 23 18* 19*  --   --  22   BUN 64* 91* 81*  --   --  69*   CR 2.26* 3.13* 2.79*  --   --  2.10*   ANIONGAP 7 12 10  --   --  11   [place] 9.1 8.4* 8.0*  --   --  8.7   * 135* 303*  --   --  389*   ALBUMIN  --   --  2.7*  --   --  3.2*   PROTTOTAL  --   --  6.0*  --   --  8.0   BILITOTAL  --   --  2.9*  --   --  3.4*   ALKPHOS  --   --  64  --   --  82   ALT  --   --  31  --   --  44   AST  --   --  38  --   --  93*   TROPONIN  --   --   --   --  0.15*  --        Recent Results (from the past 24 hour(s))   XR Feeding Tube Placement    Narrative    FEEDING TUBE PLACEMENT   5/20/2019 2:24 PM    HISTORY: Failed video swallow.    COMPARISON: None    FINDINGS: 2.8 minutes of fluoroscopy were utilized for placement of a  feeding tube.  At the final position, the feeding tube tip was distal  duodenum.  15 mL of  contrast was injected to confirm placement.  The  tube was marked at the level of the nose at the 88 cm length.   Estimated blood loss during the procedure was less than 5 mL. No  specimens collected. There were no complications of the procedure.    Medications used: 5 mL Lidocaine jelly, 15 mL Omnipaque 240    Images Obtained: 1      Impression    IMPRESSION: Successful feeding tube placement.    CLAYTON HUMPHREY PA-C

## 2019-05-20 NOTE — PLAN OF CARE
A&Ox3-4, confused at times and repeats self.  Reorients easily.  Restless at times.  Denies pain.  VSS.  IVF.  NPO.  NG feeding tube placement tomorrow.  BG 66, 25g IV dextrose given and recheck 130.  Lantus discontinued at HS.  Neuro's exhibit slight L facial droop, LUE hemiparesis, moderate hand , L pronator drift, LLE hemiparesis, weak plantar/dorsi flexion.  Used urinal with assist, can be incontinent.  Total care.  Fall precautions.  Turned q2h, coccyx dressing c/d/I.  Dressing on mid back changed.

## 2019-05-21 NOTE — PLAN OF CARE
Pt here with SDH w/ midline shift. Alert, restless, confused. [place]. Neuros SONYA LLE hemiparesis, slight LUE drift . VSS on RA, SBP within parameters. NG in place, increased to 30cc this AM. Tele Vpaced. NPO diet, oral swabs provided. Pressure and sheer injuries w/ foams, new dressing to mid upper back applied. Bedrest, q2 T/R. Palliative following, discharge to TCU pending, nrsng cont to monitor.

## 2019-05-21 NOTE — PROGRESS NOTES
Brief Palliative Team Note.    Consult received, chart reviewed and discussed with Dr David. Pt is more awake than he had been this weekend, family have created a plan to try tube feeding to allow more time for pt to recover. Palliative Care Consult not needed at this time as goals are clear. I will cancel the consult, please do not hesitate to contact our team if future needs arise. Thank you.    Nati BACH CNP  Pager: [phone]  Palliative Medicine  May 21, 2019

## 2019-05-21 NOTE — PLAN OF CARE
Discharge Planner OT   Patient plan for discharge: Rehab  Current status: Supine>sit mod/max A to bring body upright and get legs over EOB. CGA to maintain sitting balance, otherwise leaning to left. Sit>supine min A to get LLE up into bed. Min/mod A to scoot in bed given mod Vc's for pushing through legs. Pt completed LUE ROM/neuro twin exercises (shoulder flex, elbow flex/ext, wrist flex/ext, cross midline, digit flex/ext, abd/add) at EOB.   Barriers to return to prior living situation: Current level of assist, weakness, impaired cognition, lives alone  Recommendations for discharge: TCU  Rationale for recommendations: Pt will benefit from continued skilled OT to improve strength and ROM and maximize IND in self-care and fx transfers. Pt may be candidate for ARU with improved activity tolerance.        Entered by: Lakesha Vásquez 05/21/2019 10:13 AM

## 2019-05-21 NOTE — PLAN OF CARE
Discharge Planner SLP   Patient plan for discharge: Not stated.   Current status: Patient seen for swallow treatment while upright in the chair. He completed 10 reps each of lingual exercises with decreased strength/ROM. 5 reps of lingual resistance less than 2 second hold. Cough strength is farily strong. He was given 5 small ice chips and completed supra glottic swallow with moderate verbal and visual cues. Delayed weak swallow response and reduced laryngeal elevation. Continues to be a high risk for silent aspiration. Patient has decreased awareness of his swallow deficits. Recommend: 1. Continue NPO with TF and daily speech therapy. Will need a repeat video when warranted given silent aspiration and severe residue.   Barriers to return to prior living situation: Dysphagia  Recommendations for discharge: ARC  Rationale for recommendations: Patient would benefit from intense ST needs for swallowing. He demonstrated good participation and is very motivated to return to oral intake. Anticipate he will be able to tolerate 3 hours of therapy by time of discharge.        Entered by: Ivis Rowell 05/21/2019 11:47 AM

## 2019-05-21 NOTE — PLAN OF CARE
Pt here with large R acute and chronic SDH with mild midline shift. Pt A&O x 2-3, confused to situation and intermittent confusion to place, forgetful. [place]. VSS, on RA. LS clear. Tele 100% V-paced. Denies pain. Denies numbness/tingling, LUE/LLE +1 edema noted, otherwise CMS intact. Neuro's exhibit LUE hemiparesis, 3-4/5, moderate hand , L pronator drift, LLE hemiparesis, 3/5, with weak to mod plantar/dorsi flexion otherwise intact. NPO. TF increase to 45mL/hr at 1730, able increase to goal rate of 60mL/hr at 0530. BG check q4hrs, sliding scale insulin given, see MAR. Oral care given. Incont of urine at times. +BS, passing flatus, BM today per bed pan. New Mepilex dressing applied to Coccyx pressure injury. Mid upper back wound cleansed, new Mepilex applied. Bruising BUE/BLE with multiple abrasions, and scabs. Up to chair with strong A2 with GB and walker, otherwise lift used, T&R q2hrs right and left side d/t pressure injury. Palliative d/rick, family to see how patient tolerates TF. Na+ 154, MD aware increased 60mL free water flushes to q2hrs with IVF going at 75mL/hr, sodium lab redraw schedule for 2000. Recommending TCU at time of discharge.  Nrsg will continue to monitor.

## 2019-05-21 NOTE — PROGRESS NOTES
"SPIRITUAL HEALTH SERVICES Progress Note  [facility] 73    Visit per LOS.  Pt's daughter in law and son at bedside.  Pt expressed discomfort with feeding tube, and that he would like a drink of water.  Pt talked about his wife, [place], who  a year ago. And shared that they were  49 years, and how he misses her.  Pt said that this illness is a surprise, and he is trying to keep a good attitude, \"what else can I do?\"  Pt said he is [employer], and would like prayer.       provided listening, support and prayer.     Pt said he is hoping to be able to get some water soon, and is looking forward to what therapy will say when he sees them today.  Pt is supported by family.    SH will follow, and is available as needs arise, and upon request.      Mary Sierra  Chaplain Resident  "

## 2019-05-21 NOTE — PROGRESS NOTES
[facility]    Hospitalist Progress Note    Assessment & Plan   Jayson Leija is a 89 year old male who was admitted on 5/17/2019.     Jayson Leija is a 89 year old male with hx of chronic a-fib/complete heart block s/p PPM on chronic anticoagulation with warfarin who initially presented to [facility] on 5/17/2019 after being found down at home. He was found to have a large right-sided subdural hematoma, and was transferred to [facility] for further evaluation and management     Acute on chronic large right-sided traumatic SDH with cerebral edema following unwitnessed fall  Found down at home by daughter. CT head on arrival showed large right-sided subdural hematoma felt to represent acute on chronic hemorrhage with associated midline shift and mass effect. In the ED, he was given KCentra, vitamin K, and mannitol. Neurosurgery and neurocritical care were consulted on admission, and the decision was made with the family to manage the bleed medically and avoid surgery. He was initiated on a nicardipine infusion for strict blood pressure control. Serial head CTs have been stable without increase in bleed. Family was initially leaning toward strict comfort cares, but since patient's mental status improved, they would now like to pursue restorative cares (short of surgery). He is DNR/DNI  - Residual left-sided weakness, dysphagia, and cognitive impairment  - Goal normotension  -5/20  Increased amlodipine from 2.5 to 5  -BP still in the 150's, will give one time dose of 2.5 mg today  -may need to increase amlodipine tomorrow, but as just inc on 5/20, will not increase tomorrow's dose for now, re-eval in am.  - Warfarin has been discontinued indefinitely  - PT/OT/SLP recommending TCU  - Follow up with Neurosurgery in 6 weeks     Suspected dementia with possible delirium  Patient's family reports cognitive decline for several months PTA. He was somnolent and difficult to arouse on admission. His  level of consciousness has improved, but he remains disoriented to situation with short term memory. SLUMS 10/30 per OT.   - On delirium protocol. Hold antipsychotics for now unless patient becomes agitated     Dysphagia due to ICH  He has been evaluated by SLP who has recommended alternative means of nutrition due to observed jocelyne aspiration. Discussed nutrition options with patient and family, and they have decided to pursue tube feeds. Patient will try an NJ tube first, and if he tolerates this (e.g, doesn't pull it out), will likely pursue PEG tube prior to discharge. If he pulls it out and/or decides that does not want a feeding tube, patient and family are open to accepting the risk of aspiration and following the safest possible diet as recommended by SLP. 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 Suspect pre-renal state due to decreased intake. Improved with IV fluids  - Creatinine 2.15<2.26, will follow  - On IV fluids as noted above     Mild rhabdomyolysis  CK level on admission 2190, due to prolonged period of being down. Improved appropriately with IV fluids  - Holding PTA pravastatin     Sacral wounds, unstageable  Present on admission. [place] by WOC RN to be multifactorial from fall, friction from scooting, and probable pressure components  - Wound cares in place as per WOC RN     Chronic atrial  fibrillation  History of complete heart block s/p PPM  - Warfarin has been discontinued indefinitely     Elevated troponin due to demand ischemia  Troponin 0.15 on arrival. Felt to be due to demand ischemia in setting of acute SDH and from being down.     Essential hypertension  [PTA: amlodipine 2.5 mg daily]  - Amlodipine has been increased as noted above     DM2 without complications  Diet-controlled. A1c 8.5. Blood sugars were initially elevated to the mid 200-300 range, and he was started on Lantus, but this was discontinued (5/19) due to progressive downward trend in blood sugars.  -now on tube feeds, and glucoses in the mid 200's  - will add lantus 8 units daily today, plus sliding scale     Hypothyroidism  TSH 2.42 on admission  - Continues on PTA levothyroxine 100 mcg daily     FEN: NPO, tube feeds  DVT Prophylaxis: Pneumatic Compression Devices  Code Status: DNR/DNI     Disposition: Expected discharge to TCU once hypernatremia resolves, he is off IV fluids, and pending plan for nutrition, 2-3 more days                 # Pain Assessment:  Current Pain Score 5/21/2019   Patient currently in pain? denies       Text Page (7am - 6pm)  Kim David MD    Interval History pt says he feels dry    -Data reviewed today: I reviewed all new labs and imaging results over the last 24 hours. I personally reviewed no images or EKG's today.    Physical Exam   Temp: 97.4  F (36.3  C) Temp src: Oral BP: 152/75   Heart Rate: 73 Resp: 18 SpO2: 98 % O2 Device: None (Room air)    Vitals:    05/17/19 1815   Weight: 74.6 kg (164 lb 7.4 oz)     Vital Signs with Ranges  Temp:  [97.4  F (36.3  C)-98.2  F (36.8  C)] 97.4  F (36.3  C)  Heart Rate:  [67-73] 73  Resp:  [16-18] 18  BP: (150-165)/(65-82) 152/75  SpO2:  [93 %-99 %] 98 %  I/O last 3 completed shifts:  In: 719 [I.V.:539; NG/GT:180]  Out: 700 [Urine:700]    Constitutional: alert   Respiratory: clear to auscultation, no wheezing or rhonchi   Cardiovascular: regular rate  and rhythm without murmer or rub   GI:positive bowel sounds, non-tender   Skin/Integumen: no rashes    Other:        Medications     IV fluid REPLACEMENT ONLY       NaCl 75 mL/hr at 05/20/19 1553       amLODIPine  5 mg Oral or Feeding Tube Daily     insulin aspart  1-6 Units Subcutaneous Q4H     levothyroxine  100 mcg Oral or Feeding Tube Daily       Data   Recent Labs   Lab 05/21/19  0812 05/20/19  1041 05/19/19  0441 05/18/19  0428 05/17/19 2006 05/17/19  1442 05/17/19  1402   WBC 8.5 9.1 9.8 14.5*  --   --  18.9*   HGB 12.8* 12.8* 11.7* 12.0*  --   --  15.4   MCV 92 91 89 88  --   --  90   * 142* 129* 133*  --   --  207   INR  --   --   --  1.21* 1.21*  --  2.48*   * 156* 151* 141  --   --  136   POTASSIUM 3.6 3.6 3.8 4.1  --   --  5.3   CHLORIDE 124* 126* 121* 112*  --   --  103   CO2 22 23 18* 19*  --   --  22   BUN 62* 64* 91* 81*  --   --  69*   CR 2.15* 2.26* 3.13* 2.79*  --   --  2.10*   ANIONGAP 8 7 12 10  --   --  11   INDIANA 8.9 9.1 8.4* 8.0*  --   --  8.7   * 121* 135* 303*  --   --  389*   ALBUMIN  --   --   --  2.7*  --   --  3.2*   PROTTOTAL  --   --   --  6.0*  --   --  8.0   BILITOTAL  --   --   --  2.9*  --   --  3.4*   ALKPHOS  --   --   --  64  --   --  82   ALT  --   --   --  31  --   --  44   AST  --   --   --  38  --   --  93*   TROPONIN  --   --   --   --   --  0.15*  --        Recent Results (from the past 24 hour(s))   XR Feeding Tube Placement    Narrative    FEEDING TUBE PLACEMENT   5/20/2019 2:24 PM    HISTORY: Failed video swallow.    COMPARISON: None    FINDINGS: 2.8 minutes of fluoroscopy were utilized for placement of a  feeding tube.  At the final position, the feeding tube tip was distal  duodenum.  15 mL of contrast was injected to confirm placement.  The  tube was marked at the level of the nose at the 88 cm length.   Estimated blood loss during the procedure was less than 5 mL. No  specimens collected. There were no complications of the  procedure.    Medications used: 5 mL Lidocaine jelly, 15 mL Omnipaque 240    Images Obtained: 1      Impression    IMPRESSION: Successful feeding tube placement.    CLAYTON HUMPHREY PA-C

## 2019-05-21 NOTE — CONSULTS
Care Transition Initial Assessment - SW     Met with: PATIENT   Active Problems:    Subdural hematoma (H)       DATA  Lives With: alone   Living Arrangements: house  Quality of Family Relationships: supportive, involved  Description of Support System: Supportive, Involved  Who is your support system?: Children  Support Assessment: Adequate family and caregiver support.   Identified issues/concerns regarding health management: SW following for discharge needs  Quality of Family Relationships: supportive, involved  Transportation Anticipated: car, drives self    ASSESSMENT  Cognitive Status: alert, restless, confused per RN note.  Concerns to be addressed: Patient is a 89 year old male who was admitted to the hospital for a subdural hematoma. Prior to hospitalization patient was living at home alone. Sw spoke with patient's daughter to discuss discharge placement needs. Patient has [employer] insurance and will require prior authorization. Daughter would like referrals sent to [facility]. SW will send referrals and update daughter once assessments are complete.      PLAN  Financial costs for the patient includes   Patient given options and choices for discharge to TCU  Patient/family is agreeable to the plan?  YES  Patient Goals and Preferences:  [facility]  Patient anticipates discharging to:  TCU    FINA Georges   *[phone]

## 2019-05-21 NOTE — PLAN OF CARE
Discharge Planner PT   Patient plan for discharge: did not state  Current status: Pt was in bed upon PT arrival, alert and cooperative. Pt require heavy modA for supine>sit with assist for moving LEs and elevating his trunk. Able to sit at EOB with Caterina for balance needing assist to correct posterior and L lateral lean. Sit<>stand from bed to FWW and bed>chair with modA of 2 for balance, walker management and assist needed to move his L foot with each step.  Barriers to return to prior living situation: lives alone, level of assist required, weakness, fall risk  Recommendations for discharge: TCU  Rationale for recommendations: Pt would benefit from PT at TCU to progress strength, balance and mobility so pt can return home safely. Pt may need ultimately need increased assist at home.       Entered by: Che Jeffery 05/21/2019 11:56 AM

## 2019-05-22 NOTE — PROGRESS NOTES
HOSPITALIST SERVICE CROSS-COVER NOTE:    Called by RN for 14 beats of V. tach.  The most recent echocardiogram on April 25, 2019 has shown LV ejection fraction of 55%.  The patient has chronic atrial fibrillation as well as a pacemaker.  A BMP is already ordered for a.m.  I will add magnesium level.  I will start the patient on metoprolol 12.5 mg twice daily per feeding tube.  I will keep the patient on telemetry.  Further management will be deferred to the rounding hospitalist.      Anselmo Carrillo MD  Hospitalist  Pager # [phone]

## 2019-05-22 NOTE — PROGRESS NOTES
[facility]    Hospitalist Progress Note    Date of Service (when I saw the patient): 05/22/2019    Assessment & Plan   Jayson Leija is a 89 year old male with hx of chronic a-fib/complete heart block s/p PPM on chronic anticoagulation with warfarin who initially presented to [facility] on 5/17/2019 after being found down at home. He was found to have a large right-sided subdural hematoma, and was transferred to [facility] for further evaluation and management     Acute on chronic large right-sided traumatic SDH with cerebral edema following unwitnessed fall  Found down at home by daughter. CT head on arrival showed large right-sided subdural hematoma felt to represent acute on chronic hemorrhage with associated midline shift and mass effect. In the ED, he was given KCentra, vitamin K, and mannitol. Neurosurgery and neurocritical care were consulted on admission, and the decision was made with the family to manage the bleed medically and avoid surgery. He was initiated on a nicardipine infusion for strict blood pressure control. Serial head CTs have been stable without increase in bleed. Family was initially leaning toward strict comfort cares, but since patient's mental status improved, they would now like to pursue restorative cares (short of surgery). He is DNR/DNI  - Residual left-sided weakness, dysphagia, and cognitive impairment  - Goal normotension  - 5/20 increased amlodipine from 2.5 to 5  - Warfarin has been discontinued indefinitely  - PT/OT/SLP recommending TCU  - Follow up with Neurosurgery in 6 weeks     Suspected dementia with possible delirium  Patient's family reports cognitive decline for several months PTA. He was somnolent and difficult to arouse on admission. His level of consciousness has improved, but he remains disoriented to situation with short term memory. SLUMS 10/30 per OT.   - On delirium protocol. Hold antipsychotics for now unless patient becomes  agitated     Dysphagia due to ICH  He has been evaluated by SLP who has recommended alternative means of nutrition due to observed jocelyne aspiration. Discussed nutrition options with patient and family, and they have decided to pursue tube feeds. Patient will try an NJ tube first, and if he tolerates this (e.g, doesn't pull it out), will likely pursue PEG tube prior to discharge. If he pulls it out and/or decides that does not want a feeding tube, patient and family are open to accepting the risk of aspiration and following the safest possible diet as recommended by SLP. 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 Suspect pre-renal state due to decreased intake. Improved with IV fluids  - Cr now at baseline  - On IV fluids as noted above     Mild rhabdomyolysis  CK level on admission 2190, due to prolonged period of being down. Improved appropriately with IV fluids  - Holding PTA pravastatin     Sacral wounds, unstageable  Present on admission. [place] by WOC RN to be multifactorial from fall, friction from scooting, and probable pressure components  - Wound cares in place as per WOC RN     Chronic atrial fibrillation  History of complete heart block s/p PPM  - Warfarin has been discontinued indefinitely     NSTEMI type 2 due to demand ischemia  Nonsustained ventricular tachycardia  Troponin 0.15 on arrival. Felt to be due to demand ischemia in setting of acute SDH and from being  down.  - 14 beat run early AM on 5/22  - Electrolytes unremarkable  - Started on metoprolol 12.5 mg bid     Essential hypertension  [PTA: amlodipine 2.5 mg daily]  - Amlodipine has been increased as noted above     DM2 without complications  Diet-controlled. A1c 8.5. Blood sugars were initially elevated to the mid 200-300 range, and he was started on Lantus, but this was discontinued (5/19) due to progressive downward trend in blood sugars.  - severe uncontrolled hyperglycemia to mid 200's to 300'  - will increase to lantus 14 units daily today, plus sliding scale     Hypothyroidism  TSH 2.42 on admission  - Continues on PTA levothyroxine 100 mcg daily     FEN: NPO, tube feeds  DVT Prophylaxis: Pneumatic Compression Devices  Code Status: DNR/DNI     Disposition: Expected discharge to TCU once hypernatremia resolves, he is off IV fluids, and pending plan for nutrition, 2-3 more days    Nikunj Connolly  Text Page (7 am to 6 pm)    Interval History   The patient is resting comfortably in bed.  He has no acute complaints.    -Data reviewed today: I reviewed all new labs and imaging results over the last 24 hours. I personally reviewed no images or EKG's today.    Physical Exam   Temp: 97.5  F (36.4  C) Temp src: Oral BP: 125/68   Heart Rate: 69 Resp: 16 SpO2: 97 % O2 Device: None (Room air)    Vitals:    05/17/19 1815 05/22/19 0400   Weight: 74.6 kg (164 lb 7.4 oz) 75.3 kg (166 lb)     Vital Signs with Ranges  Temp:  [97.5  F (36.4  C)-98.2  F (36.8  C)] 97.5  F (36.4  C)  Heart Rate:  [69-71] 69  Resp:  [16] 16  BP: (125-157)/(68-76) 125/68  SpO2:  [92 %-100 %] 97 %  I/O last 3 completed shifts:  In: 1988 [I.V.:1658; NG/GT:330]  Out: 1025 [Urine:1025]    Gen: Well nourished, well developed, alert and oriented x 3, no acute distressed  HEENT: Atraumatic, normocephalic  Lungs: Clear to ausculation without wheezes, rhonchi, or rales  Heart: Regular rate and rhythm, no gallops or rubs, no murmurs  GI: Bowel sound  normal, no hepatosplenomegaly or masses  Lymph: No lymphadenopathy or edema  Skin: No rashes     Medications     IV fluid REPLACEMENT ONLY       D5W 100 mL/hr at 05/22/19 1222       amLODIPine  5 mg Oral or Feeding Tube Daily     insulin aspart  1-6 Units Subcutaneous Q4H     [START ON 5/23/2019] insulin glargine  14 Units Subcutaneous Daily     levothyroxine  100 mcg Oral or Feeding Tube Daily     metoprolol  12.5 mg Per Feeding Tube BID       Data   Recent Labs   Lab 05/22/19  0902 05/21/19 2010 05/21/19 0812 05/20/19  1041 05/19/19  0441 05/18/19  0428 05/17/19 2006 05/17/19  1442 05/17/19  1402   WBC  --   --  8.5 9.1 9.8 14.5*  --   --  18.9*   HGB  --   --  12.8* 12.8* 11.7* 12.0*  --   --  15.4   MCV  --   --  92 91 89 88  --   --  90   PLT  --   --  141* 142* 129* 133*  --   --  207   INR  --   --   --   --   --  1.21* 1.21*  --  2.48*   * 155* 154* 156* 151* 141  --   --  136   POTASSIUM 3.6  --  3.6 3.6 3.8 4.1  --   --  5.3   CHLORIDE 122*  --  124* 126* 121* 112*  --   --  103   CO2 23  --  22 23 18* 19*  --   --  22   BUN 50*  --  62* 64* 91* 81*  --   --  69*   CR 2.11*  --  2.15* 2.26* 3.13* 2.79*  --   --  2.10*   ANIONGAP 7  --  8 7 12 10  --   --  11   INDIANA 8.5  --  8.9 9.1 8.4* 8.0*  --   --  8.7   *  --  264* 121* 135* 303*  --   --  389*   ALBUMIN  --   --   --   --   --  2.7*  --   --  3.2*   PROTTOTAL  --   --   --   --   --  6.0*  --   --  8.0   BILITOTAL  --   --   --   --   --  2.9*  --   --  3.4*   ALKPHOS  --   --   --   --   --  64  --   --  82   ALT  --   --   --   --   --  31  --   --  44   AST  --   --   --   --   --  38  --   --  93*   TROPONIN  --   --   --   --   --   --   --  0.15*  --        No results found for this or any previous visit (from the past 24 hour(s)).

## 2019-05-22 NOTE — PLAN OF CARE
Discharge Planner SLP   Patient plan for discharge: Not addressed.  Current status: Patient seen for swallow treatment while up in the chair. he completed 10 reps each of lingual exerises with improved ROM. 5 rep of lingual resistance exercises wih improved strength. Tolerated 5 small ice chips with a modified supra-glottic swallow. Gave 1 teaspoon of water with overt Sx of aspiration with an immediate cough response. Continues to be a high risk for aspiration. Recommend: 1. Continue NPO with TF and daily ST.  Barriers to return to prior living situation: Acute stroke deficits and cognition.   Recommendations for discharge: [facility]  Rationale for recommendations: Patient previously living independently. Patient has good participation and motivation. Improved endurance to tolerate 3 hours of therapy daily.        Entered by: Ivis Rowell 05/22/2019 10:03 AM

## 2019-05-22 NOTE — PROVIDER NOTIFICATION
MD Notification    Notified Person: MD    Notified Person Name: JEISON Giarrd on call    Notification Date/Time: 5/21 2128    Notification Interaction: Paged on call, spoke with PA over phone     Purpose of Notification: Elevated sodium 155    Orders Received: No new orders, pt is alert and oriented x4, and will have BMP drawn in AM, will continue to monitor.     Comments:

## 2019-05-22 NOTE — PLAN OF CARE
Discharge Planner PT   Patient plan for discharge: home   Current status: Pt requires modA of 1 for supine>sit; modA of 2 for sit<>stand and bed>chair with a FWW. Improved ability to  and move his LLE in standing with step-by-step cues.  Barriers to return to prior living situation: level of assist required, lives alone, fall risk, weakness  Recommendations for discharge: TCU  Rationale for recommendations: Pt would benefit from PT at TCU to progress strength, balance and mobility so pt can return home safely.        Entered by: Che Jeffery 05/22/2019 9:37 AM

## 2019-05-22 NOTE — PROGRESS NOTES
[facility] Progress Note  Chart Reviewed, Pt discussed in Interdisciplinary Rounds.   TCU referrals out.    Intervention:   SW received call from Raymond at [facility] that they have accepted pt clinically for admission.  JUAN MIGUEL spoke with pt and he is interested in the room at [facility] and is agreeable to [facility] going ahead  And seeking authorization for pt's stay. JUAN MIGUEL relayed this message to Raymond at [facility].    Team Members notified:   JOSERN    Plan: Discharge to TCU  Anticipated discharge placement: [facility]  Barriers: insurance authoriization  Follow up plan: JUAN MIGUEL following.    FINA Watson  [facility] Care Transitions  Phone: [phone]

## 2019-05-22 NOTE — PLAN OF CARE
Discharge Planner OT   Patient plan for discharge: Rehab  Current status: pt sitting up in chair upon OT arrival, pt setup and participated with ADLS, pt completed ADLS with min A and cues to incorporate LUE into task. Completed BUE ROM with assist needed for LUE to complete with proper form and to end range. Assist needed with cues to adjust self back to upright sitting as pt leans to right while seated in chair.   Barriers to return to prior living situation: Current level of assist, weakness, impaired cognition, lives alone  Recommendations for discharge: TCU per plan established by the Occupational Therapist  Rationale for recommendations: Pt will benefit from continued skilled OT to improve strength and ROM and maximize IND in self-care and fx transfers. Pt may be candidate for ARU with improved activity tolerance         Entered by: Maritza Barahona 05/22/2019 10:16 AM

## 2019-05-22 NOTE — PLAN OF CARE
Alert, oriented x4 but with intermittent confusion at times. Pleasant. VSS on RA. Denies any pain. L sided hemiparesis, pronator drift, and 3-4/5 strengths in LUE and LLE. NPO, on continuous tube feeding. Increased to goal rate of 60ml/hr at 0530. Denies nausea. Passing gas. Voiding in urinal. Continent. Ax2 with lift. Turn and repo Q2 hours. Tele 100% v paced, had 14 beat run of V tach, MD notified. Mepilex on coccyx and back, CDI. Sodium 155, MD notified and fluids changed. Q4 hour BG checks.

## 2019-05-22 NOTE — PROGRESS NOTES
Paged by RN for ongoing hypernatremia. Chart reviewed. Sodium increased 5/19 to 151. On 5/20, sodium 156, pt had been maintained on LR, but this was changed to D5 which was discontinued ~1600 and switched to 0.45% NS at 75 ccs. Sodium this , recheck at 2000 is 155. Will change IVF back to D5. Continue with increased free water flushes from 60 ml every 4 hours to every 2 hours. Repeat BMP in the AM.

## 2019-05-22 NOTE — PLAN OF CARE
Pt here with large R acute and chronic SDH with mild midline shift. Pt A&O x 2-3, confused to situation and intermittent confusion to place, forgetful. [place]. VSS, on RA. LS clear. Tele 100% V-paced. Denies pain. Denies numbness/tingling, LUE/LLE +1 edema noted, LUE hemiparesis, 4/5, moderate hand , L pronator drift, LLE hemiparesis, 3/5, with  moderate plantar/dorsi flexion otherwise CMS and Neuro's intact. NPO. TF at goal rate 60mL/hr with 60mL free water flushes q2hrs. BG check q4hrs, sliding scale insulin given. Oral care given. Incont of urine at times, otherwise voiding in urinal. +BS, passing flatus, no BM today. Coccyx Mepilex CDI. New dressing applied to upper mid back wound. Bruising BUE/BLE with multiple abrasions, and scabs. Up to chair with strong A2 with GB and walker, otherwise lift used, T&R q2hrs right and left side d/t pressure injury.  Na+ 152 with AM labs, sodium lab redraw at 1800. D5 increased to 100mL/hr. TCU at time of discharge.  Nrsg will continue to monitor.

## 2019-05-23 NOTE — SIGNIFICANT EVENT
Significant Event Note    Time of event: 6:48 AM May 23, 2019    Description of event:    Na 152 -> 150 -> 144 over last 18 hours    Plan:    Stop D5W  Start judicious NS  Continue to follow Na, if drops further, would increase NS     Discussed with: bedside nurse    Nelson Cunningham MD

## 2019-05-23 NOTE — PLAN OF CARE
Discharge Planner OT   Patient plan for discharge: Rehab  Current status: pt sitting up in chair, fatigue, sleeping, pt required cues with assist to initiate and follow through with completing ADL task. Nursing wanting pt back into bed, pt max A of 2 sit to stand into lonnie stedy, pt was not able to come to full upright stand, pt dependent of lonnie stedy to complete chair to bed transfer. Max A to roll to side to smooth out bed pad.  Barriers to return to prior living situation: Current level of assist, weakness, impaired cognition, lives alone  Recommendations for discharge: TCU per plan established by the Occupational Therapist  Rationale for recommendations: Pt will benefit from continued skilled OT to improve strength and ROM and maximize IND in self-care and fx transfers. Pt may be candidate for ARU with improved activity tolerance         Entered by: Maritza Barahona 05/23/2019 10:32 AM

## 2019-05-23 NOTE — PLAN OF CARE
Disoriented to situation, place, time intermittently. Forgetful and [place] VSS on room air. Restless at times. Left pronator drift present. LLE hemiparesis. RUE 5/5 strength, LUE 4/5. Pulses intact. NPO, tube feeding at 60 mL/hr. Free water flushes of 60mL programmed q2h. HOB kept at 30 degrees. Denies nausea. D5 infusing at 100 mL/hr. Tele 100% V-paced. Mepilex on coccyx and over abrasion on back. Incontinent. BG checks Q4H, sliding scale insulin controlled. Sodium 151, now 144 . Continue to monitor

## 2019-05-23 NOTE — PLAN OF CARE
Cálculos renales  (Kidney Stones)  Los cálculos renales (urolitiasis) son masas sólidas que se vidal en el interior de los riñones. El dolor intenso es causado por el movimiento de la shawnee a través del riñón, uréter, vejiga y uretra (tracto urinario). Cuando la shawnee se mueve, el uréter hace un espasmo alrededor de la misma. El cálculo generalmente se elimina con el pis (la orina).  CUIDADOS EN EL HOGAR  · Debe ingerir gran cantidad de líquido para mantener la orina de norberto coreen o color amarillo pálido. Ravenel ayudará a eliminar la shawnee.  · Recoja carter muestra de orina tab 24 horas saeid se lo haya indicado el médico. Tre vez tenga que recoger otra muestra cada 6 o 12 meses.  · Cuele la orina con el colador que le fried provisto. No orine de otra forma que no sea a través del colador, ni siquiera carter vez. Si elimina la shawnee, se retendrá en el colador. Puede ser tan pequeña saeid un grano de sal. Llévela a baker visita con el médico. Ravenel ayudará a que el médico le indique qué puede hacer para tratar de prevenir la ocurrencia de nuevos cálculos renales.  · Sólo tome los medicamentos que le haya indicado baker médico.  · Anna cambios en la dieta diaria saeid se lo haya indicado el médico. Es posible que le indiquen lo siguiente:  ¨ Limitar la cantidad de sal que consume.  ¨ Consumir 5 o más porciones de frutas y verduras por día.  ¨ Limitar la cantidad de carne, carne de ave, pescado y huevos que consume.  · Concurra a todas las visitas de control saeid se lo haya indicado el médico. Ravenel es importante.  · Hágase las radiografías según las indicaciones de baker médico.  SOLICITE AYUDA SI:  Siente un dolor que empeora aún tomando analgésicos.  SOLICITE AYUDA DE INMEDIATO SI:  · El dolor no mejora con los medicamentos recetados.  · Siente escalofríos o fiebre.  · El dolor aumenta y empeora en las siguientes 18 horas.  · Siente un nuevo dolor en el vientre (abdominal).  · Sufre mareos o se desmaya.  · Nota que no puede  Discharge Planner SLP   Patient plan for discharge: Did not state    Current status: Pt alert and requesting ice water. Pt did not recall previous SLP sessions or exercises. Oral cavity appeared clear and moist. Oral cares provided with lightly moistened swab. Used swabs to provide small amounts of ice water to facilitate effortful swallow. Significant delay, lingual pumping, and incomplete hyolayrngeal excursion resulted in overt s/sx of aspiration with wet vocal quality. Pt did follow cues to for supraglottic swallow and pt was able to complete full hyolaryngeal excursion on one trial. Targeted additional pharyngeal exercise and chin tuck against resistance x20 with mod to max cues. Limited neck ROM noted.     Written instructions left on white board to target effortful swallow ?swallow a golf ball? x10 and chin tuck against resistance (rolled up wash cloth under chin and cue pt to push chin into wash cloth as hard as possible, hold for 5 seconds and complete x20 if pt able to tolerate).     Continue NPO with TF; use ice water to complete oral cares.     Barriers to return to prior living situation: Severe dysphagia  Recommendations for discharge: Consider ARU  Rationale for recommendations: Pt was able to tolerate lengthy session with excellent participation. Severe dysphagia with need for alternative nutrition. Continue ST daily for swallowing goals. Repeat VFSS prior to diet initiation - pt not appropriate for repeat VFSS currently due to overt s/sx of aspiration with oral cares/swabs       Entered by: Nidhi Collins 05/23/2019 9:09 AM       orinar.  Esta información no tiene saeid fin reemplazar el consejo del médico. Asegúrese de hacerle al médico cualquier pregunta que tenga.  Document Released: 03/16/2010 Document Revised: 09/07/2016 Document Reviewed: 06/02/2017  [employer] Patient Education © 2017 [employer]  Cólico renal  (Renal Colic)  El cólico renal es un dolor causado por un cálculo en el riñón.  CUIDADOS EN EL HOGAR  · Ragland los medicamentos solamente saeid se lo haya indicado el médico.  · Pregúntele al médico si puede irvin analgésicos de venta marie para aliviar el dolor.  · Maggie suficiente líquido para mantener el pis (orina) coreen o de color amarillo pálido. Maggie entre 6 y 8 vasos de agua por día.  · Consuma menos de 2 gramos de sal por día.  · Coma menos proteínas. Algunos alimentos que contienen proteínas son las edwige, el pescado, los alysia secos y los productos lácteos.  · Intente no comer espinaca, ruibarbo, alysia secos o salvado.  SOLICITE AYUDA SI:  · Tiene fiebre o siente escalofríos.  · La orina tiene mal olor o está turbia.  · Siente dolor o ardor al orinar.  SOLICITE AYUDA DE INMEDIATO SI:  · El dolor al costado (en fosa lumbar) o en la eneida se intensifica repentinamente.  · Está confundido.  · Se desmaya.  Esta información no tiene saeid fin reemplazar el consejo del médico. Asegúrese de hacerle al médico cualquier pregunta que tenga.  Document Released: 03/16/2010 Document Revised: 01/08/2016 Document Reviewed: 10/28/2015  [employer] Patient Education © 2017 [employer]

## 2019-05-23 NOTE — PROGRESS NOTES
CLINICAL NUTRITION SERVICES - REASSESSMENT NOTE      Future/Additional Recommendations:   Once off IVF, recommend H2O flushes of 175 mL q 4 hours for total fluid (TF + flushes) = 2150 mL/day     Malnutrition (5/20):   % Weight Loss:  > 2% in 1 week (severe malnutrition)  % Intake:  </= 50% for >/= 5 days (severe malnutrition)  Subcutaneous Fat Loss:  None observed  Muscle Loss:  Temporal region mild depletion and Clavicle bone region mild depletion  Fluid Retention:  None noted     Malnutrition Diagnosis: Severe malnutrition  In Context of:  Acute illness or injury  Environmental or social circumstances       EVALUATION OF PROGRESS TOWARD GOALS   Diet:  NPO.    Nutrition Support:  Isosource 1.5 at 60 mL/hr to provide:  2160 kcal (29 kcal/kg), 98 g protein (1.3 g/kg), 253 g CHO, 22 g fiber, 1094 mL H2O   Flushes 30 mL every 4 hours per MD order today    Intake/Tolerance:  TF was initiated 5/20 at 15 mL and reached goal rate yesterday AM.  Appears to be tolerating without issues.   Last documented BM was 5/21.  SLP is following.      ASSESSED NUTRITION NEEDS:  Dosing Weight 74.6 kg   Estimated Energy Needs: 0338-9163 kcals (25-30 Kcal/Kg)  Justification: maintenance  Estimated Protein Needs:  grams protein (1.2-1.5 g pro/Kg)  Justification: hypercatabolism with acute illness  Estimated Fluid Needs: 2721-2771 mL (1 mL/Kcal)  Justification: maintenance      NEW FINDINGS:   -342 (HSSI, Lantus 20U)  NS IVF @ 50 mL/hr  Na 144 (WNL)      Previous Goals:   Isosource 1.5 at 60 mL/hr will meet % needs   Evaluation: Met    Previous Nutrition Diagnosis:   Inadequate protein-energy intake related to NPO, plan for FT placement today as evidenced by meeting 0% needs   Evaluation: resolved      CURRENT NUTRITION DIAGNOSIS  No nutrition diagnosis identified at this time     INTERVENTIONS  Recommendations / Nutrition Prescription  Continue current TF  Once off IVF, recommend H2O flushes of 175 mL q 4 hours for total  fluid (TF + flushes) = 2150 mL/day    Implementation  No interventions at this time    Goals  EN will continue to meet assessed needs      MONITORING AND EVALUATION:  Progress towards goals will be monitored and evaluated per protocol and Practice Guidelines      Guillermina Marrero RD  Pager [phone] (M-F)            [phone] (W/E & Hol)

## 2019-05-23 NOTE — PROGRESS NOTES
[facility] Nurse Inpatient Wound Assessment     Follow up Assessment of wound(s) on pt's:   Upper mid back and L of midline sacrum        Data:   Patient History:      per MD note(s): 89 year old male with Afib (on Coumadin) CKD, DMII, HTN, who was found down by his daughter with left-sided weakness.  Based upon his uncollected mail, the family suspects that he fell appoximately 2 days ago.   He presented to the [facility] where he was found to have an acute on chronic right subdural causing compression, mass effect, and subfalcine herniation.  He received KCentra (INR 2.5) and mannitold and was transferredt o [facility].  Repeat INR 1.21     Ben Risk Assessment  Sensory Perception: 3-->slightly limited    Moisture: 3-->occasionally moist   Activity: 2-->chairfast     Mobility: 3-->slightly limited   Nutrition: 3-->adequate   Ben Score: 16    Positioning: Pillows,     Mattress:  Standard , Atmos Air mattress    Moisture Management:  Diaper    Catheter secured? n/a    Current Diet / Nutrition:     None        Labs:   Recent Labs   Lab Test 05/20/19  1041  05/18/19  0428   ALBUMIN  --   --  2.7*   HGB 12.8*   < > 12.0*   INR  --   --  1.21*   WBC 9.1   < > 14.5*   A1C  --   --  8.5*    < > = values in this interval not displayed.       Wound Assessment (location):   Midline upper back and mid-left sacrum  Wound History:  Pt was found down at home, possibly down 2 days  Wound on upper back is a large, smooth looking tissue in the wound bed, slightly moist and red looking, slightly warm.  To the right there are two linear maroon, non blanchable lines of erythema- to serous scabs, each about 5cm long  No pain today.  Midline wound measures about 7.5cm x 3.5cm x 0.2cm.  Looks like pt now has a light rash, flat red leasions on his back, warm, likely a heat rash  To the left of the wound there are two, circular looking, blister like areas, not able to drain, firm feeling with red erythema in  periwound tissue, no pain    05-23-10 mid to right spine                          05-20-19 Midline to right spine           Midline- left sacral area- more defined area of nonblanchable erythema, up to about 4cm x 3cm today (decrease from assessment at beginning of week) and the wound set within this erythema,on the left coccyx is now starting to open slightly  The tissue here is tan, flaccid, moist, 1.5cm x 1.5cm x 0.2cm+.  Tracking down the gluteal cleft, to the base of the coccyx tissue is red, blanchable but peeling and moist    05-23-19 05-20-19 coccyx              Intervention:     Patient's chart evaluated.      Wound(s) assessed with the RN as noted above, sacral Mepilex removed for asesssment    Wound Care: cleaned sites with skin prep, reapplied clean Mepilex dressings with Iodosorb gel    Orders  reviewed    Supplies  reviewed    Discussed plan of care with Patient and Nurse             All patient / family questions answered:  YES          Assessment:          Two wounds on the backside of pt that are likely a mix of trauma from the fall, perhaps friction from scooting and probably pressure components, especially the coccyx wound.  Both wounds clean without s/s of infection.    The sacral/ coccyx wound is now starting to slowly open and breakdown, at the same time the other nonblanchable erythema is resolving so hopefully it will only be a small, deep wound, vs a much larger on.  Will continue to use Iodosorb gel on wounds, again especially the coccyx wound to provide antimicrobial protection, especially if bone is exposed.         Plan:     Nursing to notify the Provider(s) and re-consult the [facility] [employer] Nurse if wound(s) deteriorate(s) or if the wound care plan needs reevaluation.    Plan of care for wound located on upper mid back and sacral wounds: every other day, even days and prn  1. Clean wounds with saline or MicKlenz Spray, pat dry  2. Paint  "with No Sting Skin Prep (#[id number])) and allow to dry thoroughly  3. Apply small dab of Iodosorb Gel (#[id number]) to the sacral wound  4. Press a Mepilex  Border Dressing (#[id number]) to the upper back and Mepilex  Sacral Dressing (PS#[id number]) to the sacral wound- making sure to conform nicely to skin curvatures   (begin placing the Mepilex at the most distal aspect first, smooth into place in an upward direction, then smooth side to side)   5. Time and date dressing change and isabela with a \"T\" for treatment of a wound  6. Reposition pt every 1 to 2 hours when in bed and hourly when up to the chair to relieve pressure and promote perfusion to tissue.  Position pt only side to side in bed  NOTE:  Iodosorb Gel should not be used longer than 14 days. After 14 days the gel should be stopped and a new plan established, this may mean only a simple, gauze dressing.  The Iodosorb Gel should be stopped after use on June 2, 2019.       WO Nurse will return: weekly and prn       "

## 2019-05-23 NOTE — PROGRESS NOTES
[facility]    Hospitalist Progress Note    Date of Service (when I saw the patient): 05/23/2019    Assessment & Plan   Jayson Leija is a 89 year old male with hx of chronic a-fib/complete heart block s/p PPM on chronic anticoagulation with warfarin who initially presented to [facility] on 5/17/2019 after being found down at home. 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 CT head on arrival showed large right-sided subdural hematoma felt to represent acute on chronic hemorrhage with associated midline shift and mass effect. In the ED, he was given KCentra, vitamin K, and mannitol. Neurosurgery and neurocritical care were consulted on admission, and the decision was made with the family to manage the bleed medically and avoid surgery. He was initiated on a nicardipine infusion for strict blood pressure control. Serial head CTs have been stable without increase in bleed. Family was initially leaning toward strict comfort cares, but since patient's mental status improved, they would now like to pursue restorative cares (short of surgery). He is DNR/DNI  - Residual left-sided weakness, dysphagia, and cognitive impairment  - Goal normotension  - 5/20 increased amlodipine from 2.5 to 5  - Warfarin has been discontinued indefinitely  - PT/OT/SLP recommending TCU  - Follow up with Neurosurgery in 6 weeks     Suspected dementia with possible delirium  Patient's family reports cognitive decline for several months PTA. He was somnolent and difficult to arouse on admission. His level of consciousness has improved, but he remains disoriented to situation with short term memory. SLUMS 10/30 per OT.   - On delirium protocol. Hold antipsychotics for now unless patient becomes  agitated     Dysphagia due to ICH  He has been evaluated by SLP who has recommended alternative means of nutrition due to observed jocelyne aspiration. Discussed nutrition options with patient and family, and they have decided to pursue tube feeds. Patient will try an NJ tube first, and if he tolerates this (e.g, doesn't pull it out), will likely pursue PEG tube prior to discharge. If he pulls it out and/or decides that does not want a feeding tube, patient and family are open to accepting the risk of aspiration and following the safest possible diet as recommended by SLP. 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 Suspect pre-renal state due to decreased intake. Improved with IV fluids  - Cr now at baseline  - Recheck in AM     Mild rhabdomyolysis  CK level on admission 2190, due to prolonged period of being down. Improved appropriately with IV fluids  - Holding PTA pravastatin     Sacral wounds, unstageable  Present on admission. [place] by WOC RN to be multifactorial from fall, friction from scooting, and probable pressure components  - Wound cares in place as per WOC RN     Chronic atrial fibrillation  History of complete heart block s/p PPM  - Warfarin has been discontinued indefinitely     NSTEMI type 2 due to demand ischemia  Nonsustained ventricular tachycardia  Troponin 0.15 on arrival. Felt to be  due to demand ischemia in setting of acute SDH and from being down.  - 14 beat run early AM on 5/22  - Electrolytes unremarkable  - Started on metoprolol 12.5 mg bid     Essential hypertension  [PTA: amlodipine 2.5 mg daily]  - Amlodipine has been increased as noted above     DM2 without complications  Diet-controlled. A1c 8.5. Blood sugars were initially elevated to the mid 200-300 range, and he was started on Lantus, but this was discontinued (5/19) due to progressive downward trend in blood sugars.  - severe uncontrolled hyperglycemia to mid 200's to 300's  - will increase to lantus 20 units daily today, and increase to high intensity sliding scale     Hypothyroidism  TSH 2.42 on admission  - Continues on PTA levothyroxine 100 mcg daily     FEN: NPO, tube feeds  DVT Prophylaxis: Pneumatic Compression Devices  Code Status: DNR/DNI     Disposition: Expected discharge to TCU once hypernatremia resolves, he is off IV fluids, and pending plan for nutrition, 2-3 more days    Nikunj Connolly  Text Page (7 am to 6 pm)    Interval History   The patient is resting comfortably in bed.  He has no acute complaints.    -Data reviewed today: I reviewed all new labs and imaging results over the last 24 hours. I personally reviewed no images or EKG's today.    Physical Exam   Temp: 97.6  F (36.4  C) Temp src: Oral BP: 103/59   Heart Rate: 70 Resp: 18 SpO2: 98 % O2 Device: None (Room air)    Vitals:    05/17/19 1815 05/22/19 0400   Weight: 74.6 kg (164 lb 7.4 oz) 75.3 kg (166 lb)     Vital Signs with Ranges  Temp:  [97.1  F (36.2  C)-98.5  F (36.9  C)] 97.6  F (36.4  C)  Heart Rate:  [69-73] 70  Resp:  [16-18] 18  BP: (103-148)/(54-72) 103/59  SpO2:  [94 %-99 %] 98 %  I/O last 3 completed shifts:  In: 1308 [I.V.:1028; NG/GT:280]  Out: 950 [Urine:950]    Gen: Well nourished, well developed, alert and oriented x 3, no acute distressed  HEENT: Atraumatic, normocephalic  Lungs: Clear to ausculation without wheezes, rhonchi, or  rales  Heart: Regular rate and rhythm, no gallops or rubs, no murmurs  GI: Bowel sound normal, no hepatosplenomegaly or masses  Lymph: No lymphadenopathy or edema  Skin: No rashes     Medications     IV fluid REPLACEMENT ONLY       sodium chloride 50 mL/hr at 05/23/19 0800       amLODIPine  5 mg Oral or Feeding Tube Daily     insulin aspart  1-12 Units Subcutaneous Q4H     [START ON 5/24/2019] insulin glargine  20 Units Subcutaneous Daily     insulin glargine  6 Units Subcutaneous Once     levothyroxine  100 mcg Oral or Feeding Tube Daily     metoprolol  12.5 mg Per Feeding Tube BID       Data   Recent Labs   Lab 05/23/19  0607 05/22/19  1954 05/22/19  0902  05/21/19  0812 05/20/19  1041 05/19/19  0441 05/18/19  0428 05/17/19 2006 05/17/19  1442 05/17/19  1402   WBC  --   --   --   --  8.5 9.1 9.8 14.5*  --   --  18.9*   HGB  --   --   --   --  12.8* 12.8* 11.7* 12.0*  --   --  15.4   MCV  --   --   --   --  92 91 89 88  --   --  90   PLT  --   --   --   --  141* 142* 129* 133*  --   --  207   INR  --   --   --   --   --   --   --  1.21* 1.21*  --  2.48*    150* 152*   < > 154* 156* 151* 141  --   --  136   POTASSIUM 3.9  --  3.6  --  3.6 3.6 3.8 4.1  --   --  5.3   CHLORIDE 115*  --  122*  --  124* 126* 121* 112*  --   --  103   CO2 23  --  23  --  22 23 18* 19*  --   --  22   BUN 43*  --  50*  --  62* 64* 91* 81*  --   --  69*   CR 1.77*  --  2.11*  --  2.15* 2.26* 3.13* 2.79*  --   --  2.10*   ANIONGAP 6  --  7  --  8 7 12 10  --   --  11   [place] 8.2*  --  8.5  --  8.9 9.1 8.4* 8.0*  --   --  8.7   *  --  327*  --  264* 121* 135* 303*  --   --  389*   ALBUMIN  --   --   --   --   --   --   --  2.7*  --   --  3.2*   PROTTOTAL  --   --   --   --   --   --   --  6.0*  --   --  8.0   BILITOTAL  --   --   --   --   --   --   --  2.9*  --   --  3.4*   ALKPHOS  --   --   --   --   --   --   --  64  --   --  82   ALT  --   --   --   --   --   --   --  31  --   --  44   AST  --   --   --   --   --   --   --   38  --   --  93*   TROPONIN  --   --   --   --   --   --   --   --   --  0.15*  --     < > = values in this interval not displayed.       No results found for this or any previous visit (from the past 24 hour(s)).

## 2019-05-23 NOTE — PLAN OF CARE
Pt here with large R acute and chronic SDH with mild midline shift. Pt A&O x 1-2, confused to situation and intermittent confusion to place and time, forgetful. [place]. Soft BP, patient asymptomatic, MD notified, otherwise VSS, on RA. LS clear. Tele 100% V-paced. Denies pain. LUE/LLE +1 edema noted, LUE hemiparesis, 4/5, moderate hand , L pronator drift, LLE hemiparesis, 3/5, with moderate  plantar/dorsi flexion otherwise CMS and Neuro's intact. NPO. TF at goal rate 60mL/hr with 30mL free water flushes q4hrs. BG check q4hrs, BG's elevated, sliding scale insulin given. Freq oral care given. Incont of urine at times, otherwisse voids in urinal. +BS, passing flatus, no BM today. WOC RN provided wound care to coccyx pressure injury and upper mid back abrasion wound, new dressings CDI. Up A2 w/ GB and lonnie steady to chair. T&R q2hrs right and left side d/t pressure injury with lift. Sodium q6hr lab draws, Na+ 146 at noon, 1800 lab draw scheduled. NS 50mL/hr.TCU at time of discharge. Nrsg will continue to monitor.

## 2019-05-23 NOTE — PROVIDER NOTIFICATION
Writer paged Dr. Connolly, informed and inquired, NA+ level 144 with AM lab draw, fluids were changed to 50mL NS per oncall hospitalist... Do you want to order Na+ lab redraw. Please place order or advise.

## 2019-05-23 NOTE — PLAN OF CARE
Discharge Planner PT   Patient plan for discharge: home   Current status: Pt lethargic, but agreeable to PT. Inconsistent performance with LE exercises, but appears 3/5 throughout L LE. Max-A supine>sit. Max to CGA for sitting balance, posterior lean to R and L. Max-A of 2 sit<>stand with FWW, pt not able to fully extend knees. Irene Davis used bed<>chair with mod-A of 2. Up in chair at end of tx. Nursing notified bed is soiled and wound uncovered.  Barriers to return to prior living situation: level of assist required, lives alone, fall risk, weakness  Recommendations for discharge: TCU  Rationale for recommendations: Pt would benefit from PT at TCU to progress strength, balance and mobility so pt can return home safely.        Entered by: Monty Nair 05/23/2019 10:26 AM

## 2019-05-23 NOTE — PROVIDER NOTIFICATION
MD Notification    Notified Person: MD    Notified Person Name: Dr. Cunningahm    Notification Date/Time: 5/23/19 6:40 AM    Notification Interaction: Phone call    Purpose of Notification: Sodium dropped from 151 to 144    Orders Received: Stopped D5 infusing, begin NS at 50 mL/hr

## 2019-05-23 NOTE — PROGRESS NOTES
SW:  D: Referrals out to TCU and pt accepted at [facility].  I: JUAN MIGUEL contacted Corin with [facility] and updated with delay in discharge.  JUAN MIGUEL met with pt's daughter Tami this pm to provide update and she requests that SW contact  [facility] once more to check referral as pt's discharge has been delayed and this is preferred location.  JUAN MIGUEL called [facility] admissions and spoke with Hosea. He requested new referral as he did not have current one and   Also indicated that he had a couple unanticipated openings. Referral sent via DOD and pt and daughter updated.  Discussed cost of transportation with Tami and she will made decision tomorrow about transport.  P: JUAN MIGUEL continuing.    FINA Watson  [facility]  Phone: [phone]

## 2019-05-23 NOTE — PROVIDER NOTIFICATION
Writer paged Dr Connolly notified low BP 98/53, patient up in chair. Patient asymptomatic.    Writer spoke to Dr Connolly on the phone, continue to monitor.

## 2019-05-24 NOTE — PLAN OF CARE
Discharge Planner SLP   Patient plan for discharge: Did not state  Current status: Continued excellent participation in pharyngeal exercises. Overt s/sx of aspiration with swabs and ice chips x4. Continued severe dysphagia and expected slow progress - may need to consider long term nutrition pending progress.    Written instructions left on white board to target effortful swallow ?swallow a golf ball? x10 and chin tuck against resistance (rolled up wash cloth under chin and cue pt to push chin into wash cloth as hard as possible, hold for 5 seconds and complete x20 if pt able to tolerate). Continue NPO with TF; use ice water to complete oral cares.    Barriers to return to prior living situation: Dysphagia  Recommendations for discharge: TCU  Rationale for recommendations: Continue ST daily for dysphagia management. Pt motivated and able to tolerate exercise program.       Entered by: Nidhi Collins 05/24/2019 9:06 AM

## 2019-05-24 NOTE — PROGRESS NOTES
[facility]    Hospitalist Progress Note    Date of Service (when I saw the patient): 05/24/2019    Assessment & Plan   Jayson Leija is a 89 year old male with hx of chronic a-fib/complete heart block s/p PPM on chronic anticoagulation with warfarin who initially presented to [facility] on 5/17/2019 after being found down at home. He was found to have a large right-sided subdural hematoma, and was transferred to [facility] for further evaluation and management     Acute on chronic large right-sided traumatic SDH with cerebral edema following unwitnessed fall  Found down at home by daughter. CT head on arrival showed large right-sided subdural hematoma felt to represent acute on chronic hemorrhage with associated midline shift and mass effect. In the ED, he was given KCentra, vitamin K, and mannitol. Neurosurgery and neurocritical care were consulted on admission, and the decision was made with the family to manage the bleed medically and avoid surgery. He was initiated on a nicardipine infusion for strict blood pressure control. Serial head CTs have been stable without increase in bleed. Family was initially leaning toward strict comfort cares, but since patient's mental status improved, they would now like to pursue restorative cares (short of surgery). He is DNR/DNI  - Residual left-sided weakness, dysphagia, and cognitive impairment  - Goal normotension  - 5/20 increased amlodipine from 2.5 to 5. BP well controlled in the 110s today   - Warfarin has been discontinued indefinitely  - PT/OT/SLP recommending TCU  - Follow up with Neurosurgery in 6 weeks     Suspected dementia with possible delirium  Patient's family reports cognitive decline for several months PTA. He was somnolent and difficult to arouse on admission. His level of consciousness has improved, but he remains disoriented to situation with short term memory. SLUMS 10/30 per OT.   - On delirium protocol. Hold antipsychotics for now  unless patient becomes agitated     Dysphagia due to ICH  He has been evaluated by SLP who has recommended alternative means of nutrition due to observed jocelyne aspiration. Discussed nutrition options with patient and family, and they have decided to pursue tube feeds. Patient will try an NJ tube first, and if he tolerates this (e.g, doesn't pull it out), will likely pursue PEG tube prior to discharge. If he pulls it out and/or decides that does not want a feeding tube, patient and family are open to accepting the risk of aspiration and following the safest possible diet as recommended by SLP. If he tries a modified diet and doesn't want to continue, they are open to focusing on comfort and allowing him to eat for pleasure  - NJ tube placed by IR on 5/20  - tube feeds currently at goal of 60 ml/hr     Hypernatremia  Na 151, down from 156 on 5/20, due to lack of free water intake while NPO  - on 5/20 had D5W for 500 ml  - will increase free water flushes from 60 ml every 4 hours to every 2 hours  - Increase D5W to 100 ml/hr for now  - Na down to 144 this AM, IVF discontinued, and free flush reduced to 30 ml q 4 hours  Sodium at 147. 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 Suspect pre-renal state due to decreased intake. Improved with IV fluids  - Cr now at baseline at 1.8 today   - Recheck in AM     Mild rhabdomyolysis  CK level on admission 2190, due to prolonged period of being down. Improved appropriately with IV fluids  - Holding PTA pravastatin     Sacral wounds, unstageable  Present on admission. [place] by WOC RN to be multifactorial from fall, friction from scooting, and probable pressure components  - Wound cares in place as per WOC RN     Chronic atrial fibrillation  History of complete heart block s/p PPM  - Warfarin has been discontinued indefinitely     NSTEMI  type 2 due to demand ischemia  Nonsustained ventricular tachycardia  Troponin 0.15 on arrival. Felt to be due to demand ischemia in setting of acute SDH and from being down.  - 14 beat run early AM on 5/22  - Electrolytes unremarkable  - Started on metoprolol 12.5 mg bid     Essential hypertension  [PTA: amlodipine 2.5 mg daily]  - Amlodipine has been increased as noted above     DM2 without complications  Diet-controlled. A1c 8.5. Blood sugars were initially elevated to the mid 200-300 range, and he was started on Lantus, but this was discontinued (5/19) due to progressive downward trend in blood sugars.  - severe uncontrolled hyperglycemia to mid 200's to 300's  - will increase to lantus from 20 to  30 units daily today, and increased to high intensity sliding scale     Hypothyroidism  TSH 2.42 on admission  - Continues on PTA levothyroxine 100 mcg daily     FEN: NPO, tube feeds  DVT Prophylaxis: Pneumatic Compression Devices  Code Status: DNR/DNI     Disposition: Expected discharge to TCU in 1-2days if sodium stays stable today and TCu bed is available     Jacqueline Escobar History   The patient is resting comfortably in bed.  He has no acute complaints.    -Data reviewed today: I reviewed all new labs and imaging results over the last 24 hours. I personally reviewed no images or EKG's today.    Physical Exam   Temp: 97.4  F (36.3  C) Temp src: Oral BP: 115/61   Heart Rate: 91 Resp: 18 SpO2: 100 % O2 Device: None (Room air)    Vitals:    05/17/19 1815 05/22/19 0400   Weight: 74.6 kg (164 lb 7.4 oz) 75.3 kg (166 lb)     Vital Signs with Ranges  Temp:  [97.4  F (36.3  C)-98.7  F (37.1  C)] 97.4  F (36.3  C)  Heart Rate:  [69-91] 91  Resp:  [18] 18  BP: ()/(48-75) 115/61  SpO2:  [92 %-100 %] 100 %  I/O last 3 completed shifts:  In: 190 [NG/GT:190]  Out: 500 [Urine:500]    Gen: Well nourished, well developed, alert and oriented x 3, NAD  HEENT: Atraumatic, normocephalic  Lungs: Clear to ausculation  without wheezes, rhonchi, or rales  Heart: Regular rate and rhythm, no gallops or rubs, no murmurs  GI: Bowel sound normal, no hepatosplenomegaly or masses  Lymph: No lymphadenopathy or edema  Skin: No rashes     Medications     IV fluid REPLACEMENT ONLY         amLODIPine  5 mg Oral or Feeding Tube Daily     insulin aspart  1-12 Units Subcutaneous Q4H     insulin glargine  10 Units Subcutaneous Once     [START ON 5/25/2019] insulin glargine  30 Units Subcutaneous Daily     levothyroxine  100 mcg Oral or Feeding Tube Daily     metoprolol  12.5 mg Per Feeding Tube BID       Data   Recent Labs   Lab 05/24/19  1155 05/24/19  0603 05/23/19  2340  05/23/19  0607  05/22/19  0902  05/21/19  0812 05/20/19  1041 05/19/19  0441 05/18/19  0428 05/17/19 2006 05/17/19  1442 05/17/19  1402   WBC  --   --   --   --   --   --   --   --  8.5 9.1 9.8 14.5*  --   --  18.9*   HGB  --   --   --   --   --   --   --   --  12.8* 12.8* 11.7* 12.0*  --   --  15.4   MCV  --   --   --   --   --   --   --   --  92 91 89 88  --   --  90   PLT  --   --   --   --   --   --   --   --  141* 142* 129* 133*  --   --  207   INR  --   --   --   --   --   --   --   --   --   --   --  1.21* 1.21*  --  2.48*   * 145* 148*   < > 144   < > 152*   < > 154* 156* 151* 141  --   --  136   POTASSIUM  --  3.7  --   --  3.9  --  3.6  --  3.6 3.6 3.8 4.1  --   --  5.3   CHLORIDE  --  114*  --   --  115*  --  122*  --  124* 126* 121* 112*  --   --  103   CO2  --  25  --   --  23  --  23  --  22 23 18* 19*  --   --  22   BUN  --  44*  --   --  43*  --  50*  --  62* 64* 91* 81*  --   --  69*   CR  --  1.89*  --   --  1.77*  --  2.11*  --  2.15* 2.26* 3.13* 2.79*  --   --  2.10*   ANIONGAP  --  6  --   --  6  --  7  --  8 7 12 10  --   --  11   [place]  --  8.0*  --   --  8.2*  --  8.5  --  8.9 9.1 8.4* 8.0*  --   --  8.7   GLC  --  252*  --   --  292*  --  327*  --  264* 121* 135* 303*  --   --  389*   ALBUMIN  --   --   --   --   --   --   --   --   --   --   --   2.7*  --   --  3.2*   PROTTOTAL  --   --   --   --   --   --   --   --   --   --   --  6.0*  --   --  8.0   BILITOTAL  --   --   --   --   --   --   --   --   --   --   --  2.9*  --   --  3.4*   ALKPHOS  --   --   --   --   --   --   --   --   --   --   --  64  --   --  82   ALT  --   --   --   --   --   --   --   --   --   --   --  31  --   --  44   AST  --   --   --   --   --   --   --   --   --   --   --  38  --   --  93*   TROPONIN  --   --   --   --   --   --   --   --   --   --   --   --   --  0.15*  --     < > = values in this interval not displayed.       No results found for this or any previous visit (from the past 24 hour(s)).

## 2019-05-24 NOTE — PLAN OF CARE
A&Ox1, self only. VSS on RA. Denies pain. Up with lift assist, T/R q2h. NPO; Tube feed via NG tube infusing at 60ml/hr (goal); with FWF 60ml/hr q4h, increased today. Coccyx and mid back wounds cleansed, new dressings placed.  & 240; q4h checks, continue sliding scale, lantus increased today. Tele 100% v-paced. LS diminished. Neuros intact ex L drift, L hemiparesis. IV SL, IVF disontinued. Na 147. Incontinent B/B. Possible discharge tomorrow, pending insurance auth.

## 2019-05-24 NOTE — PLAN OF CARE
Discharge Planner OT   Patient plan for discharge: Rehab  Current status: pt completed supine to sit EOB with log roll and mod A, cues with assist needed to scoot forward on bed. Mod A of 2 and use of lonnie stedy sit to stand, pt was able to  lonnie stedy with min to mod A of 2 and cues to right self back to midline while completing grooming task  Barriers to return to prior living situation: Current level of assist, weakness, impaired cognition, lives alone  Recommendations for discharge: TCU per plan established by the Occupational Therapist  Rationale for recommendations: Pt will benefit from continued skilled OT to improve strength and ROM and maximize IND in self-care and fx transfers. Pt may be candidate for ARU with improved activity tolerance         Entered by: Maritza Barahona 05/24/2019 3:11 PM

## 2019-05-24 NOTE — PLAN OF CARE
Discharge Planner PT   Patient plan for discharge: home   Current status: Mod-A supine>sit. Sitting balance and midline improving, able to sit SBA with UE support at midline with mirror. Mod-A of 2 to stand with FWW, strong posterior lean in standing. Performs short gait to bedside chair with FWW and mod-A of 2.  Barriers to return to prior living situation: level of assist required, lives alone, fall risk, weakness  Recommendations for discharge: TCU  Rationale for recommendations: Pt would benefit from PT at TCU to progress strength, balance and mobility so pt can return home safely.        Entered by: Monty Nair 05/24/2019 10:11 AM

## 2019-05-24 NOTE — PLAN OF CARE
A&Ox 1-2, oriented to shift and intermittently place. Forgetful. [place]. Neuros intact ex L drift,L hemiparesis. VSS on RA. Tele 100% V paced. NPO: TF infusing at 60mL/ hr, 30 mL flush every 4 hours. Up with A2 Lift. turn and reposition every 2 hours. Incontinent. GB checks every 4 hours- s/s insulin given. Denies pain. TCU at discharge possible today pending sodium. Nursing will continue to monitor.

## 2019-05-25 NOTE — PROVIDER NOTIFICATION
MD Notification    Notified Person: MD    Notified Person Name: Dr Gamboa    Notification Date/Time: 5/25/19 0104    Notification Interaction: Phone paged    Purpose of Notification: Increased Na level to 149 from 147.     Orders Received: Awaiting return call    Comments:     Call back: Continue to monitor and Recheck at 0600 w/ BMP.

## 2019-05-25 NOTE — PLAN OF CARE
Discharge Planner SLP   Patient plan for discharge: TCU  Current status: Swallow Tx was provided this am.  Patient was able to complete exercises with mod-max cues.  Slight improvement in swallow function; however, laryngeal elevation remains variable with poor elevation and delayed initiation at times.  Wet vocal quality noted during ice chips and thin liquid trials.  Delayed throat clear noted x 1 after honey thick liquid trials.  Patient continues to be at high risk for silent aspiration with po intake.  Recommend NPO status and frequent oral cares with ice water swabs (squeezed out).  Recommend continued completion of exercises as written on board with staff/family/SLP.  Recommend repeat OP video swallow study in approximately 1 week to allow for continued swallow Tx in attempt to improve function further.  If longer term alternative nutrition/hydration consideration to be addressed prior to discharge, can repeat video swallow study prior to discharge.  Education was provided to RN, family, and MD paged regarding above.  Barriers to return to prior living situation: Level of assist, confusion  Recommendations for discharge: TCU with SLP Tx  Rationale for recommendations: SLP swallow Tx to maximize function for safe po intake       Entered by: Maria Isabel Enrique 05/25/2019 11:53 AM

## 2019-05-25 NOTE — PLAN OF CARE
A&Ox2. Disoriented to time/place. Forgetful. [place]. VSS on RA. LUE slightly weaker than RUE, LUE drip, Moving all extremities, Otherwise Neuros and CMS intact. Tele 100% V-paced. L/s diminished. NPO-oral care provided. TF running at goal of 60ml/hr w/ free water flushes 60ml q4hr. On Serial Na check q6hrs. Na 149 at midnight-hospitalist notified-no new orders. Recheck at 0600 pending. BG check q4hrs, 216, 245-covered w/ insulin per sliding scale. +BS. Voids in urinal, incontinent of bladder at times. Mepilex to coccyx-CDI. Mid-spine mepilex-changed. Turn and repo done in bed. Up w/ lift Ax2. Discharge to TCU pending progress.

## 2019-05-25 NOTE — PROGRESS NOTES
[facility]    Hospitalist Progress Note    Date of Service (when I saw the patient): 05/25/2019    Assessment & Plan   Jayson Leija is a 89 year old male with hx of chronic a-fib/complete heart block s/p PPM on chronic anticoagulation with warfarin who initially presented to [facility] on 5/17/2019 after being found down at home. He was found to have a large right-sided subdural hematoma, and was transferred to [facility] for further evaluation and management     Acute on chronic large right-sided traumatic SDH with cerebral edema following unwitnessed fall  Found down at home by daughter. CT head on arrival showed large right-sided subdural hematoma felt to represent acute on chronic hemorrhage with associated midline shift and mass effect. In the ED, he was given KCentra, vitamin K, and mannitol. Neurosurgery and neurocritical care were consulted on admission, and the decision was made with the family to manage the bleed medically and avoid surgery. He was initiated on a nicardipine infusion for strict blood pressure control. Serial head CTs have been stable without increase in bleed. Family was initially leaning toward strict comfort cares, but since patient's mental status improved, they would now like to pursue restorative cares (short of surgery). He is DNR/DNI  - Residual left-sided weakness, dysphagia, and cognitive impairment, is slowly improving.  - Goal normotension  - 5/20 increased amlodipine from 2.5 to 5. BP well controlled at this time.  - Warfarin has been discontinued indefinitely  - PT/OT/SLP recommending TCU  - Follow up with Neurosurgery in 6 weeks    Overall the patient is improved, is awake and alert, left-sided weakness is improving as well.  Has good cough reflex.  We will have the speech evaluate the patient today to see if he can advance his diet orally.  PT and OT to continue to follow.     Suspected dementia with possible delirium  Patient's family reports cognitive  decline for several months PTA. He was somnolent and difficult to arouse on admission. His level of consciousness has improved, but he remains disoriented to situation with short term memory. SLUMS 10/30 per OT.   - On delirium protocol. Hold antipsychotics for now unless patient becomes agitated     Dysphagia due to ICH  He has been evaluated by SLP who has recommended alternative means of nutrition due to observed jocelyne aspiration. Discussed nutrition options with patient and family, and they have decided to pursue tube feeds. Patient will try an NJ tube first, and if he tolerates this (e.g, doesn't pull it out), will likely pursue PEG tube prior to discharge. If he pulls it out and/or decides that does not want a feeding tube, patient and family are open to accepting the risk of aspiration and following the safest possible diet as recommended by SLP. If he tries a modified diet and doesn't want to continue, they are open to focusing on comfort and allowing him to eat for pleasure  - NJ tube placed by IR on 5/20  - tube feeds currently at goal of 60 ml/hr     Hypernatremia  Na 151, down from 156 on 5/20, due to lack of free water intake while NPO  - on 5/20 had D5W for 500 ml, now discontinued.  -I will increase free water flushes from 100 ml every 4 hours, will like to keep the sodium on the higher side of normal.  Sodium at 147.  I think which it is acceptable at this time.  IV fluids are now discontinued.       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 Suspect pre-renal state due to decreased intake. Improved with IV fluids  - Cr now at baseline at 1.98 , remains stable       Mild rhabdomyolysis  CK level on admission 2190, due to prolonged period of being down. Improved appropriately with IV fluids  - Holding PTA pravastatin     Sacral wounds, unstageable  Present on admission. [place] by WOC RN to be multifactorial from fall, friction from scooting, and probable  pressure components  - Wound cares in place as per WOC RN     Chronic atrial fibrillation  History of complete heart block s/p PPM  - Warfarin has been discontinued indefinitely     NSTEMI type 2 due to demand ischemia  Nonsustained ventricular tachycardia  Troponin 0.15 on arrival. Felt to be due to demand ischemia in setting of acute SDH and from being down.  - 14 beat run early AM on 5/22  - Electrolytes unremarkable  - Started on metoprolol 12.5 mg bid  Stable at this time.     Essential hypertension  [PTA: amlodipine 2.5 mg daily]  - Amlodipine has been increased as noted above     DM2 without complications  Diet-controlled. A1c 8.5. Blood sugars were initially elevated to the mid 200-300 range, and he was started on Lantus, but this was discontinued (5/19) due to progressive downward trend in blood sugars.  - severe uncontrolled hyperglycemia to mid 200's to 300's  - will increase to lantus from 20 to  30 units daily today, and increased to high intensity sliding scale     Hypothyroidism  TSH 2.42 on admission  - Continues on PTA levothyroxine 100 mcg daily     FEN: NPO, tube feeds  DVT Prophylaxis: Pneumatic Compression Devices  Code Status: DNR/DNI     Disposition: Expected discharge to TCU when bed available.      Increase free water flushes to 100 mL every 4 hours  Speech to evaluate the patient.  Overall look improved.  PT OT and speech to continue follow  Awaiting placement at this time.      Robert Perrin      Interval History   He is doing really well this morning sitting in a chair, denies any headache dizziness lightheadedness no chest pain shortness of breath fever chills cough no abdominal pain dysuria hematuria constipation diarrhea at this time.  Feeling hungry and wanted to eat.    No other significant event overnight    -Data reviewed today: I reviewed all new labs and imaging results over the last 24 hours. I personally reviewed no images or EKG's today.    Physical Exam   Temp: 97.8  F (36.6   C) Temp src: Oral BP: 112/82   Heart Rate: 70 Resp: 18 SpO2: 96 % O2 Device: None (Room air)    Vitals:    05/17/19 1815 05/22/19 0400   Weight: 74.6 kg (164 lb 7.4 oz) 75.3 kg (166 lb)     Vital Signs with Ranges  Temp:  [97.7  F (36.5  C)-98.6  F (37  C)] 97.8  F (36.6  C)  Heart Rate:  [69-71] 70  Resp:  [18] 18  BP: (111-124)/(54-82) 112/82  SpO2:  [95 %-100 %] 96 %  I/O last 3 completed shifts:  In: 290 [NG/GT:290]  Out: 750 [Urine:750]    Gen: Well nourished, well developed, alert and oriented x 3, NAD  HEENT: Atraumatic, normocephalic  Lungs: Clear to ausculation without wheezes, rhonchi, or rales  Heart: Regular rate and rhythm, no gallops or rubs, no murmurs  GI: Bowel sound normal, no hepatosplenomegaly or masses  Lymph: No lymphadenopathy or edema  Skin: No rashes     Medications     IV fluid REPLACEMENT ONLY         amLODIPine  5 mg Oral or Feeding Tube Daily     insulin aspart  1-12 Units Subcutaneous Q4H     insulin glargine  30 Units Subcutaneous Daily     levothyroxine  100 mcg Oral or Feeding Tube Daily     metoprolol  12.5 mg Per Feeding Tube BID       Data   Recent Labs   Lab 05/25/19  1149 05/25/19  0614 05/24/19  2356  05/24/19  0603  05/23/19  0607  05/21/19  0812 05/20/19  1041 05/19/19  0441   WBC  --   --   --   --   --   --   --   --  8.5 9.1 9.8   HGB  --   --   --   --   --   --   --   --  12.8* 12.8* 11.7*   MCV  --   --   --   --   --   --   --   --  92 91 89   PLT  --   --   --   --   --   --   --   --  141* 142* 129*   * 147* 149*   < > 145*   < > 144   < > 154* 156* 151*   POTASSIUM  --  3.8  --   --  3.7  --  3.9   < > 3.6 3.6 3.8   CHLORIDE  --  115*  --   --  114*  --  115*   < > 124* 126* 121*   CO2  --  26  --   --  25  --  23   < > 22 23 18*   BUN  --  46*  --   --  44*  --  43*   < > 62* 64* 91*   CR  --  1.98*  --   --  1.89*  --  1.77*   < > 2.15* 2.26* 3.13*   ANIONGAP  --  6  --   --  6  --  6   < > 8 7 12   [place]  --  8.5  --   --  8.0*  --  8.2*   < > 8.9 9.1 8.4*    GLC  --  241*  --   --  252*  --  292*   < > 264* 121* 135*    < > = values in this interval not displayed.       No results found for this or any previous visit (from the past 24 hour(s)).

## 2019-05-25 NOTE — PLAN OF CARE
Discharge Planner OT   Patient plan for discharge: Rehab  Current status: Pt completed bed mobility with MIN A of 2 for supine to sit, sat EOB for 5 min, completed 2 standing trials with MIN A of 2 to stand to 2WW, however, once up required MIN-MOD of 2 to stay upright in midline frequent VC, pt completed 1 hand releases in preparation for ADL tasks. SIde steps with MOD A of 2, sit to supine with MOD A of 2.   Barriers to return to prior living situation: Current level of assist, weakness, impaired cognition, lives alone  Recommendations for discharge: TCU   Rationale for recommendations: Pt will benefit from continued skilled OT to improve strength and ROM and maximize IND in self-care and functional transfers. Pt may be candidate for ARU with improved activity tolerance         Entered by: Josy Aburto 05/25/2019 5:06 PM

## 2019-05-25 NOTE — PROVIDER NOTIFICATION
MD Notification    Notified Person: MD Perrin    Notified Person Name:    Notification Date/Time: 5/25/19 0743    Notification Interaction: text page    Purpose of Notification: Good Morning, Pt's serial Na draw at 0600 result is 147.     Orders Received:    Comments:

## 2019-05-25 NOTE — PLAN OF CARE
Oriented to self only, waxes and wanes to place and situation. L side weaker than left but no drift noted on second exam.  L side able to do heel to shin on second exam with increased strength. A2 lift. Turn/repo q2. Incontinent at times. NG tube feeding at goal rate of 60ml/hr with 60ml flushes q4. NPO. Wound dressings CDI. CMS intact. Tele 100% vpaced. Na 147. Waiting for insurance auth.

## 2019-05-25 NOTE — PLAN OF CARE
Discharge Planner PT   Patient plan for discharge: Did not state  Current status: Pt pleasant and cooperative. Pt requires mod-A for supine>sit. Needs varying modA-SBA for sitting balance at EOB, demo's posterior and L lean, improves with visual feedback via mirror. Pt transfers sit<>stand, bed>BSC, and BSC to chair with modA of 2, single step cues for sequence and assist to manage walker. Noting LLE weakness with mobility but no overt buckling.  Barriers to return to prior living situation: level of assist required, lives alone, fall risk, weakness  Recommendations for discharge: TCU  Rationale for recommendations: Pt would benefit from continued skilled PT services to progress functional strength, balance, activity tolerance, safety and IND with functional mobility so pt can return home safely. Pt may be ARC candidate as pt demo's good improvement in activity tolerance, excellent participation, but has limited social support.       Entered by: Jojo Dawson 05/25/2019 1:03 PM

## 2019-05-25 NOTE — PLAN OF CARE
A&Ox2-3, forgetful. CMS intact. Neuro's-left sided weakness and left drift, slow and deliberate. Bowel sounds audible, incontinent bladder, BM this shift, passing flatus. Tube feeding going continuous at goal rate at 60 ml/hr with 100 ml every 4 hours free water flushes. Maintained NPO, oral cares completed. Serial Na 147, 147-provider aware. VSS on RA. Dressing changed to spine, sacrum CDI. Up with lift/assist x2. Denies pain. Plan TCU placement,  following.

## 2019-05-25 NOTE — PROGRESS NOTES
JUAN MIGUEL    D:JUAN MIGUEL following for discharge needs. JUAN MIGUEL spoke with admissions rep at [facility] regarding referral .Per admissions staff, Authorization from [employer] insurance was not obtained. Authorization will not be received until after the holiday weekend.     P: Will continue to follow

## 2019-05-26 NOTE — PLAN OF CARE
Pt admitted with SDH and fall. Alert and oriented x3, d/o to place. Forgetful. VSS. Tele 100% V paced. Neuros with confusion/forgetfulness and slight L sided weakness. Trace edema to LEs, CMS otherwise intact. Denies pain. Lung sounds clear. Incontinent of urine. Had sm BM. Up to chair with asst of 2 GB and walker. NPO with tube feed running. Tube feed clogged at 2000, attempting to restore use with enzymatic declogger. Pressure wounds on mid back and coccyx covered with foam dressings. Repositioned side to side q 2 hrs.

## 2019-05-26 NOTE — PLAN OF CARE
Pt here with unwitnessed fall resulting in SDH. A&Ox3. Neuros intact, except LUE/LLE weakness. VSS. Tele 100% v-paced. NPO w/ NG clamped because it is clogged. Clog zapper unsuccessful for third time, MD notified. Up with A2 w/ GB and walker. Incontinent of urine. Denies pain. Plan for video swallow on Tuesday. Discharge plan pending, nursing will continue to monitor.

## 2019-05-26 NOTE — PROVIDER NOTIFICATION
"Dr. Perrin paged, \"NG tube clogged last evening, clog zapper not successful, can you order X-ray to verify placement? Possibly coiled? Also, did you want to check any labs today, since change in TF status? Thank you.\"    ADDENDUM: Abd xray ordered to confirm NG placement.  "

## 2019-05-26 NOTE — PROGRESS NOTES
[facility]    Hospitalist Progress Note    Date of Service (when I saw the patient): 05/26/2019    Assessment & Plan   Jayson Leija is a 89 year old male with hx of chronic a-fib/complete heart block s/p PPM on chronic anticoagulation with warfarin who initially presented to [facility] on 5/17/2019 after being found down at home. He was found to have a large right-sided subdural hematoma, and was transferred to [facility] for further evaluation and management     Acute on chronic large right-sided traumatic SDH with cerebral edema following unwitnessed fall  Found down at home by daughter. CT head on arrival showed large right-sided subdural hematoma felt to represent acute on chronic hemorrhage with associated midline shift and mass effect. In the ED, he was given KCentra, vitamin K, and mannitol. Neurosurgery and neurocritical care were consulted on admission, and the decision was made with the family to manage the bleed medically and avoid surgery. He was initiated on a nicardipine infusion for strict blood pressure control. Serial head CTs have been stable without increase in bleed. Family was initially leaning toward strict comfort cares, but since patient's mental status improved, they would now like to pursue restorative cares (short of surgery). He is DNR/DNI  - Residual left-sided weakness, dysphagia, and cognitive impairment, is slowly improving.  - Goal normotension  - 5/20 increased amlodipine from 2.5 to 5. BP well controlled at this time.  - Warfarin has been discontinued indefinitely  - PT/OT/SLP recommending TCU  - Follow up with Neurosurgery in 6 weeks    Overall the patient is improved, is awake and alert, left-sided weakness is improving as well.  Has good cough reflex.  No official note by the speech on 5/25/2019, PT and OT to continue to follow.  I think patient will benefit from video swallow evaluation prior to discharge, as I think he would do well at this  time.    Suspected dementia with possible delirium  Patient's family reports cognitive decline for several months PTA. He was somnolent and difficult to arouse on admission. His level of consciousness has improved, but he remains disoriented to situation with short term memory. SLUMS 10/30 per OT.   - On delirium protocol. Hold antipsychotics for now unless patient becomes agitated     Dysphagia due to ICH  He has been evaluated by SLP who has recommended alternative means of nutrition due to observed jocelyne aspiration. Discussed nutrition options with patient and family, and they have decided to pursue tube feeds. Patient will try an NJ tube first, and if he tolerates this (e.g, doesn't pull it out), will likely pursue PEG tube prior to discharge. If he pulls it out and/or decides that does not want a feeding tube, patient and family are open to accepting the risk of aspiration and following the safest possible diet as recommended by SLP. If he tries a modified diet and doesn't want to continue, they are open to focusing on comfort and allowing him to eat for pleasure  - NJ tube placed by IR on 5/20  - tube feeds currently at goal of 60 ml/hr    NJ tube blocked overnight, cloak zipper not successful, x-ray abdomen ordered to rule out any kinking or coiling.  X-ray abdomen reviewed no kinking or coiling at this time.  Interventional radiology to evaluate to declog or replace NJ tube.     Hypernatremia  Na 151, down from 156 on 5/20, due to lack of free water intake while NPO  - on 5/20 had D5W for 500 ml, now discontinued.  -I will increase free water flushes from 100 ml every 4 hours, will like to keep the sodium on the higher side of normal.  Sodium at 147.  I think which it is acceptable at this time.  Restarted IV fluids overnight because of clogged NG tube, sodium slightly high because of normal saline, I will switch the IV fluids to 0.45% saline.       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 Suspect pre-renal state due to decreased intake. Improved with IV fluids  - Cr now at baseline at 1.98 , remains stable       Mild rhabdomyolysis  CK level on admission 2190, due to prolonged period of being down. Improved appropriately with IV fluids  - Holding PTA pravastatin     Sacral wounds, unstageable  Present on admission. [place] by WOC RN to be multifactorial from fall, friction from scooting, and probable pressure components  - Wound cares in place as per WOC RN     Chronic atrial fibrillation  History of complete heart block s/p PPM  - Warfarin has been discontinued indefinitely     NSTEMI type 2 due to demand ischemia  Nonsustained ventricular tachycardia  Troponin 0.15 on arrival. Felt to be due to demand ischemia in setting of acute SDH and from being down.  - 14 beat run early AM on 5/22  - Electrolytes unremarkable  - Started on metoprolol 12.5 mg bid  Stable at this time.     Essential hypertension  [PTA: amlodipine 2.5 mg daily]  - Amlodipine has been increased as noted above     DM2 without complications  Diet-controlled. A1c 8.5. Blood sugars were initially elevated to the mid 200-300 range, and he was started on Lantus, but this was discontinued (5/19) due to progressive downward trend in blood sugars.  - severe uncontrolled hyperglycemia to mid 200's to 300's  - will increase to lantus from 20 to  30 units daily today, and increased to high intensity sliding scale     Hypothyroidism  TSH 2.42 on admission  - Continues on PTA levothyroxine 100 mcg daily     FEN: NPO, tube feeds  DVT Prophylaxis: Pneumatic Compression Devices  Code Status: DNR/DNI     Disposition: Expected discharge to TCU when bed available.    Radiology or interventional radiology to check the NJ tube.  Agree with IV fluids till then.  Recommend to do the video swallow evaluation prior to discharge I think patient is getting better at this time.  PT and OT to continue  Awaiting  placement.    Robert Perrin      Interval History   Patient feeling well this morning, feeling hungry as well, asking when he can be discharged.  Denies any headache dizziness lightheadedness no chest pain shortness of breath abdominal pain dysuria hematuria constipation diarrhea at this time.    NJ tube was clogged overnight    No other significant event overnight    -Data reviewed today: I reviewed all new labs and imaging results over the last 24 hours. I personally reviewed no images or EKG's today.    Physical Exam   Temp: 97.3  F (36.3  C) Temp src: Oral BP: 121/58   Heart Rate: 70 Resp: 20 SpO2: 98 % O2 Device: None (Room air)    Vitals:    05/17/19 1815 05/22/19 0400 05/26/19 0510   Weight: 74.6 kg (164 lb 7.4 oz) 75.3 kg (166 lb) 76.2 kg (168 lb)     Vital Signs with Ranges  Temp:  [97.3  F (36.3  C)-98  F (36.7  C)] 97.3  F (36.3  C)  Heart Rate:  [69-73] 70  Resp:  [18-22] 20  BP: (108-146)/(51-82) 121/58  SpO2:  [96 %-99 %] 98 %  I/O last 3 completed shifts:  In: 3826 [NG/GT:350]  Out: 300 [Urine:300]    Gen: Well nourished, well developed, alert and oriented x 3, NAD  HEENT: Atraumatic, normocephalic  Lungs: Clear to ausculation without wheezes, rhonchi, or rales  Heart: Regular rate and rhythm, no gallops or rubs, no murmurs  GI: Bowel sound normal, no hepatosplenomegaly or masses  Lymph: No lymphadenopathy or edema  Skin: No rashes     Medications     IV fluid REPLACEMENT ONLY       dextrose 5% and 0.45% NaCl         amLODIPine  5 mg Oral or Feeding Tube Daily     insulin aspart  1-12 Units Subcutaneous Q4H     insulin glargine  30 Units Subcutaneous Daily     levothyroxine  100 mcg Oral or Feeding Tube Daily     metoprolol  12.5 mg Per Feeding Tube BID       Data   Recent Labs   Lab 05/26/19  0830 05/25/19  1149 05/25/19  0614  05/24/19  0603  05/21/19  0812 05/20/19  1041   WBC 9.6  --   --   --   --   --  8.5 9.1   HGB 10.6*  --   --   --   --   --  12.8* 12.8*   MCV 91  --   --   --   --   --  92  91     --   --   --   --   --  141* 142*   * 147* 147*   < > 145*   < > 154* 156*   POTASSIUM 4.0  --  3.8  --  3.7   < > 3.6 3.6   CHLORIDE 117*  --  115*  --  114*   < > 124* 126*   CO2 25  --  26  --  25   < > 22 23   BUN 42*  --  46*  --  44*   < > 62* 64*   CR 1.96*  --  1.98*  --  1.89*   < > 2.15* 2.26*   ANIONGAP 6  --  6  --  6   < > 8 7   [place] 8.4*  --  8.5  --  8.0*   < > 8.9 9.1   *  --  241*  --  252*   < > 264* 121*   ALBUMIN 2.0*  --   --   --   --   --   --   --     < > = values in this interval not displayed.       Recent Results (from the past 24 hour(s))   XR Abdomen 1 View    Narrative    XR ABDOMEN ONE VIEW  5/26/2019 9:22 AM     COMPARISON: None.    HISTORY: Tube feeding placement.    FINDINGS: Tip of the feeding tube is in the mid third portion of the  duodenum. Abdominal bowel gas pattern is nonspecific. There is no  evidence for free intraperitoneal air.      Impression    IMPRESSION: No evidence for bowel obstruction or free air.

## 2019-05-26 NOTE — PLAN OF CARE
Discharge Planner SLP   Patient plan for discharge: Did not state  Current status: Swallow Tx was provided this am.  Patient was alert and cooperative with poor recall of exercises.  Patient was able to complete exercises with mod-max cues/assist.  Gradual improvement in laryngeal elevation and swallow timing noted; however, increased wet vocal quality noted as honey thick liquid trials continued despite mod-max cues to use strategies.  Patient continues to be at high risk for silent aspiration.  Recommend NPO status and frequent oral cares.  Note clogged TF with nursing working on unclogging, and MD notes with preference for repeat video swallow study prior to discharge.  Given possible longer term tube feeding decision making, can proceed with repeat video swallow study on 5/28 to determine longer term TF needs vs safety for po intake.  Radiology only doing critical STAT needs on 5/27.  Plan to provide swallow Tx 5/27.  Barriers to return to prior living situation: Level of assist, weakness, cognitive deficits  Recommendations for discharge: ARU with SLP  Rationale for recommendations: Pt will be able to tolerate 3 hours of therapy daily. Pt's family very supportive. Good motivation and improvements noted from previous sessions; SLP swallow Tx to maximize function for safe po intake, SLP cognitive-linguistic eval and Tx at next level of care for daily needs         Entered by: Maria Isabel Enrique 05/26/2019 12:23 PM

## 2019-05-26 NOTE — PROVIDER NOTIFICATION
"Dr. Perrin paged, \"Clog zapper unsuccessful. Please advise.\"     ADDENDUM: Telephone order to remove current NG tube and place new one.  "

## 2019-05-26 NOTE — PLAN OF CARE
Discharge Planner PT   Patient plan for discharge: Per family, open to rehab; pt did not state  Current status: Pt pleasant and cooperative, motivated to participate, follows 100% of commands but appears forgetful. Pt performs bed mobility with modA of 1, improved sitting balance this session needing CGA overall; sit<>stand transfers with maxA of 1 progressing to modA of 1 with repetition and cues. Pt ambulated 15'x1 and 8'x1 with modA of 1 and w/c follow. Second assist CGA throughout for safety.  Barriers to return to prior living situation: level of assist required, lives alone, fall risk, weakness  Recommendations for discharge: ARC  Rationale for recommendations: Pt would benefit from continued skilled PT services to progress functional strength, balance, activity tolerance, safety and IND with functional mobility so pt can return home safely. Pt making daily progress with excellent tolerance and participation; family currently discussing how much assist they would be able to provide post-disch from rehab.       Entered by: Jojo Dawson 05/26/2019 11:31 AM

## 2019-05-26 NOTE — PROVIDER NOTIFICATION
Page to hospitalist:  Can we get patient care order for Clog Zapper to try and unclog his feeding tube?  Order placed.

## 2019-05-26 NOTE — PLAN OF CARE
Discharge Planner OT   Patient plan for discharge: Rehab before home    Current status: Pt required min-mod A x2 with FWW for sit<>stand from chair to initiate functional transfer training. Pt completed cognitive screen which indicated a severe impairment with STM and thought/perception deficits (noted improvements with cognition since tested on 5/20/19).    Barriers to return to prior living situation: Fall risk with history; Lives alone; Stairs to enter and within home    Recommendations for discharge: Acute rehab    Rationale for recommendations: Pt continues to be limited by L UE weakness and incoordination, L side neglect, impaired cognition and safety, pain, and decreased balance; however, making gains with OT intervention. Pt will be able to tolerate 3 hours of therapy daily. Pt's family very supportive. Good motivation and improvements noted from previous sessions.        Entered by: Thomas Jarvis 05/26/2019 11:30 AM

## 2019-05-26 NOTE — PLAN OF CARE
Pt disoriented to place. VSS. Denies pain. NPO, oral cares provided. Unable to restart TF after unsuccessful use of clog zapper. NS @ 50 ml/hr started. Pt is incontinent, turned and repositioned q2h. New foam dressing to mid back pressure ulcer. Bottom foam dressing on coccyx CDI.

## 2019-05-27 NOTE — PLAN OF CARE
Discharge Planner PT   Patient plan for discharge: Per discussion with pt's dtr Tami and pt, in agreement for ARC if able  Current status: Pt pleasant and cooperative, motivated to participate, follows 100% of commands. Pt performs bed mobility with modA of 1, able to achieve midline without assist and sit EOB with SBA, does tend to lean to L and posteriorly but corrects without cues; sit<>stand transfers with modA of 1, does well with cues for anterior weight shift. Pt ambulated 60'x1 and 15'x1 with modA of 1 and w/c follow. Family present and educated on updated recommendations of ARC.  Barriers to return to prior living situation: level of assist required, lives alone, fall risk, weakness, impaired balance  Recommendations for discharge: ARC  Rationale for recommendations: Pt making significant improvements with mobility and would benefit from intensive interdisciplinary rehab services to progress functional strength, balance, activity tolerance, safety and IND with functional mobility so pt can return home safely. Pt making daily progress with excellent tolerance and participation. Pt will be able to tolerate 3hrs of therapy per day. Family present and reports they would be able to provide intermittent support to patient post-disch from rehab and would be willing to pursue hired care if pt needing more assist pending progress at rehab.       Entered by: Jojo Dawson 05/27/2019 12:33 PM

## 2019-05-27 NOTE — PLAN OF CARE
Pt here with Fall, SDH. A&Ox3-4, forgetful. Neuros intact, except slight L side weakness. VSS. Tele 100% v-paced. NPO, except for limited ice chips with RN only. TF started this afternoon, 30ml/hr to start per Dr. Sun and increase by 15, flushes well, meds given through tube. Voiding adequately, incontinent at times. Up with A2 w/ GB and walker. Denies pain. Plan for video swallow tomorrow and continue to work w/ therapies. Discharge to ARU pending, nursing will continue to monitor.

## 2019-05-27 NOTE — PLAN OF CARE
Discharge Planner SLP   Patient plan for discharge: Rehab  Current status: Swallow Tx was provided this pm.  Patient completed exercises with min-mod cues.  Patient had difficulty executing the Danette and recalling use of a supraglottic swallow technique.  Intermittent wet vocal quality, throat clear, and cough noted with ice chip and honey thick liquid trials despite mod-max cues/assist.  Patient continues to present with silent aspiration risk for po intake.  Recommend NPO status except for 1-10 ice chips per hour with nursing supervision only, swallow-cough-swallow sequence with single ice chip at a time.  Plan to proceed with the repeat video swallow study 5/28 to assess for improvement in swallow function, safety for po intake, and assist with longer term nutrition/hydration POC.  Pt and family are in agreement with study/plan.  Barriers to return to prior living situation: Level of assist, cognitive deficits  Recommendations for discharge: ARU with SLP Tx  Rationale for recommendations: Pt will be able to tolerate 3 hours of therapy daily. Pt's family very supportive. Good motivation and improvements noted from previous sessions; SLP swallow Tx to maximize function for safe po intake, SLP cognitive-linguistic eval and Tx at next level of care for daily needs         Entered by: Maria Isabel Enrique 05/27/2019 1:42 PM

## 2019-05-27 NOTE — PLAN OF CARE
Pt is A&O3-4, disorient to time at times, forgetful. LE weaknes, otherwise neuros intact. VSS. Tele 100% V-paced, occasional PVC. NPO. Up with A2, GB/walker. Denies pain. Plan NG tube placement today. Discharge pending.

## 2019-05-27 NOTE — PLAN OF CARE
"OT: Pt lying in bed upon arrival. Discussed POC and treatment for the day, pt politely declining due to fatigue, \"already reached my limit\" with therapy for the day. Eager to participate tomorrow  "

## 2019-05-27 NOTE — PROGRESS NOTES
SW:  D: SW spoke with pt daughter who confirms that [facility] is the first choice. SW has left VM for [facility] to confirm that pt has been clinically accepted and we are just awaiting insurance Auth from [facility]. Awaiting response.     Per chart review, all therapies recommending ARU. Referral made via DOD to [facility] per RN [facility] is aware of pt and is following.     P: SW will continue to follow for discharge planning.     FINA Pagan

## 2019-05-27 NOTE — PROGRESS NOTES
[facility]    Hospitalist Progress Note    Date of Service (when I saw the patient): 05/27/2019    Assessment & Plan   Jayson Leija is a 89 year old male with hx of chronic a-fib/complete heart block s/p PPM on chronic anticoagulation with warfarin who initially presented to [facility] on 5/17/2019 after being found down at home. He was found to have a large right-sided subdural hematoma, and was transferred to [facility] for further evaluation and management     Acute on chronic large right-sided traumatic SDH with cerebral edema following unwitnessed fall  Found down at home by daughter. CT head on arrival showed large right-sided subdural hematoma felt to represent acute on chronic hemorrhage with associated midline shift and mass effect. In the ED, he was given KCentra, vitamin K, and mannitol. Neurosurgery and neurocritical care were consulted on admission, and the decision was made with the family to manage the bleed medically and avoid surgery. He was initiated on a nicardipine infusion for strict blood pressure control. Serial head CTs have been stable without increase in bleed. Family was initially leaning toward strict comfort cares, but since patient's mental status improved, they would now like to pursue restorative cares (short of surgery). He is DNR/DNI  - Residual left-sided weakness, dysphagia, and cognitive impairment, is slowly improving.  - Goal normotension  - 5/20 increased amlodipine from 2.5 to 5. BP well controlled at this time.  - Warfarin has been discontinued indefinitely  - PT/OT/SLP recommending TCU  - Follow up with Neurosurgery in 6 weeks    Overall the patient is improved, is awake and alert, left-sided weakness is improving as well.  Has good cough reflex.  No official note by the speech on 5/25/2019, PT and OT to continue to follow.  I think patient will benefit from video swallow evaluation prior to discharge, as I think he would do well at this  time.    Suspected dementia with possible delirium  Patient's family reports cognitive decline for several months PTA. He was somnolent and difficult to arouse on admission. His level of consciousness has improved, but he remains disoriented to situation with short term memory. SLUMS 10/30 per OT.   - On delirium protocol. Hold antipsychotics for now unless patient becomes agitated     Dysphagia due to ICH  He has been evaluated by SLP who has recommended alternative means of nutrition due to observed jocelyne aspiration. Discussed nutrition options with patient and family, and they have decided to pursue tube feeds. Patient will try an NJ tube first, and if he tolerates this (e.g, doesn't pull it out), will likely pursue PEG tube prior to discharge. If he pulls it out and/or decides that does not want a feeding tube, patient and family are open to accepting the risk of aspiration and following the safest possible diet as recommended by SLP. If he tries a modified diet and doesn't want to continue, they are open to focusing on comfort and allowing him to eat for pleasure  - NJ tube placed by IR on 5/20  - tube feeds currently at goal of 60 ml/hr    NJ tube replaced today as it was blocked      Hypernatremia  Na 151, down from 156 on 5/20, due to lack of free water intake while NPO  - on 5/20 had D5W for 500 ml, now discontinued.  -I will increase free water flushes from 100 ml every 4 hours, will like to keep the sodium on the higher side of normal.  Sodium at 147.  I think which it is acceptable at this time.  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 Suspect pre-renal state due to decreased intake. Improved with IV fluids  - Cr now at baseline at 1.98 , remains stable       Mild rhabdomyolysis  CK level on  admission 2190, due to prolonged period of being down. Improved appropriately with IV fluids  - Holding PTA pravastatin     Sacral wounds, unstageable  Present on admission. [place] by WOC RN to be multifactorial from fall, friction from scooting, and probable pressure components  - Wound cares in place as per WOC RN     Chronic atrial fibrillation  History of complete heart block s/p PPM  - Warfarin has been discontinued indefinitely     NSTEMI type 2 due to demand ischemia  Nonsustained ventricular tachycardia  Troponin 0.15 on arrival. Felt to be due to demand ischemia in setting of acute SDH and from being down.  - 14 beat run early AM on 5/22  - Electrolytes unremarkable  - Started on metoprolol 12.5 mg bid  Stable at this time.     Essential hypertension  [PTA: amlodipine 2.5 mg daily]  - Amlodipine has been increased as noted above     DM2 without complications  Diet-controlled. A1c 8.5. Blood sugars were initially elevated to the mid 200-300 range, and he was started on Lantus, but this was discontinued (5/19) due to progressive downward trend in blood sugars.  - severe uncontrolled hyperglycemia to mid 200's to 300's  - will increase to lantus from 20 to  30 units daily today, and increased to high intensity sliding scale     Hypothyroidism  TSH 2.42 on admission  - Continues on PTA levothyroxine 100 mcg daily     FEN: NPO, tube feeds  DVT Prophylaxis: Pneumatic Compression Devices  Code Status: DNR/DNI     Disposition: Expected discharge to TCU when bed available.      Jacqueline Corinne      Interval History   Patient feeling well this morning,NJ tube replaced. Tube feeds to restart soon   -Data reviewed today: I reviewed all new labs and imaging results over the last 24 hours. I personally reviewed no images or EKG's today.    Physical Exam   Temp: 97.6  F (36.4  C) Temp src: Oral BP: 129/72   Heart Rate: 69 Resp: 16 SpO2: 99 % O2 Device: None (Room air)    Vitals:    05/22/19 0400 05/26/19 0510 05/27/19 0211    Weight: 75.3 kg (166 lb) 76.2 kg (168 lb) 75.3 kg (166 lb)     Vital Signs with Ranges  Temp:  [97.5  F (36.4  C)-98.6  F (37  C)] 97.6  F (36.4  C)  Heart Rate:  [69-70] 69  Resp:  [16-18] 16  BP: (104-138)/(52-72) 129/72  SpO2:  [97 %-100 %] 99 %  I/O last 3 completed shifts:  In: 987.5 [I.V.:987.5]  Out: 2050 [Urine:2050]    Gen: Well nourished, well developed, alert and oriented x 3, NAD  HEENT: Atraumatic, normocephalic  Lungs: Clear to ausculation without wheezes, rhonchi, or rales  Heart: Regular rate and rhythm, no gallops or rubs, no murmurs  GI: Bowel sound normal, no hepatosplenomegaly or masses  Lymph: No lymphadenopathy or edema  Skin: No rashes     Medications     IV fluid REPLACEMENT ONLY       D5W         amLODIPine  5 mg Oral or Feeding Tube Daily     insulin aspart  1-12 Units Subcutaneous Q4H     insulin glargine  30 Units Subcutaneous Daily     levothyroxine  100 mcg Oral or Feeding Tube Daily     metoprolol  12.5 mg Per Feeding Tube BID       Data   Recent Labs   Lab 05/27/19  0737 05/26/19  0830 05/25/19  1149 05/25/19  0614  05/21/19  0812   WBC  --  9.6  --   --   --  8.5   HGB  --  10.6*  --   --   --  12.8*   MCV  --  91  --   --   --  92   PLT  --  156  --   --   --  141*   * 148* 147* 147*   < > 154*   POTASSIUM 4.1 4.0  --  3.8   < > 3.6   CHLORIDE 115* 117*  --  115*   < > 124*   CO2 27 25  --  26   < > 22   BUN 41* 42*  --  46*   < > 62*   CR 2.06* 1.96*  --  1.98*   < > 2.15*   ANIONGAP 5 6  --  6   < > 8   [place] 8.4* 8.4*  --  8.5   < > 8.9   * 133*  --  241*   < > 264*   ALBUMIN  --  2.0*  --   --   --   --     < > = values in this interval not displayed.       Recent Results (from the past 24 hour(s))   XR Feeding Tube Placement    Narrative    FEEDING TUBE PLACEMENT 5/27/2019 10:40 AM    COMPARISON: None.    HISTORY: Feeding tube replacement, current NG occluded.    CONTRAST: 10 mL Isovue 240.    MEDICATIONS: 4 mL viscous lidocaine.    FLUOROSCOPY TIME: 3.0  minutes.    Feeding tube length to tip of the nose: 110 cm.    PROCEDURE: The patient was placed in the supine position on the  fluoroscopy table. The right nostril was anesthetized with viscous  topical lidocaine. The feeding tube was also lubricated with viscous  lidocaine. The feeding tube was passed through the right nostril but  could not be passed into the nasopharynx. Therefore, the left nostril  was anesthetized with viscous topical lidocaine, the feeding tube was  then passed through the left nostril, through the esophagus into the  stomach. Using fluoroscopic guidance, the feeding tube was advanced  into the proximal jejunum.    There are were no immediate complications. The patient tolerated the  procedure extremely well.      Impression    IMPRESSION: Successful, uncomplicated, fluoroscopically guided feeding  tube placement.

## 2019-05-28 NOTE — PROGRESS NOTES
[facility] Nurse Inpatient Wound Assessment     Follow up Assessment of wound(s) on pt's:   Upper mid back and sacrum        Data:   Patient History:      per MD note(s): 89 year old male with Afib (on Coumadin) CKD, DMII, HTN, who was found down by his daughter with left-sided weakness.  Based upon his uncollected mail, the family suspects that he fell appoximately 2 days ago.   He presented to the [facility] where he was found to have an acute on chronic right subdural causing compression, mass effect, and subfalcine herniation.  He received KCentra (INR 2.5) and mannitold and was transferredt o [facility].  Repeat INR 1.21   Ben Risk Assessment    Sensory Perception: 3-->slightly limited    Moisture: 3-->occasionally moist   Activity: 3-->walks occasionally     Mobility: 3-->slightly limited   Nutrition: 2-->probably inadequate   Friction and Shear: 2-->potential problem  Ben Score: 16       Positioning: Pillows, pt semi recumbent in bed, on his but, heels on the bed.     Mattress:  Standard ,  Low airloss mattress     Moisture Management:  Diaper    Catheter secured? n/a    Current Diet / Nutrition:   Tube feedings     Wound Assessment (location):   Midline upper back and mid-left sacrum  Wound History:  Pt was found down at home, possibly down 2 days  Wound on upper back is a large, smooth looking tissue in the wound bed, slightly moist with wound bed a mix of yellow and red tissue. Periwound tissue bright red, warm, wound odorous. 6cm x 4cm x 0.2cm+  Pt denies pain.  To the right of the wound the two linear wounds are now dry, lifting scabs, no drainage.     05-28-19   Mid to right spine                            05-23-10 mid to right spine                       05-20-19 Midline to right spine           Midline- left sacral area- wound has become more defined, down to just the central area being moist, flabby, tan- yellow tissue, bome immediately under slough 1.3cm x 1cm x 0.0cm  +, odorous, warm to the touch.  Periwound tissue now healed epidermis   At 7ockock is a small patch of blackish-gray attached eschar, feels thin and lifting at margins.  Denies pain   05-28-19 sacrum                                             05-23-19  Sacrum                                                         05-20-19 sacrum              Intervention:     Patient's chart evaluated.      Wound(s) assessed as noted above, sacral Mepilex removed from coccyx and rolled Mepilex 4x4 from upper back removed for asesssment    Wound Care: cleaned sites with MicroKlenz then skin prep, reapplied clean Mepilex dressings with Iodosorb gel    Orders  Reviewed and updated    Supplies  reviewed    Discussed plan of care with Patient and Nurse- discussed importance of positioning side to side on pt with back wounds, keeping heels elevated and noting frequency of dressing changes.              All patient / family questions answered:  Pt did not have questions          Assessment:          Two wounds on the backside of pt that are likely a mix of trauma from the fall at home, perhaps friction from scooting and probably pressure components, especially the coccyx wound.  Dimensions improving and both wounds more defined, both wounds  clean but there is a slight odor to both wounds with warmth and erythema to the back wound.  Will continue to use Iodosorb Gel on both wounds.  Need to follow wound care orders and strict repositioning         Plan:     Nursing to notify the Provider(s) and re-consult the [facility] Nurse if wound(s) deteriorate(s) or if the wound care plan needs reevaluation.    Plan of care for wound located on upper mid back and sacral wounds: every other day, even days and prn  1. Clean wounds with saline or MicKlenz Spray, pat dry  2. Paint with No Sting Skin Prep (#[id number])) and allow to dry thoroughly  3. Apply light smear of Iodosorb Gel (#[id number]) to both wounds  4. Press a Mepilex Sacral Dressing (PS#[id number]) to both  "wounds- making sure to conform nicely to skin curvatures   (begin placing the Mepilex at the most distal aspect first, smooth into place in an upward direction, then smooth side to side)       To the back wound apply parallel strips of tape to the dressing to help keep tacked down.   5. Time and date dressing change and isabela with a \"T\" for treatment of a wound  6. Reposition pt every 1 to 2 hours when in bed and hourly when up to the chair to relieve pressure and promote perfusion to tissue.  POSITION PT SIDE TO SIDE ONLY WHEN IN BED, KEEP HEELS ELEVATED AT ALL TIMES.     NOTE:  Iodosorb Gel should not be used longer than 14 days. After 14 days the gel should be stopped and a new plan established, this may mean only a simple, gauze dressing.  The Iodosorb Gel should be stopped after use on June 7, 2019.       [facility] Nurse will return: weekly and prn       "

## 2019-05-28 NOTE — PROGRESS NOTES
"CLINICAL NUTRITION SERVICES - REASSESSMENT NOTE      Malnutrition: % Weight Loss:  > 2% in 1 week (severe malnutrition)  % Intake:  </= 50% for >/= 5 days (severe malnutrition)  Subcutaneous Fat Loss:  None observed  Muscle Loss:  Temporal region mild depletion and Clavicle bone region mild depletion  Fluid Retention:  None noted     Malnutrition Diagnosis: Severe malnutrition  In Context of:  Acute illness or injury  Environmental or social circumstances       EVALUATION OF PROGRESS TOWARD GOALS   Diet:  NPO    Nutrition Support:    5/20: Enteral nutrition initiated   5/22: Goal of enteral nutrition was reached, at 60mL/ hr  5/25: FT was blocked, TF held  5/27: NJ tube was replaced, due to blockage   5/28: TF at 45mL/hr plan for goal TF today, at 60mL/hr     Nutrition Support Enteral:  Type of Feeding Tube: NJ  Enteral Frequency:  Continuous  Enteral Regimen:  Isosource 1.5 at 60 mL/hr  Total Enteral Provisions: 2160 kcal (29 kcal/kg), 98 g protein (1.3 g/kg), 253 g CHO, 22 g fiber, 1094 mL H2O   Free Water Flush: 100mL Q4hrs     Intake/Tolerance:    -BGM range 133-178 ( HSSI + Lantus 30 units)  -Na 144 (WNL)  -BM x 1 today     ASSESSED NUTRITION NEEDS:  Dosing Weight 74.6 kg   Estimated Energy Needs: 7049-7215 kcals (25-30 Kcal/Kg)  Justification: maintenance  Estimated Protein Needs:  grams protein (1.2-1.5 g pro/Kg)  Justification: hypercatabolism with acute illness  Estimated Fluid Needs: 3122-4045 mL (1 mL/Kcal)  Justification: maintenance      NEW FINDINGS:   5/27: Per SLP \"Patient continues to present with silent aspiration risk for po intake\" - recommend NPO. Plan for another evaluation today   5/27: FT replaced: \"Using fluoroscopic guidance, the feeding tube was advanced  into the proximal jejunum\"  5/25: Free water flushes increased to 100mL/hr   5/25: Lantus 30 units per day   5/23: Insulin changed to High sliding scale insulin, previously on Medium sliding scale insulin       Previous Goals:   EN " will continue to meet assessed needs  Evaluation: Not met    Previous Nutrition Diagnosis:   No nutrition diagnosis identified at this time   Evaluation: Declining      CURRENT NUTRITION DIAGNOSIS  Inadequate protein-energy intake related to reliant on enteral nutrition to meet assessed needs as evidenced by TF not currently running at goal rate     INTERVENTIONS  Recommendations / Nutrition Prescription  Diet per MD and SLP  Continue to advance to goal of Isosource 1.5 at 60mL per hour  Free water flushes at 100mL Q 4hrs or per MD    Implementation  None at this time    Goals  Enteral nutrition to meet 90%-110% of assessed needs       MONITORING AND EVALUATION:  Progress towards goals will be monitored and evaluated per protocol and Practice Guidelines      Lauryn Cespedes RD, LD

## 2019-05-28 NOTE — PLAN OF CARE
Discharge Planner SLP   Patient plan for discharge: Rehab  Current status: A repeat video swallow study was completed this am.  Patient presents with continued severe oral-pharyngeal dysphagia per today's repeat video swallow study.  Deficits include a significant swallow delay, weak base of tongue function, curved epiglottis with decreased inversion, decreased pharyngeal peristalsis, and piecemeal swallows.  Deficits resulted in repeated penetration with residual and eventual silent aspiration of honey thick liquids, jocelyne silent aspiration of nectar thick liquids, and large amount of penetration/silent penetration to vocal folds/aspiration with pudding.  Strategies did not eliminate repeated silent penetration/aspiration risk.  Safest option is to continue NPO status (with or without minimal pleasure feeding knowing very high risk for silent aspiration) and consider longer term alternative nutrition/hydration placement if plan continues to be restorative vs more comfort care approach with no alternative nutrition/hydration and known high risk for aspiration/respiratory compromise with po intake.  Least hazardous po option, but with known aspiration, is a clear liquid honey thick liquid diet by spoon, repeated swallow-cough-swallow sequence with 1:1 supervision/cues.  Patient is not able to execute swallow strategies independently due to poor recall.  Will defer to MD/family regarding POC wishes.  Education regarding study results was provided to daughter.  MD paged regarding above.  SLP to follow up as indicated.  Barriers to return to prior living situation: Level of assist, cognitive deficits  Recommendations for discharge: ARU if goals remain restorative, SLP Tx  Rationale for recommendations: SLP swallow Tx to provide education on aspiration risk; SLP swallow Tx to trial another period of exercises if goals remain restorative       Entered by: Maria Isabel Enrique 05/28/2019 9:59 AM

## 2019-05-28 NOTE — PROGRESS NOTES
[facility]    Hospitalist Progress Note    Date of Service (when I saw the patient): 05/28/2019    Assessment & Plan   Jayson Leija is a 89 year old male with hx of chronic a-fib/complete heart block s/p PPM on chronic anticoagulation with warfarin who initially presented to [facility] on 5/17/2019 after being found down at home. He was found to have a large right-sided subdural hematoma, and was transferred to [facility] for further evaluation and management     Acute on chronic large right-sided traumatic SDH with cerebral edema following unwitnessed fall  Found down at home by daughter. CT head on arrival showed large right-sided subdural hematoma felt to represent acute on chronic hemorrhage with associated midline shift and mass effect. In the ED, he was given KCentra, vitamin K, and mannitol. Neurosurgery and neurocritical care were consulted on admission, and the decision was made with the family to manage the bleed medically and avoid surgery. He was initiated on a nicardipine infusion for strict blood pressure control. Serial head CTs have been stable without increase in bleed. Family was initially leaning toward strict comfort cares, but since patient's mental status improved, they would now like to pursue restorative cares (short of surgery). He is DNR/DNI  - Residual left-sided weakness, dysphagia, and cognitive impairment, is slowly improving.  - Goal normotension  - 5/20 increased amlodipine from 2.5 to 5. BP well controlled at this time.  - Warfarin has been discontinued indefinitely  - PT/OT/SLP recommending TCU  - Follow up with Neurosurgery in 6 weeks  Failed video swallow study today. Needs G tube placement per speech path   G tube order placed     Suspected dementia with possible delirium  Patient's family reports cognitive decline for several months PTA. He was somnolent and difficult to arouse on admission. His level of consciousness has improved, but he remains  disoriented to situation with short term memory. SLUMS 10/30 per OT.   - On delirium protocol. Hold antipsychotics for now unless patient becomes agitated     Dysphagia due to ICH  He has been evaluated by SLP who has recommended alternative means of nutrition due to observed jocelyne aspiration. Discussed nutrition options with patient and family, and they have decided to pursue tube feeds. Patient will try an NJ tube first, and if he tolerates this (e.g, doesn't pull it out), will likely pursue PEG tube prior to discharge. If he pulls it out and/or decides that does not want a feeding tube, patient and family are open to accepting the risk of aspiration and following the safest possible diet as recommended by SLP. If he tries a modified diet and doesn't want to continue, they are open to focusing on comfort and allowing him to eat for pleasure  - NJ tube placed by IR on 5/20  - tube feeds currently at goal of 60 ml/hr  G tube placement today as above     Hypernatremia  Na 151, down from 156 on 5/20, due to lack of free water intake while NPO  - on 5/20 had D5W for 500 ml, now discontinued.  - increased free water flushes from 100 ml every 4 hours, will like to keep the sodium on the higher side of normal.  Sodium improved today. 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 Suspect pre-renal state due to decreased intake. Improved with IV fluids  - Cr now at baseline at 1.7  today       Mild rhabdomyolysis  CK level on admission 2190, due to prolonged period of being down. Improved appropriately with IV fluids  - Holding PTA pravastatin     Sacral wounds, unstageable  Present on admission. [place] by WOC RN to be multifactorial from fall, friction from scooting, and probable pressure components  - Wound cares in place as per WOC RN     Chronic atrial fibrillation  History of complete heart block s/p PPM  - Warfarin has been discontinued  indefinitely     NSTEMI type 2 due to demand ischemia  Nonsustained ventricular tachycardia  Troponin 0.15 on arrival. Felt to be due to demand ischemia in setting of acute SDH and from being down.  - 14 beat run early AM on 5/22  - Electrolytes unremarkable  - Started on metoprolol 12.5 mg bid  Stable at this time.     Essential hypertension  [PTA: amlodipine 2.5 mg daily]  - Amlodipine has been increased as noted above     DM2 without complications  Diet-controlled. A1c 8.5. Blood sugars were initially elevated to the mid 200-300 range, and he was started on Lantus, but this was discontinued (5/19) due to progressive downward trend in blood sugars.  - severe uncontrolled hyperglycemia to mid 200's to 300's  - will increase to lantus from 20 to  30 units daily today, and increased to high intensity sliding scale     Hypothyroidism  TSH 2.42 on admission  - Continues on PTA levothyroxine 100 mcg daily     FEN: NPO, tube feeds  DVT Prophylaxis: Pneumatic Compression Devices  Code Status: DNR/DNI     Disposition: Expected discharge to TCU tomorrow after G tube placement today      Jacqueline Sun      Interval History   Patient feeling well this morning failed swallow study. Needs G tube placement   -Data reviewed today: I reviewed all new labs and imaging results over the last 24 hours. I personally reviewed no images or EKG's today.    Physical Exam   Temp: 97.5  F (36.4  C) Temp src: Oral BP: 120/69 Pulse: 71 Heart Rate: 70 Resp: 16 SpO2: 99 % O2 Device: None (Room air)    Vitals:    05/26/19 0510 05/27/19 0211 05/28/19 0500   Weight: 76.2 kg (168 lb) 75.3 kg (166 lb) 74.6 kg (164 lb 7.4 oz)     Vital Signs with Ranges  Temp:  [97.3  F (36.3  C)-98.2  F (36.8  C)] 97.5  F (36.4  C)  Pulse:  [69-72] 71  Heart Rate:  [69-70] 70  Resp:  [16-18] 16  BP: (119-147)/(58-74) 120/69  SpO2:  [7 %-100 %] 99 %  I/O last 3 completed shifts:  In: 1415.83 [P.O.:30; I.V.:335.83; NG/GT:360]  Out: 725 [Urine:725]    Gen: Well  nourished, well developed, alert and oriented x 3, NAD  HEENT: Atraumatic, normocephalic  Lungs: Clear to ausculation without wheezes, rhonchi, or rales  Heart: Regular rate and rhythm, no gallops or rubs, no murmurs  GI: Bowel sound normal, no hepatosplenomegaly or masses  Lymph: No lymphadenopathy or edema  Skin: No rashes     Medications     IV fluid REPLACEMENT ONLY         amLODIPine  5 mg Oral or Feeding Tube Daily     insulin aspart  1-12 Units Subcutaneous Q4H     insulin glargine  30 Units Subcutaneous Daily     levothyroxine  100 mcg Oral or Feeding Tube Daily     metoprolol  12.5 mg Per Feeding Tube BID       Data   Recent Labs   Lab 05/28/19  0714 05/27/19  0737 05/26/19  0830   WBC  --   --  9.6   HGB  --   --  10.6*   MCV  --   --  91   PLT  --   --  156    147* 148*   POTASSIUM 3.8 4.1 4.0   CHLORIDE 111* 115* 117*   CO2 28 27 25   BUN 36* 41* 42*   CR 1.71* 2.06* 1.96*   ANIONGAP 5 5 6   [place] 8.7 8.4* 8.4*   * 166* 133*   ALBUMIN  --   --  2.0*       Recent Results (from the past 24 hour(s))   XR Video Swallow w/o Esophagram    Narrative    VIDEO SWALLOWING EVALUATION May 28, 2019 9:21 AM     HISTORY: Dysphagia.    COMPARISON: None.    FLUOROSCOPY TIME: 0.8 minutes.     Number of cine runs: Five.    FINDINGS:    Thin: Not administered.    Nectar: Moderate penetration. Mild hypopharyngeal residue.    Honey: Moderate hypopharyngeal residue. Moderate penetration during  the swallow. Aspiration of residue without spontaneous cough.    Pudding: Azam aspiration without spontaneous cough. Moderate  hypopharyngeal residue.    Semisolid: Not administered.    Solid: Not administered.    This study only includes the cervical esophagus.    RK CHILDRESS MD

## 2019-05-28 NOTE — PROGRESS NOTES
"   05/28/19 0925   General Information   Onset Date 05/17/19   Start of Care Date 05/28/19   Referring Physician Dr. Perrin   Patient Profile Review/OT: Additional Occupational Profile Info See Profile for full history and prior level of function   Patient/Family Goals Statement Did not clearly state wishes, but has repeatedly asked for liquids during swallow Tx.   Swallowing Evaluation Videofluoroscopic evaluation   Behaviorial Observations Alert;Confused   Mode of current nutrition NPO   Respiratory Status Room air   Comments Per neurology \"Jayson Leija is a 89 year old male with Afib (on Coumadin) CKD, DMII, HTN, who was found down by his daughter with left-sided weakness.  Based upon his uncollected mail, the family suspects that he fell approximately 2 days ago.   He presented to the [facility] where he was found to have an acute on chronic right subdural causing compression, mass effect, and subfalcine herniation.  He received KCentra (INR 2.5) and mannitold and was transferred to [facility].\" Pt failed dysphagia screen d/t slurred speech, NPO until SLP evaluation. No prior SLP services per [facility]. RN reports family stating he has been progressively declining over the past few months at home.    VFSS Eval: Radiology   Radiologist Dr. Honeycutt   Views Taken left lateral   Physical Location of Procedure [facility]   VFSS Eval: Thin Liquid Texture Trial   Order of Presentation Did not test given aspiration with other trials   VFSS Eval: Nectar Thick Liquid Texture Trial   Mode of Presentation, [place] spoon   Order of Presentation 3   Preparatory Phase Poor bolus control   Oral Phase, Nectar Premature pharyngeal entry   Pharyngeal Phase, Nectar Delayed swallow reflex   Rosenbek's Penetration Aspiration Scale: Nectar-Thick Liquid Trial Results 8 - contrast passes glottis, visible subglottic residue remains, absent patient response (aspiration)   Diagnostic Statement Swallow delay, weak base of tongue function, curved " epiglottis with decreased inversion, decreased pharyngeal peristalsis, piecemeal swallows, delay into the laryngeal vestibule with jocelyne silent aspiration,  mild-moderate pharyngeal residue, cued multiple coughs off video cleared penetration/aspiration   VFSS Eval: Honey Thick Texture Trial   Mode of Presentation, Honey spoon   Order of Presentation 1, 2, 5   Preparatory Phase Poor bolus control   Oral Phase, Honey Premature pharyngeal entry   Pharyngeal Phase, Honey Delayed swallow reflex   Rosenbek's Penetration Aspiration Scale: Honey Trial Results 8 - contrast passes glottis, visible subglottic residue remains, absent patient response (aspiration)   Diagnostic Statement Swallow delay, weak base of tongue function, curved epiglottis with decreased inversion, decreased pharyngeal peristalsis, piecemeal swallows, 1st trial delay past epiglottis with moderate repeated penetration with residual, 2nd trial delay into the laryngeal vestibule with deep penetration and cued cough cleared intially, but then repeated penetration noted on additional swallows, mild-moderate pharyngeal residue, off video recording patient frankly silently aspirated residue as noted on next view, 3rd trial with chin tuck resulted in delay into the vestibule/deep penetration with residual    VFSS Eval: Puree Solid Texture Trial   Mode of Presentation, Puree spoon   Order of Presentation 4   Preparatory Phase Poor bolus control   Oral Phase, Puree Premature pharyngeal entry   Pharyngeal Phase, Puree Delayed swallow reflex   Rosenbek's Penetration Aspiration Scale: Puree Food Trial Results 8 - contrast passes glottis, visible subglottic residue remains, absent patient response (aspiration)   Diagnostic Statement Swallow delay, weak base of tongue function, curved epiglottis with decreased inversion, decreased pharyngeal peristalsis, piecemeal swallows, delay with large amount of bolus into the laryngeal vestibule with jocelyne silent  penetration/aspiration, moderate pharyngeal residue, cues to cough provided partially cleared aspiration/penetration   Swallow Compensations   Swallow Compensations Supraglottic swallow;Multiple swallow;Reduce amounts;Chin tuck;Effortful swallow  (Strategies were not effective )   Esophageal Phase of Swallow   Patient reports or presents with symptoms of esophageal dysphagia No   Swallow Eval: Clinical Impressions   Skilled Criteria for Therapy Intervention Skilled criteria met.  Treatment indicated.   Functional Assessment Scale (FAS) 1   Dysphagia Outcome Severity Scale (JESUS ALBERTO) Level 1 - JESUS ALBERTO   Treatment Diagnosis severe oral-pharyngeal dysphagia   Diet texture recommendations NPO   Therapy Frequency daily   Predicted Duration of Therapy Intervention (days/wks) 1 week   Anticipated Discharge Disposition inpatient rehabilitation facility   Risks and Benefits of Treatment have been explained. Yes   Patient, family and/or staff in agreement with Plan of Care Yes   Clinical Impression Comments Patient presents with continued severe oral-pharyngeal dysphagia per today's repeat video swallow study.  Deficits include a significant swallow delay, weak base of tongue function, curved epiglottis with decreased inversion, decreased pharyngeal peristalsis, and piecemeal swallows.  Deficits resulted in repeated penetration with residual and eventual silent aspiration of honey thick liquids, jocelyne silent aspiration of nectar thick liquids, and large amount of penetration/silent penetration to vocal folds/aspiration with pudding.  Strategies did not eliminate repeated silent penetration/aspiration risk.  Safest option is to continue NPO status (with or without minimal pleasure feeding knowing very high risk for silent aspiration) and consider longer term alternative nutrition/hydration placement if plan continues to be restorative vs more comfort care approach with no alternative nutrition/hydration and known high risk for  aspiration/respiratory compromise with po intake.  Least hazardous po option, but with known aspiration, is a clear liquid honey thick liquid diet by spoon, repeated swallow-cough-swallow sequence with 1:1 supervision/cues.  Patient is not able to execute swallow strategies independently due to poor recall.  Will defer to MD/family regarding POC wishes.  SLP to follow up as indicated.   Total Evaluation Time   Total Evaluation Time (Minutes) 15

## 2019-05-28 NOTE — PLAN OF CARE
Pt here with right traumatic subdural hematoma. A&O x 4. Generalized weakness. Slight left sided weakness. VSS. Tele 100% V paced. NPO but ok for ice chips given by nurse only. Oral care done. Up with 2 with belt and walker. Turned and repositioned q 2hrs. Using urinal to void but also can be incontinent . Denies pain.  blood sugar 218. Gave 4 units of insulin coverage. Tube feeding running at 30ml/hr, water flush of 100ml q 4hrs . Discharge to ARU pending.

## 2019-05-28 NOTE — PLAN OF CARE
Pt A&O x4, forgetful at times. Left sided weakness, otherwise neuros intact. VSS on RA. Tele 100% V-paced. NPO with 1-10 ice chips per hour, tolerating well. TF running at 45 mL/h, to be turned up at 1130. Up with A2/GB/W. Denies pain. Plan swallow study today. Discharge pending placement.

## 2019-05-28 NOTE — PROGRESS NOTES
Patient is on IR schedule tomorrow 5/29/19 for a gastrostomy tube placement. Labs WNL for procedure. Orders for NPO have been entered. Phone consent was obtained from daughter Brigitte after explaining the procedure, risks and benefits and consent is in IR.     Please contact the IR charge RN at [phone] for estimated time of procedure.     Kyra Leitschuh CNP ([phone])

## 2019-05-28 NOTE — PROGRESS NOTES
Juan [facility] Progress Note  Chart Reviewed, Pt discussed in Interdisciplinary Rounds.   ARU is reviewing but pt would need 24 hour assistance post rehab and family is not sure  About being able to provide this level of assistance post rehab.    Pt has referral out to [facility] and this is pt's first choice.    Intervention:   JUAN MIGUEL contacted Klever in admissions at [facility] and he is currently obtaining authorization for TCU stay.  Pt is having video swallow done today.  Discharge anticipated as soon as this afternoon.    10:14: JUAN MIGUEL received notice from bedside RN that pt has failed swallow study and may have feeding tube placed if   Pt and family choose.  JUAN MIGUEL called and updated Klever in admissions at [facility].    13:18: JUAN MIGUEL spoke with Tami (pt's daughter) regarding ARU vs TCU placement. FAmily would be willing to hire staff for pt post rehab for 14x7 assistance but she feels   That maybe TCU would give them more time to make arrangements for pt to move into an AL or to hire staff. ARU placement declined at this time.  JUAN MIGUEL updated Klever at [facility] with peg placement plans and anticipated discharge on Thursday. Klever indicates tht they are still securing authorization through [employer]   They will anticipate discharge for Thursday.    Plan: Discharge to TCU  Anticipated discharge placement: [facility]  Barriers: feeding tube placement/ insurance authorization  Follow up plan: JUAN MIGUEL continuing.    FINA Watson  [facility]  Phone: [phone]

## 2019-05-28 NOTE — PLAN OF CARE
Discharge Planner OT   Patient plan for discharge: Rehab before home     Current status: pt mod A supine to sit EOB, max A sit to stand, pt was able to amb with mod A of 2 with cues and assist for walker mobility to bathroom, max cues with assist needed to safely turn and complete transfer to toilet, dependent for clothing management and pericares with toileting. Pt amb from bathroom with max A of 1 and cues for walker safety, cues to get closer to chair, turn and hand placement with mod A to complete transfer to sit up in chair.      Barriers to return to prior living situation: Fall risk with history; Lives alone; Stairs to enter and within home     Recommendations for discharge: Acute rehab per plan established by the Occupational Therapist however per SW note discharge anticipated to TCU     Rationale for recommendations: Pt continues to be limited by L UE weakness and incoordination, L side neglect, impaired cognition and safety, pain, and decreased balance; however, making gains with OT intervention. Pt will be able to tolerate 3 hours of therapy daily. Pt's family very supportive. Good motivation and improvements noted from previous sessions.          Entered by: Maritza Barahona 05/28/2019 10:47 AM

## 2019-05-28 NOTE — PLAN OF CARE
Pt here with traumatic R SDH. A&Ox4, forgetful. Neuros LUE weakness. VSS. Tele 100% V-paced. NPO diet. Tube feeding running at goal 60 mL/hr with q4 hr water flushes of 100mL. NJ patent. Up with assist x2, pivot. Denies pain. Incontinent of urine at times. Wounds to back and bottom changed by wound nurse. New IV placed by IV team. Plan hold tube feed at 0000 for PEG tube placement at 0800 tomorrow. Discharge to TCU possibly tomorrow.

## 2019-05-28 NOTE — PLAN OF CARE
Discharge Planner PT   Patient plan for discharge: TCU  Current status:     PT: Pt seated upon arrival. Family present. [place]. Follows commands. Focus on improved STS technique, pt has difficutly scooting fwd in chair, cues and assist required. STS from recliner min A x 2, difficulty with ant wt shift, cues for improved wt through toes, improved hip extension. Pt has tight BLE HS ms, PROM LLE lacking 30 degrees full ext in sitting. STS x 5 during session, continues to requrie min A. Transfer training in room. Pt very slow to ambulate WC > bed with FWW and mi nA x 1 + CGA x 1. Cues for proper stand > sit technique.       Pt demo dec tolerance to gait this day. Reports he did not sleep wel last night.     Pt ambulated at very slow greg 20' x 1, min A x 1 + CGA x 1, unable to obtain full BLE extension due to weakness and ms length, cues for improved ant wt shift adn glut activation. very short step length. Unsteady     SIt > supine mod A x 2. Left supine with alarm on and needs in reach   Barriers to return to prior living situation: A x 2, lives alone  Recommendations for discharge: TCU  Rationale for recommendations: Pt will benefit from continued skilled PT at TCU to improve strength, balance, gait, endurance to improve functional mobility prior to return home. TCU more appropriate as pt lives alone and will not have 24/7 support          Entered by: Cammy Odonnell 05/28/2019 11:54 AM

## 2019-05-28 NOTE — PLAN OF CARE
Pt here with traumatic SDH. A&O X4, forgetful. Neuros LUE mild weakness. VSS on RA. Tele 100% Vpaced. NPO diet, failed video swallow today, start NPO. NJ patent with continuous tube feeding at goal of 60ml/hr with 100ml water flushes q4hr. Wounds to back and coccyx changed by wound nurse. Covered with foam CDI. Turn and reposition q2hr, side to side only. Incontinent of urine, using urinal at times. Up with 2, gb and walker. Denies pain. Plan for PEG tube placement tomorrow with NPO starting at midnight. Discharge pending.

## 2019-05-29 NOTE — PROGRESS NOTES
MD Notification    Notified Person: MD    Notified Person Name: Gael     Notification Date/Time: 5/29/19 06:20    Notification Interaction: Phone answering service    Purpose of Notification: Pt found to have pulled out NJ tube, please advise    Orders Received: OK have NJ out, pt having Gtube placed per IR this AM    Comments:

## 2019-05-29 NOTE — PLAN OF CARE
Discharge Planner SLP   Patient plan for discharge: Did not address.   Current status: Patient was seen for swallow treatment bedside. Patient had g-tube placed this morning still appears to have affects from sedation, but willing to participate. He did recall that his last video swallow study did not go so well. He completed effortful swallows x5 and lingual resistance x5 with fairly good strength mildly decreased ROM for lingual function. Respiratory exercises completed only able to sustain phonation for 3-4 seconds. Session shorter due to fatigue. Recommend: 1. Continue NPO with TF and oral cares.   Barriers to return to prior living situation: Dysphagia and cognition/safety.   Recommendations for discharge: TCU per family.   Rationale for recommendations: Will need on going ST needs for swallowing. Patient is very motivated and has excellent participation. Well below his baseline function.        Entered by: Ivis Rowell 05/29/2019 3:43 PM

## 2019-05-29 NOTE — IR NOTE
Interventional Radiology Intra-procedural Nursing Note    Patient Name: Jayson Leija  Medical Record Number: [id number]  Today's Date: May 29, 2019    Start Time: 0845  End of procedure time: 0905  Procedure: gastrostomy tube placement  Report given to: STEFFI Berman, [facility] [address]  Time pt departs:  0927  : n/a    Other Notes:     Patient arrives to Ir [address]. Identification verified, telephone consent obtained with patient's daughter. Patient was then assisted onto procedure table, positioned safely and connected to monitoring equipment. Abdomen was prepped and patient draped under sterile technique.     Versed 1mg IV  Fentanyl 50mcg IV    Patient tolerated procedure well. VSS. Patient alert, respirations regular and unlabored, no c/o pain at this time.   Gastrostomy site is c/d/i, new dressing applied.  Patient transferred back to [facility] in stable condition accompanied by transport    Marj Sheikh RN on 5/29/2019 at 9:29 AM

## 2019-05-29 NOTE — PLAN OF CARE
Discharge Planner PT   Patient plan for discharge: TCU  Current status: Per OT, pt had difficulty with safe mobility and command following when up today. Pt continues to report fatigue and grogginess at start of PT. Tx limited to supine exercises and ROM today.  Barriers to return to prior living situation: A x 2, lives alone  Recommendations for discharge: TCU  Rationale for recommendations: Pt will benefit from continued skilled PT at TCU to improve strength, balance, gait, endurance to improve functional mobility prior to return home. TCU more appropriate as pt lives alone and will not have 24/7 support          Entered by: Monty Nair 05/29/2019 4:20 PM

## 2019-05-29 NOTE — PROGRESS NOTES
[facility]    Hospitalist Progress Note    Date of Service (when I saw the patient): 05/29/2019    Assessment & Plan   Jayson Leija is a 89 year old male with hx of chronic a-fib/complete heart block s/p PPM on chronic anticoagulation with warfarin who initially presented to [facility] on 5/17/2019 after being found down at home. He was found to have a large right-sided subdural hematoma, and was transferred to [facility] for further evaluation and management     Acute on chronic large right-sided traumatic SDH with cerebral edema following unwitnessed fall  Found down at home by daughter. CT head on arrival showed large right-sided subdural hematoma felt to represent acute on chronic hemorrhage with associated midline shift and mass effect. In the ED, he was given KCentra, vitamin K, and mannitol. Neurosurgery and neurocritical care were consulted on admission, and the decision was made with the family to manage the bleed medically and avoid surgery. He was initiated on a nicardipine infusion for strict blood pressure control. Serial head CTs have been stable without increase in bleed. Family was initially leaning toward strict comfort cares, but since patient's mental status improved, they would now like to pursue restorative cares (short of surgery). He is DNR/DNI  - Residual left-sided weakness, dysphagia, and cognitive impairment, is slowly improving.  - Goal normotension  - 5/20 increased amlodipine from 2.5 to 5. BP well controlled at this time.  - Warfarin has been discontinued indefinitely  - PT/OT/SLP recommending TCU  - Follow up with Neurosurgery in 6 weeks  Failed video swallow study today. Needs G tube placement per speech path   G tube placed today by IR     Suspected dementia with possible delirium  Patient's family reports cognitive decline for several months PTA. He was somnolent and difficult to arouse on admission. His level of consciousness has improved, but he remains  disoriented to situation with short term memory. SLUMS 10/30 per OT.   - On delirium protocol. Hold antipsychotics for now unless patient becomes agitated     Dysphagia due to ICH  He has been evaluated by SLP who has recommended alternative means of nutrition due to observed jocelyne aspiration. Discussed nutrition options with patient and family, and they have decided to pursue tube feeds. Patient will try an NJ tube first, and if he tolerates this (e.g, doesn't pull it out), will likely pursue PEG tube prior to discharge. If he pulls it out and/or decides that does not want a feeding tube, patient and family are open to accepting the risk of aspiration and following the safest possible diet as recommended by SLP. If he tries a modified diet and doesn't want to continue, they are open to focusing on comfort and allowing him to eat for pleasure  - NJ tube placed by IR on 5/20  - tube feeds currently at goal of 60 ml/hr  G tube placement today as above     Hypernatremia  Na 151, down from 156 on 5/20, due to lack of free water intake while NPO  - on 5/20 had D5W for 500 ml, now discontinued.  - increased free water flushes from 100 ml every 4 hours, will like to keep the sodium on the higher side of normal.  Sodium improved today. 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 Suspect pre-renal state due to decreased intake. Improved with IV fluids  - Cr now at baseline at 1.74  today       Mild rhabdomyolysis  CK level on admission 2190, due to prolonged period of being down. Improved appropriately with IV fluids  - Holding PTA pravastatin     Sacral wounds, unstageable  Present on admission. [place] by WOC RN to be multifactorial from fall, friction from scooting, and probable pressure components  - Wound cares in place as per WOC RN     Chronic atrial fibrillation  History of complete heart block s/p PPM  - Warfarin has been discontinued  indefinitely     NSTEMI type 2 due to demand ischemia  Nonsustained ventricular tachycardia  Troponin 0.15 on arrival. Felt to be due to demand ischemia in setting of acute SDH and from being down.  - 14 beat run early AM on 5/22  - Electrolytes unremarkable  - Started on metoprolol 12.5 mg bid  Stable at this time.     Essential hypertension  [PTA: amlodipine 2.5 mg daily]  - Amlodipine has been increased as noted above     DM2 without complications  Diet-controlled. A1c 8.5. Blood sugars were initially elevated to the mid 200-300 range, and he was started on Lantus, but this was discontinued (5/19) due to progressive downward trend in blood sugars.  - severe uncontrolled hyperglycemia to mid 200's to 300's  -  increased  lantus to  30 units daily , and increased to high intensity sliding scale     Hypothyroidism  TSH 2.42 on admission  - Continues on PTA levothyroxine 100 mcg daily     FEN: NPO, tube feeds  DVT Prophylaxis: Pneumatic Compression Devices  Code Status: DNR/DNI     Disposition: Expected discharge to TCU tomorrow       Jacqueline Sun      Interval History       somnolent from G tube placement earlier . Resting comfortably in bed     -Data reviewed today: I reviewed all new labs and imaging results over the last 24 hours. I personally reviewed no images or EKG's today.    Physical Exam   Temp: 97.9  F (36.6  C) Temp src: Oral BP: 145/69 Pulse: 69 Heart Rate: 71 Resp: 18 SpO2: 99 % O2 Device: None (Room air) Oxygen Delivery: 2 LPM  Vitals:    05/27/19 0211 05/28/19 0500 05/29/19 0704   Weight: 75.3 kg (166 lb) 74.6 kg (164 lb 7.4 oz) 75.3 kg (166 lb 0.1 oz)     Vital Signs with Ranges  Temp:  [97.5  F (36.4  C)-98.9  F (37.2  C)] 97.9  F (36.6  C)  Pulse:  [69-72] 69  Heart Rate:  [69-72] 71  Resp:  [12-32] 18  BP: (124-181)/() 145/69  SpO2:  [90 %-99 %] 99 %  I/O last 3 completed shifts:  In: 440 [NG/GT:440]  Out: 600 [Urine:600]    Gen: Well nourished, well developed, alert and oriented x 3,  NAD  HEENT: Atraumatic, normocephalic  Lungs: Clear to ausculation without wheezes, rhonchi, or rales  Heart: Regular rate and rhythm, no gallops or rubs, no murmurs  GI: Bowel sound normal, no hepatosplenomegaly or masses  Lymph: No lymphadenopathy or edema  Skin: No rashes     Medications     IV fluid REPLACEMENT ONLY       - MEDICATION INSTRUCTIONS -         amLODIPine  5 mg Oral or Feeding Tube Daily     insulin aspart  1-12 Units Subcutaneous Q4H     insulin glargine  30 Units Subcutaneous Daily     levothyroxine  100 mcg Oral or Feeding Tube Daily     metoprolol  12.5 mg Per Feeding Tube BID     sodium chloride (PF)  3 mL Intracatheter Q8H       Data   Recent Labs   Lab 05/29/19  0600 05/28/19  0714 05/27/19  0737 05/26/19  0830   WBC 10.7  --   --  9.6   HGB 10.3*  --   --  10.6*   MCV 90  --   --  91     --   --  156   INR 1.08  --   --   --     144 147* 148*   POTASSIUM 3.9 3.8 4.1 4.0   CHLORIDE 108 111* 115* 117*   CO2 27 28 27 25   BUN 31* 36* 41* 42*   CR 1.74* 1.71* 2.06* 1.96*   ANIONGAP 5 5 5 6   [place] 8.1* 8.7 8.4* 8.4*   * 203* 166* 133*   ALBUMIN  --   --   --  2.0*       No results found for this or any previous visit (from the past 24 hour(s)).

## 2019-05-29 NOTE — PLAN OF CARE
Discharge Planner OT   Patient plan for discharge: Rehab before home     Current status: pt awake and agreed to working with OT for OOB activity, pt max A with supine to sit EOB with cues and assist to complete log roll, max A of 1 mod A of 1 to complete sit to stand pt had Left lateral lean, max cues with assist needed to weight shift to midline, pt follow 1 step direction, not safe to complete transfer to chair with use of FWW and assist of 2 at this time, max A to return to supine and dependent of 2 to reposition up in bed for comfort.      Barriers to return to prior living situation: Fall risk with history; Lives alone; Stairs to enter and within home     Recommendations for discharge: Acute rehab per plan established by the Occupational Therapist however per SW note discharge anticipated to TCU    Rationale for recommendations: Pt continues to be limited by L UE weakness and incoordination, L side neglect, impaired cognition and safety, pain, and decreased balance; however, making gains with OT intervention. Pt will be able to tolerate 3 hours of therapy daily. Pt's family very supportive. Good motivation and improvements noted from previous sessions.          Entered by: Maritza Barahona 05/29/2019 3:27 PM

## 2019-05-29 NOTE — PROCEDURES
RADIOLOGY POST PROCEDURE NOTE w/ SEDATION  Patient name: Jayson Leija  MRN: [id number]  : 1929    Pre-procedure diagnosis: needs long term feeds  Post-procedure diagnosis: Same    Procedure Date/Time: May 29, 2019  9:06 AM  Procedure: percutaneous gastrostomy tube placement.  Estimated blood loss: None  Specimen(s) collected with description: none    I determined this patient to be an appropriate candidate for the planned sedation and procedure and reassessed the patient IMMEDIATELY PRIOR to sedation and procedure.     The patient tolerated the procedure well with no immediate complications.  Significant findings:none    See imaging dictation for procedural details.    Provider name: Bijal Montoya  Assistant(s):None

## 2019-05-29 NOTE — PLAN OF CARE
Pt here with traumatic R SDH. A&Ox4, intermittently disoriented to situation overnight, easily forgetful. Neuros intact ex generalized weakness L>R.  VSS on RA. Denies pain. Tele 100% V-paced. Strict NPO. Tube feed via NJ stopped at midnight in preparation for G tube placement at 0800 this AM. Pt found to have pulled out most of NJ tube this AM, per MD OK to pull rest of tube and keep out. D10 fluids started at rate of TF per orders for hypoglycemia prevention. Q4 BG checks covered with sliding scale insulin as needed. Checks 192, 224, 132. Up with assist x2, pivot. Repositioned Q2 side to side. Incontinent of urine at times or uses urinal. Wounds to coccyx and back with foam dressing, CDI. Scabbing wound to R knee MADELEINE. Plan for G tube placement per IR this AM and discharge to TCU following, most likely Thursday. Continue to monitor.

## 2019-05-29 NOTE — PLAN OF CARE
Pt here with traumatic R SDH. A&Ox4, forgetful. Neuros LUE weakness. VSS. Tele 100% V-paced. NPO diet. PEG tube placed this AM. Tube feed running at 30mL/hr, increase to 40 mL/hr at 2000. Q4 hr 100 mL water flushes. Up with assist x2 and lonnie steady. Denies pain. Turn and reposition every 2 hours, side to side only. Incontinent of urine. Wounds to back and bottom covered, foam dressings intact. Q4 hr blood sugar checks with sliding scale insulin. Discharge to TCU possibly tomorrow, awaiting insurance authorization.

## 2019-05-29 NOTE — PLAN OF CARE
OT:  pt  just back from procedure feeding tube placement, groggy, not able to safely participate with OOB activity at this time.

## 2019-05-30 NOTE — PLAN OF CARE
Discharge Planner OT   Patient plan for discharge: Rehab before home     Current status: pt alert, completed supine to sit EOB with max cues and max A, pt required max A and cues to maintain upright sitting balance as pt has LOB to left, right and posteriorly. Pt was not able to attain standing with max A of 2 today, max A of 2 to complete sit to supine and dependent of 2 to reposition in bed for comfort.    Barriers to return to prior living situation: Fall risk with history; Lives alone; Stairs to enter and within home     Recommendations for discharge: TCU per plan established by the Occupational Therapist     Rationale for recommendations: Pt continues to be limited by L UE weakness and incoordination, L side neglect, impaired cognition and safety, pain, and decreased balance; however, making gains with OT intervention. Pt will be able to tolerate 3 hours of therapy daily. Pt's family very supportive. Good motivation and improvements noted from previous sessions.          Entered by: Maritza Barahona 05/30/2019 10:06 AM

## 2019-05-30 NOTE — PROVIDER NOTIFICATION
Alert Dr. Sun to patient right knee discomfort.  right knee hurting,gave Tylenol & ice packing, please advise.  Will await additional orders.

## 2019-05-30 NOTE — PROGRESS NOTES
FLIP    I:  SW received update patient's discharge is anticipated for today. JUAN MIGUEL spoke with [facility] admissions, Aditi, who stated they have not secured [employer] auth that she is aware, but will check and let SW know. Pt will need stretcher for transport.    P: Continue to assist as needed.    FINA Oliver    UPDATE@9:59: [facility] stated they proceeded with obtaining auth earlier this week but were told by [employer] another TCU had requested authorization. JUAN MIGUEL reviewed previous notes which appear to show [facility] had accepted patient. JUAN MIGUEL spoke with Corin, admissions at [facility], who stated their auth was obtained several days ago and that auth would now be null. JUAN MIGUEL called [facility] back asking them to re-send for auth and also faxed updated information which they said they would do. Per [facility] admissions they do have shared male TCU beds available today. Awaiting call back from [facility]    UPDATE@1154: Per Leanne, admissions director at [facility], Vero has told them [facility] has to call [employer] to cancel their request (even though the original auth was only good for 48 hours). JUAN MIGUEL spoke with Corin, admissions at [facility], who stated she will email her billing person to take care of this, however, it is unknown how long it will take for that to occur. JUAN MIGUEL will need to update [facility] with this information.

## 2019-05-30 NOTE — PROGRESS NOTES
[facility]    Hospitalist Progress Note    Date of Service (when I saw the patient): 05/30/2019    Assessment & Plan   Jayson Leija is a 89 year old male with hx of chronic a-fib/complete heart block s/p PPM on chronic anticoagulation with warfarin who initially presented to [facility] on 5/17/2019 after being found down at home. He was found to have a large right-sided subdural hematoma, and was transferred to [facility] for further evaluation and management     Acute on chronic large right-sided traumatic SDH with cerebral edema following unwitnessed fall  Found down at home by daughter. CT head on arrival showed large right-sided subdural hematoma felt to represent acute on chronic hemorrhage with associated midline shift and mass effect. In the ED, he was given KCentra, vitamin K, and mannitol. Neurosurgery and neurocritical care were consulted on admission, and the decision was made with the family to manage the bleed medically and avoid surgery. He was initiated on a nicardipine infusion for strict blood pressure control. Serial head CTs have been stable without increase in bleed. Family was initially leaning toward strict comfort cares, but since patient's mental status improved, they would now like to pursue restorative cares (short of surgery). He is DNR/DNI  - Residual left-sided weakness, dysphagia, and cognitive impairment, is slowly improving.  - Goal normotension  - 5/20 increased amlodipine from 2.5 to 5. BP well controlled at this time.  - Warfarin has been discontinued indefinitely  - PT/OT/SLP recommending TCU  - Follow up with Neurosurgery in 6 weeks  Failed video swallow study   G tube placed  by IR on 5/29. Tolerating tube feeds today     Suspected dementia with possible delirium  Patient's family reports cognitive decline for several months PTA. He was somnolent and difficult to arouse on admission. His level of consciousness has improved, but he remains disoriented to  situation with short term memory. SLUMS 10/30 per OT.   - On delirium protocol. Hold antipsychotics for now unless patient becomes agitated     Dysphagia due to ICH  He has been evaluated by SLP who has recommended alternative means of nutrition due to observed jocelyne aspiration. Discussed nutrition options with patient and family, and they have decided to pursue tube feeds. Patient will try an NJ tube first, and if he tolerates this (e.g, doesn't pull it out), will likely pursue PEG tube prior to discharge. If he pulls it out and/or decides that does not want a feeding tube, patient and family are open to accepting the risk of aspiration and following the safest possible diet as recommended by SLP. If he tries a modified diet and doesn't want to continue, they are open to focusing on comfort and allowing him to eat for pleasure  - NJ tube placed by IR on 5/20  - tube feeds currently at goal of 60 ml/hr  G tube placement done      Hypernatremia  Na 151, down from 156 on 5/20, due to lack of free water intake while NPO  - on 5/20 had D5W for 500 ml, now discontinued.  - increased free water flushes from 100 ml every 4 hours, will like to keep the sodium on the higher side of normal.  Sodium improved today. 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 Suspect pre-renal state due to decreased intake. Improved with IV fluids  - Cr now at baseline at 1.74  today       Mild rhabdomyolysis  CK level on admission 2190, due to prolonged period of being down. Improved appropriately with IV fluids  - Holding PTA pravastatin     Sacral wounds, unstageable  Present on admission. [place] by WOC RN to be multifactorial from fall, friction from scooting, and probable pressure components  - Wound cares in place as per WOC RN     Chronic atrial fibrillation  History of complete heart block s/p PPM  - Warfarin has been discontinued indefinitely     NSTEMI type 2 due  to demand ischemia  Nonsustained ventricular tachycardia  Troponin 0.15 on arrival. Felt to be due to demand ischemia in setting of acute SDH and from being down.  - 14 beat run early AM on 5/22  - Electrolytes unremarkable  - Started on metoprolol 12.5 mg bid  Stable at this time.     Essential hypertension  [PTA: amlodipine 2.5 mg daily]  - Amlodipine has been increased as noted above     DM2 without complications  Diet-controlled. A1c 8.5. Blood sugars were initially elevated to the mid 200-300 range, and he was started on Lantus, but this was discontinued (5/19) due to progressive downward trend in blood sugars.  - severe uncontrolled hyperglycemia to mid 200's to 300's  -  increased  lantus to  30 units daily , and increased to high intensity sliding scale     Hypothyroidism  TSH 2.42 on admission  - Continues on PTA levothyroxine 100 mcg daily     FEN: NPO, tube feeds  DVT Prophylaxis: Pneumatic Compression Devices  Code Status: DNR/DNI     Disposition: Expected discharge to TCU when paper work is done       Jacqueline Sun      Interval History      . Resting comfortably in bed . No new issues     -Data reviewed today: I reviewed all new labs and imaging results over the last 24 hours. I personally reviewed no images or EKG's today.    Physical Exam   Temp: 98.4  F (36.9  C) Temp src: Oral BP: 112/55 Pulse: 71 Heart Rate: 69 Resp: 16 SpO2: 97 % O2 Device: None (Room air)    Vitals:    05/28/19 0500 05/29/19 0704 05/30/19 0421   Weight: 74.6 kg (164 lb 7.4 oz) 75.3 kg (166 lb 0.1 oz) 75.8 kg (167 lb)     Vital Signs with Ranges  Temp:  [97.3  F (36.3  C)-100.2  F (37.9  C)] 98.4  F (36.9  C)  Pulse:  [71] 71  Heart Rate:  [69-72] 69  Resp:  [16-18] 16  BP: (112-155)/(55-70) 112/55  SpO2:  [97 %-99 %] 97 %  I/O last 3 completed shifts:  In: 640 [I.V.:420; NG/GT:220]  Out: -     Gen: Well nourished, well developed, alert and oriented x 3, NAD  HEENT: Atraumatic, normocephalic  Lungs: Clear to ausculation without  wheezes, rhonchi, or rales  Heart: Regular rate and rhythm, no gallops or rubs, no murmurs  GI: Bowel sound normal, no hepatosplenomegaly or masses  Lymph: No lymphadenopathy or edema  Skin: No rashes     Medications     IV fluid REPLACEMENT ONLY 60 mL/hr at 05/29/19 1455     - MEDICATION INSTRUCTIONS -         amLODIPine  5 mg Oral or Feeding Tube Daily     insulin aspart  1-12 Units Subcutaneous Q4H     insulin glargine  30 Units Subcutaneous Daily     levothyroxine  100 mcg Oral or Feeding Tube Daily     metoprolol  12.5 mg Per Feeding Tube BID     sodium chloride (PF)  3 mL Intracatheter Q8H       Data   Recent Labs   Lab 05/29/19  0600 05/28/19  0714 05/27/19  0737 05/26/19  0830   WBC 10.7  --   --  9.6   HGB 10.3*  --   --  10.6*   MCV 90  --   --  91     --   --  156   INR 1.08  --   --   --     144 147* 148*   POTASSIUM 3.9 3.8 4.1 4.0   CHLORIDE 108 111* 115* 117*   CO2 27 28 27 25   BUN 31* 36* 41* 42*   CR 1.74* 1.71* 2.06* 1.96*   ANIONGAP 5 5 5 6   [place] 8.1* 8.7 8.4* 8.4*   * 203* 166* 133*   ALBUMIN  --   --   --  2.0*       No results found for this or any previous visit (from the past 24 hour(s)).

## 2019-05-30 NOTE — PLAN OF CARE
PT: Attempted to see patient for PT session. Family present in the room and state pt's R knee is really bothering him and is painful to move and at rest. Adjusted pillows under the knee for more support and recommended continuing with ice on and off as he has been this AM. Adjusted pt's position in bed to be more comfortable. Pt declined any other activity with PT today.

## 2019-05-30 NOTE — PROGRESS NOTES
CLINICAL NUTRITION SERVICES - REASSESSMENT NOTE      RECOMMENDATIONS FOR MD/PROVIDER TO ORDER:   Recommend increase H2O flushes to 175 mL q 4 hr for total fluid (TF + flushes) = 2150 mL/.day     Malnutrition (5/20):    % Weight Loss:  > 2% in 1 week (severe malnutrition)  % Intake:  </= 50% for >/= 5 days (severe malnutrition)  Subcutaneous Fat Loss:  None observed  Muscle Loss:  Temporal region mild depletion and Clavicle bone region mild depletion  Fluid Retention:  None noted     Malnutrition Diagnosis: Severe malnutrition  In Context of:  Acute illness or injury  Environmental or social circumstances        EVALUATION OF PROGRESS TOWARD GOALS   Diet:  NPO    Nutrition Support:   Isosource 1.5 at 60 mL/hr (reached goal 0400 this AM): 2160 kcal (29 kcal/kg), 98 g protein (1.3 g/kg), 253 g CHO, 22 g fiber, 1094 mL H2O   Free Water Flush: 100mL Q4hrs     Intake/Tolerance:  Tolerating without issues. Last documented BM was 5/28.      ASSESSED NUTRITION NEEDS:  Dosing Weight 74.6 kg   Estimated Energy Needs: 6512-4858 kcals (25-30 Kcal/Kg) -  maintenance  Estimated Protein Needs:  grams protein (1.2-1.5 g pro/Kg) -  hypercatabolism with acute illness  Estimated Fluid Needs: 0674-2070 mL (1 mL/Kcal) - maintenance      NEW FINDINGS:    5/29: G-tube placed in IR    Fairly stable weight  Vitals:    05/17/19 1815 05/22/19 0400 05/26/19 0510 05/27/19 0211   Weight: 74.6 kg (164 lb 7.4 oz) 75.3 kg (166 lb) 76.2 kg (168 lb) 75.3 kg (166 lb)    05/28/19 0500 05/29/19 0704 05/30/19 0421   Weight: 74.6 kg (164 lb 7.4 oz) 75.3 kg (166 lb 0.1 oz) 75.8 kg (167 lb)         Previous Goals:   Enteral nutrition to meet 90%-110% of assessed needs   Evaluation: Met    Previous Nutrition Diagnosis:   Inadequate protein-energy intake related to reliant on enteral nutrition to meet assessed needs as evidenced by TF not currently running at goal rate   Evaluation: Resolved      CURRENT NUTRITION DIAGNOSIS  No nutrition diagnosis  identified at this time     INTERVENTIONS  Recommendations / Nutrition Prescription  Continue current TF  Recommend increase H2O flushes to 175 mL q 4 hr for total fluid (TF + flushes) = 2150 mL/.day    Implementation  No interventions at this time    Goals  TF will continue to meet assessed needs      MONITORING AND EVALUATION:  Progress towards goals will be monitored and evaluated per protocol and Practice Guidelines    Guillermina Marrero RD  Pager [phone] (M-F)            [phone] ([facility])

## 2019-05-30 NOTE — PLAN OF CARE
Right SDH. Disoriented to time/place/situation intermittent. Neuros/CMS - left side weak grasp/drift/moderate lower extremity weak/poor dorsi plantar flexion; resting between cares.  VSS. Tele 100 percent v paced with BBB.  NPO with tube feeding at goal/oral cares provided. Bedrest with 2 assist/lift assisted/turn & reposition often/HOb 30 degrees. Incontinent & continent of bladder.  Awaiting insurance/bed placement TCU.  Wound care to back & sacral wounds/mepilex intact. Scab on right knee.  Right knee pain with movement/tylenol provided/cold packs helpful. Seizure precautions maintained.

## 2019-05-30 NOTE — PLAN OF CARE
"Discharge Planner SLP   Patient plan for discharge: Did not state  Current status: SLP: Improved recall of swallowing exercises. Completed CTAR x20, jessenia (unable to fully complete) x4 and effortful swallow x4. Continued delayed initiation and coughing with oral swabs and ice chips x3. Written instructions on white board to target effortful swallow \"swallow a golf ball\" x10 and chin tuck against resistance (rolled up wash cloth under chin and cue pt to push chin into wash cloth as hard as possible, hold for 5 seconds and complete x20 if pt able to tolerate). Continue NPO with TF; use ice water to complete oral cares.    Barriers to return to prior living situation: Dysphagia  Recommendations for discharge: TCU  Rationale for recommendations: Continue ST daily for swallowing goals       Entered by: Nidhi Collins 05/30/2019 1:48 PM      "

## 2019-05-30 NOTE — PLAN OF CARE
Pt here with R sided traumatic SDH. A&Ox4, disoriented to situation at times, very forgetful. Neuros intact ex LUE weakness 3-4/5 and generalized weakness L>R. VSS on RA. Tele V-paced.  Strict NPO. TF running at goal rate 60mL/hour reached @ 0400. G-tube patent, dressing CDI. Q4 water flushes 100mL. Incontinent of bowel and bladder. Mepilex to coccyx and back CDI. A/2, turn and reposition Q2 side to side only. Q4 BG checks with sliding scale given, checks 172, 184, 222. Denies pain. Plan to discharge to TCU once received insurance authorization, possibly today. Continue to monitor.

## 2019-05-31 NOTE — PLAN OF CARE
Pt alert, oriented x1-2. VSS. Tele 100% V paced. Neuros with L hemiparesis and L sided neglect. SONYA E strength 2-4/5, LLE 2/5. Had repeat head CT: stable SDH. Lung sounds clear/diminished. Incontinent of urine. Tube feed infusing at goal rate. Wounds x2 on spine and coccyx stable with CDI foam dressings. R knee warm, swollen and painful with repositioning - MD notified and ortho consult placed. Repositioned q 1-2 hrs.D/C to TCU pending placement.

## 2019-05-31 NOTE — PLAN OF CARE
"Discharge Planner SLP   Patient plan for discharge: Family reported TCU  Current status: SLP: Pt alert with family present. Education on VFSS, risk of aspiration, swallow exercises and SLP POC reviewed with pt's daughters. Pt completed effortful swallow x10 and CTAR x15. Increased fatigued noted today. Written instructions on white board to target effortful swallow \"swallow a golf ball\" x10 and chin tuck against resistance (rolled up wash cloth under chin and cue pt to push chin into wash cloth as hard as possible, hold for 5 seconds and complete x20 if pt able to tolerate). Continue NPO with TF; use ice water to complete oral cares.  Barriers to return to prior living situation: Dysphagia  Recommendations for discharge: TCU  Rationale for recommendations: Continue ST for dysphagia management. Consider repeat VFSS in 4-6 weeks pending progress       Entered by: Nidhi Collins 05/31/2019 1:46 PM      "

## 2019-05-31 NOTE — PROGRESS NOTES
Preliminary Video EEG impression on May 31, 2019  1st 40 minutes    Full report to follow. Please look in inpatient chart, under procedure section.     This EEG is abnormal due to the presences of continuous theta slowing with maximum slowing noted in the right hemisphere.  There is sharply contoured theta activity with maximum negativity at T4 rarely noted early on in the record.  We will monitor this activity to make sure these are not seizures or epileptiform discharges.  The left hemisphere does have a 8 to 9 Hz parieto-occipital rhythm.  There is an asymmetry with maximum slowing noted on the right.  These findings are consistent with a mild encephalopathy with cortical lesion in the right hemisphere, this is consistent with patient's history of subdural hematoma on the right.  No electrographic seizures are seen in the record thus far.  If events of interest are noted, please press the event button and complete behavioral testing (ROAR testing) if possible. Clinical correlation is advised.     Sharon Orourke MD  Staff Epilepsy Neurologist   [phone]

## 2019-05-31 NOTE — PROGRESS NOTES
[facility]  LTV  day1 started at 16:22.   Ordered by Johanne Martinez MD.  Video Observation initiated, patient informed.     Miriam Butts  Pt gave me consent to do video EEG

## 2019-05-31 NOTE — PROGRESS NOTES
"CM    I: JUAN MIGUEL left VM for [facility] admissions regarding [employer] authorization. Awaiting call back.    P: Continue to assist as needed.    FINA Oliver    UPDATE@0702: [facility] has received authorization, however patient is not medically ready for discharge. JUAN MIGUEL left VM for [facility], asking if they could hold bed until tomorrow.    UPDATE@4850: [facility] leaves a VM stating they are very disappointed patient is not ready for discharge, as they state they have spent many hours since 5/16 on this patient. Per Klever in admissions, they \"can only sit on this bed until tomorrow\".  "

## 2019-05-31 NOTE — PLAN OF CARE
Patient A&0x1. Sleeping between cares. Bedrest. Turned q 2 hrs. Neuros remain unchanged. R gaze preference noted.  Left upper and lower extremities are weak.  Trace edema noted in L hand and foot. Pronator drift noted on L.  Wound on mid back and coccyx have small amount of yellow drainage. Dressing changed on both of these sites. TF running at 60ml/hr. Tele 100% V pacced. C/o knee pain with movement.

## 2019-05-31 NOTE — PLAN OF CARE
Pt d/o to place, time. Left strength improving. VSS, Tele 100% V-paced. C/o knee pain with movement. NPO, TF @ 60 ml/hr through G-tube. Foam dressings on mid spine and coccyx pressure injuries CDI. Incontinent, turned and repositioned q2h. R knee scab open to air. Discharge to TCU pending.

## 2019-05-31 NOTE — PROGRESS NOTES
[facility]    Hospitalist Progress Note    Date of Service (when I saw the patient): 05/31/2019    Assessment & Plan   Jayson Leija is a 89 year old male with hx of chronic a-fib/complete heart block s/p PPM on chronic anticoagulation with warfarin who initially presented to [facility] on 5/17/2019 after being found down at home. 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 CT head on arrival showed large right-sided subdural hematoma felt to represent acute on chronic hemorrhage with associated midline shift and mass effect. In the ED, he was given KCentra, vitamin K, and mannitol. Neurosurgery and neurocritical care were consulted on admission, and the decision was made with the family to manage the bleed medically and avoid surgery. He was initiated on a nicardipine infusion for strict blood pressure control. Serial head CTs have been stable without increase in bleed. Family was initially leaning toward strict comfort cares, but since patient's mental status improved, they would now like to pursue restorative cares (short of surgery). He is DNR/DNI  - Residual left-sided weakness, dysphagia, and cognitive impairment, is slowly improving.  - Goal normotension  - 5/20 increased amlodipine from 2.5 to 5. BP well controlled at this time.  - Warfarin has been discontinued indefinitely  - PT/OT/SLP recommending TCU  - Follow up with Neurosurgery in 6 weeks  Failed video swallow study   G tube placed  by IR on 5/29. Tolerating tube feeds   concern for mild left sided neglect CT head w/o contrast ordered today     RIGHT KNEE PAIN:  Right knee is swollen and warm to touch with effusion on exam   Ortho consult placed today     Suspected dementia with possible delirium  Patient's family  reports cognitive decline for several months PTA. He was somnolent and difficult to arouse on admission. His level of consciousness has improved, but he remains disoriented to situation with short term memory. SLUMS 10/30 per OT.   - On delirium protocol. Hold antipsychotics for now unless patient becomes agitated     Dysphagia due to ICH  He has been evaluated by SLP who has recommended alternative means of nutrition due to observed jocelyne aspiration. Discussed nutrition options with patient and family, and they have decided to pursue tube feeds. Patient will try an NJ tube first, and if he tolerates this (e.g, doesn't pull it out), will likely pursue PEG tube prior to discharge. If he pulls it out and/or decides that does not want a feeding tube, patient and family are open to accepting the risk of aspiration and following the safest possible diet as recommended by SLP. If he tries a modified diet and doesn't want to continue, they are open to focusing on comfort and allowing him to eat for pleasure  - NJ tube placed by IR on 5/20  - tube feeds currently at goal of 60 ml/hr  G tube placement done by ir   Severe malnutrition/In Context of:  Acute illness or injury                                               Environmental or social circumstances                                               % Weight Loss:  > 2% in 1 week (severe malnutrition)                                               % Intake:  </= 50% for >/= 5 days (severe malnutrition)                                               Muscle Loss:  Temporal region mild depletion and Clavicle bone region  mild depletion  On tube feeds now      Hypernatremia  Na 151, down from 156 on 5/20, due to lack of free water intake while NPO  - on 5/20 had D5W for 500 ml, now discontinued.  - increased free water flushes from 100 ml every 4 hours, will like to keep the sodium on the higher side of normal.  Sodium improved today. 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 Suspect pre-renal state due to decreased intake. Improved with IV fluids  - Cr now at baseline at 1.74       Mild rhabdomyolysis  CK level on admission 2190, due to prolonged period of being down. Improved appropriately with IV fluids  - Holding PTA pravastatin     Sacral wounds, unstageable  Present on admission. [place] by WOC RN to be multifactorial from fall, friction from scooting, and probable pressure components  - Wound cares in place as per WOC RN     Chronic atrial fibrillation  History of complete heart block s/p PPM  - Warfarin has been discontinued indefinitely     NSTEMI type 2 due to demand ischemia  Nonsustained ventricular tachycardia  Troponin 0.15 on arrival. Felt to be due to demand ischemia in setting of acute SDH and from being down.  - 14 beat run early AM on 5/22  - Electrolytes unremarkable  - Started on metoprolol 12.5 mg bid  Stable at this time.     Essential hypertension  [PTA: amlodipine 2.5 mg daily]  - Amlodipine has been increased as noted above     DM2 without complications  Diet-controlled. A1c 8.5. Blood sugars were initially elevated to the mid 200-300 range, and he was started on Lantus, but this was discontinued (5/19) due to progressive downward trend in blood sugars.  - severe uncontrolled hyperglycemia to mid 200's to 300's  -  increased  lantus to  40 units daily , and increased to high intensity sliding scale     Hypothyroidism  TSH 2.42 on admission  - Continues on PTA levothyroxine 100 mcg daily     FEN: NPO, tube feeds  DVT Prophylaxis: Pneumatic Compression Devices  Code Status: DNR/DNI     Disposition: Expected discharge to TCU when paper work is done   Discussed with bedside RN, patient and NEurology today       Jacqueline Sun      Interval History      . Resting comfortably in bed . Concern for mild left sided neglect. Ct head ordered by Neurology . C/o right knee pain     -Data reviewed today: I  reviewed all new labs and imaging results over the last 24 hours. I personally reviewed no images or EKG's today.    Physical Exam   Temp: 97.8  F (36.6  C) Temp src: Oral BP: 124/59   Heart Rate: 73 Resp: 16 SpO2: 95 % O2 Device: None (Room air)    Vitals:    05/29/19 0704 05/30/19 0421 05/31/19 0500   Weight: 75.3 kg (166 lb 0.1 oz) 75.8 kg (167 lb) 76.7 kg (169 lb)     Vital Signs with Ranges  Temp:  [97.4  F (36.3  C)-99.2  F (37.3  C)] 97.8  F (36.6  C)  Heart Rate:  [69-73] 73  Resp:  [16-18] 16  BP: (116-157)/(54-70) 124/59  SpO2:  [94 %-99 %] 95 %  I/O last 3 completed shifts:  In: 840 [NG/GT:840]  Out: 400 [Urine:400]    Gen: Well nourished, well developed, alert and oriented x 3, NAD  HEENT: Atraumatic, normocephalic  Lungs: Clear to ausculation without wheezes, rhonchi, or rales  Heart: Regular rate and rhythm, no gallops or rubs, no murmurs  GI: Bowel sound normal, no hepatosplenomegaly or masses  Lymph: No lymphadenopathy or edema  Skin: No rashes . Right knee is swollen and warm with a scabbed area on it with effusion present     Medications     IV fluid REPLACEMENT ONLY 60 mL/hr at 05/29/19 1455     - MEDICATION INSTRUCTIONS -         amLODIPine  5 mg Oral or Feeding Tube Daily     insulin aspart  1-12 Units Subcutaneous Q4H     insulin glargine  30 Units Subcutaneous Daily     levothyroxine  100 mcg Oral or Feeding Tube Daily     metoprolol  12.5 mg Per Feeding Tube BID     sodium chloride (PF)  3 mL Intracatheter Q8H       Data   Recent Labs   Lab 05/29/19  0600 05/28/19  0714 05/27/19  0737 05/26/19  0830   WBC 10.7  --   --  9.6   HGB 10.3*  --   --  10.6*   MCV 90  --   --  91     --   --  156   INR 1.08  --   --   --     144 147* 148*   POTASSIUM 3.9 3.8 4.1 4.0   CHLORIDE 108 111* 115* 117*   CO2 27 28 27 25   BUN 31* 36* 41* 42*   CR 1.74* 1.71* 2.06* 1.96*   ANIONGAP 5 5 5 6   [place] 8.1* 8.7 8.4* 8.4*   * 203* 166* 133*   ALBUMIN  --   --   --  2.0*       No results found  for this or any previous visit (from the past 24 hour(s)).

## 2019-05-31 NOTE — PLAN OF CARE
Discharge Planner OT   Patient plan for discharge: Rehab  Current status: Sup<>sit mod Ax2 to bring legs to EOB and bring body upright, CGA/min A to maintain dynamic sitting balance, CGA/tactile cues to maintain static sitting balance. Pt Completed g/h tasks at EOB with Mod A to wash right side of body. CGA and VC's  to wash L side w/noted L neglect. CGA for oral cares. Provided a rolled towel to improve positioning to decrease flexed tone.  Barriers to return to prior living situation: Fall risk with history; Lives alone; Stairs to enter and within home     Recommendations for discharge: TCU    Rationale for recommendations: Pt continues to be limited by L UE weakness and incoordination, L side neglect, impaired cognition and safety, pain, and decreased balance; however, making gains with OT intervention. Pt may be able to tolerate 3 hours of therapy daily. Pt's family very supportive. Good motivation and improvements noted from previous sessions.          Entered by: Lakesha Vásquez 05/31/2019 4:11 PM

## 2019-06-01 NOTE — CONSULTS
[facility] Orthopedic Consultation    Jayson Leija MRN# [id number]   Age: 89 year old YOB: 1929     Date of Admission:  5/17/2019    Reason for consult: Right knee pain        Requesting physician: Dr. Sun       Level of consult: One-time consult to assist in determining a diagnosis, recommend an appropriate treatment plan and place orders           Assessment and Plan:   Assessment:   Right knee effusion       Plan:   Ordering dedicated films of right knee to r/o fracture; low suspicion of this.   Non operative management   Activity as tolerated to bilateral lower extremities   Ice  ACE wrap   Pain control           Chief Complaint:   Bilateral knee pain          History of Present Illness:   This patient is a 89 year old male who presents with the following condition requiring a hospital admission:    Jayson Leija is a 89 year old male with PMH significant for chronic a-fib/complete heart block s/p PPM on chronic anticoagulation with warfarin who initially presented to [facility] on 5/17/2019 after being found down at home. He was found to have a large right-sided subdural hematoma, and was transferred to [facility] for further evaluation and management. During his hospital course here the patient complained of right knee pain.     Patient states he has bilateral knee pain that has been ongoing for 6-8 months. He states the right knee feels slightly worse than the left. Patient is unable to provide a reliable history of fall or mechanism of injury. He denies pain elsewhere. The patient is ambulatory at baseline without the use of a walker or cane.             Past Medical History:     Past Medical History:   Diagnosis Date     Atrial fibrillation (H)      Chronic kidney disease      Diabetes (H)      Hypertension      Thyroid disease              Past Surgical History:     Past Surgical History:   Procedure Laterality Date     IR GASTROSTOMY TUBE PERCUTANEOUS PLCMNT  5/29/2019       "       Social History:     Social History     Tobacco Use     Smoking status: Never Smoker     Smokeless tobacco: Never Used   Substance Use Topics     Alcohol use: Yes     Comment: \"very, very rarely\"             Family History:   No family history on file.          Immunizations:     VACCINE/DOSE   Diptheria   DPT   DTAP   HBIG   Hepatitis A   Hepatitis B   HIB   Influenza   Measles   Meningococcal   MMR   Mumps   Pneumococcal   Polio   Rubella   Small Pox   TDAP   Varicella   Zoster             Allergies:   No Known Allergies          Medications:     Current Facility-Administered Medications   Medication     acetaminophen (TYLENOL) tablet 650 mg    Or     acetaminophen (TYLENOL) solution 650 mg     acetaminophen (TYLENOL) Suppository 650 mg     amLODIPine (NORVASC) tablet 5 mg     benztropine (COGENTIN) tablet 1-2 mg     dextrose 10 % 1,000 mL infusion     glucose gel 15-30 g    Or     dextrose 50 % injection 25-50 mL    Or     glucagon injection 1 mg     hydrALAZINE (APRESOLINE) injection 10-20 mg     HYDROmorphone (PF) (DILAUDID) injection 0.3-0.5 mg     insulin aspart (NovoLOG) inj (RAPID ACTING)     insulin glargine (LANTUS PEN) injection 40 Units     labetalol (NORMODYNE/TRANDATE) syringe 10-20 mg     levothyroxine (SYNTHROID/LEVOTHROID) tablet 100 mcg     lidocaine (LMX4) cream     lidocaine 1 % 1 mL     May take regular AM medications except those listed below     metoprolol (FIRST-METOPROLOL) solution 12.5 mg     naloxone (NARCAN) injection 0.1-0.4 mg     ondansetron (ZOFRAN-ODT) ODT tab 4 mg    Or     ondansetron (ZOFRAN) injection 4 mg     oxyCODONE (ROXICODONE) solution 5-10 mg     polyethylene glycol (MIRALAX/GLYCOLAX) Packet 17 g     prochlorperazine (COMPAZINE) injection 5 mg    Or     prochlorperazine (COMPAZINE) tablet 5 mg    Or     prochlorperazine (COMPAZINE) Suppository 12.5 mg     senna-docusate (SENOKOT-S/PERICOLACE) 8.6-50 MG per tablet 1 tablet    Or     senna-docusate " (SENOKOT-S/PERICOLACE) 8.6-50 MG per tablet 2 tablet     sodium chloride (PF) 0.9% PF flush 3 mL     sodium chloride (PF) 0.9% PF flush 3 mL             Review of Systems:   CV: NEGATIVE for chest pain, palpitations or peripheral edema  C: NEGATIVE for fever, chills, change in weight  E/M: NEGATIVE for ear, mouth and throat problems  R: NEGATIVE for significant cough or SOB    10 point review of systems negative aside from HPI and physical exam.            Physical Exam:   All vitals have been reviewed  Patient Vitals for the past 24 hrs:   BP Temp Temp src Pulse Heart Rate Resp SpO2 Weight   06/01/19 0812 135/71 98.7  F (37.1  C) Tympanic -- 71 16 99 % --   06/01/19 0500 -- -- -- -- -- -- -- 73 kg (160 lb 15 oz)   06/01/19 0436 120/60 97.8  F (36.6  C) Oral -- 68 16 97 % --   06/01/19 0019 113/58 97.8  F (36.6  C) Oral -- 70 16 97 % --   05/31/19 2208 124/57 -- -- -- -- -- -- --   05/31/19 1958 146/56 97.3  F (36.3  C) Oral 70 70 16 98 % --   05/31/19 1643 146/65 98  F (36.7  C) Oral -- 69 16 95 % --   05/31/19 1203 124/59 97.8  F (36.6  C) Oral -- 73 16 95 % --       Intake/Output Summary (Last 24 hours) at 6/1/2019 1008  Last data filed at 6/1/2019 0932  Gross per 24 hour   Intake 1075 ml   Output 225 ml   Net 850 ml       Constitutional: Pleasant, alert, appropriate, following commands.  HEENT: Head atraumatic normocephalic. PERRLA.  Respiratory: Unlabored breathing no audible wheeze  Cardiovascular: Regular rate and rhythm  GI: Abdomen soft, non tender, and non distended.  Lymph/Hematologic: No edema or palor  Skin: No rashes, no cyanosis, no edema.  Neuro: Sensation intact to light touch in bilateral lower extremities.  Musculoskeletal: Patient laying comfortably in bed   Scabbing to inferior pole of patella   Mild effusion   No TTP about the knee  Active ROM from 20-60 degrees   No pain with active or passive ROM of bilateral knees  Right knee non tender to palpation   Hip flexes to 100 degrees   No pain with  internal/external rotation while in flexion  Bilateral calves are soft, non-tender.  Bilateral lower extremity is NVI.  Sensation intact bilateral lower extremities  5/5 motor with resisted dorsi and plantar flexion bilaterally  SLR intact bilaterally   +Dp pulse            Data:   All laboratory data reviewed  Results for orders placed or performed during the hospital encounter of 05/17/19   CT Head w/o Contrast    Narrative    CT SCAN OF THE HEAD WITHOUT CONTRAST   5/17/2019 10:26 PM     HISTORY: Reevaluate subdural.    TECHNIQUE:  Axial images of the head and coronal reformations without  IV contrast material. Radiation dose for this scan was reduced using  automated exposure control, adjustment of the mA and/or kV according  to patient size, or iterative reconstruction technique.    COMPARISON: Head CT 5/17/2019.    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 The degree of right to left midline shift (7 mm)  and subfalcine herniation are unchanged. No evidence of uncal  herniation. Basilar cisterns remain patent. No new sites of hemorrhage  are identified. Volume loss and patchy white matter hypoattenuation  likely represent chronic small vessel ischemic change, unchanged.  Subcentimeter area of hyperattenuation within the right insula is  unchanged, probably old lacunar infarct. The calvarium, skull base,  sinuses, and extra cranial soft tissues are unremarkable.      Impression    IMPRESSION: No change of right convexity acute on chronic subdural  hematoma with mild mass effect and midline shift.    AL LAGOS MD   CT Head w/o Contrast    Narrative    CT SCAN OF THE HEAD WITHOUT CONTRAST   5/19/2019 5:21 AM     HISTORY: Intracranial hemorrhage, known, follow up.    TECHNIQUE:  Axial images of the head and coronal reformations without  IV contrast material. Radiation dose for this scan was reduced  using  automated exposure control, adjustment of the mA and/or kV according  to patient size, or iterative reconstruction technique.    COMPARISON: Multiple head CTs 5/17/2019.    FINDINGS:  Right convexity subdural hematoma is unchanged in size  compared to the prior exam again measuring approximately 2.7 cm in  maximum thickness. The degree of mass effect on the right cerebral  hemisphere, slight sub-pulsing, and slight (7 mm) of right-to-left  midline shift is unchanged. No new sites of hemorrhage. Background of  volume loss is present with patchy white matter hypoattenuation likely  representing chronic small vessel ischemic change. No evidence of  acute ischemia. The calvarium, skull base, paranasal sinuses, and  extracranial soft tissues are unremarkable.      Impression    IMPRESSION:   No change of right convexity subacute on chronic  subdural hematoma with mild mass effect and midline shift.    AL LAGOS MD   XR Video Swallow w/o Esophagram    Narrative    VIDEO SWALLOWING EVALUATION   5/19/2019 10:16 AM     HISTORY: Dysphagia, aspiration risk.    COMPARISON: None.    FLUOROSCOPY TIME: 2.4 minutes.  SPOT IMAGES OR CINE RUNS: 6.    FINDINGS:    Thin: Aspiration (silent).    Nectar: Aspiration (silent).    Honey: Penetration and eventual aspiration of probable residue.    Pudding: Penetration. No aspiration.    Semisolid: Not administered.    Solid: Not administered.    This study only includes the cervical esophagus.    AL LAGOS MD   XR Feeding Tube Placement    Narrative    FEEDING TUBE PLACEMENT   5/20/2019 2:24 PM    HISTORY: Failed video swallow.    COMPARISON: None    FINDINGS: 2.8 minutes of fluoroscopy were utilized for placement of a  feeding tube.  At the final position, the feeding tube tip was distal  duodenum.  15 mL of contrast was injected to confirm placement.  The  tube was marked at the level of the nose at the 88 cm length.   Estimated blood loss during the procedure was less  than 5 mL. No  specimens collected. There were no complications of the procedure.    Medications used: 5 mL Lidocaine jelly, 15 mL Omnipaque 240    Images Obtained: 1      Impression    IMPRESSION: Successful feeding tube placement.    CLAYTON HUMPHREY PA-C   XR Abdomen 1 View    Narrative    XR ABDOMEN ONE VIEW  5/26/2019 9:22 AM     COMPARISON: None.    HISTORY: Tube feeding placement.    FINDINGS: Tip of the feeding tube is in the mid third portion of the  duodenum. Abdominal bowel gas pattern is nonspecific. There is no  evidence for free intraperitoneal air.      Impression    IMPRESSION: No evidence for bowel obstruction or free air.    RK CHILDRESS MD   XR Abdomen 1 View    Narrative    XR ABDOMEN ONE VIEW 5/26/2019 10:08 AM     COMPARISON: Frontal view of the abdomen same day.    HISTORY: Tube feeding placement.      FINDINGS: Tip of the feeding tube remains in the mid third portion of  the duodenum. Abdominal bowel gas pattern is nonspecific. There is no  evidence for free intraperitoneal air.      Impression    IMPRESSION: No evidence for bowel obstruction or free air.    RK CHILDRESS MD   XR Feeding Tube Placement    Narrative    FEEDING TUBE PLACEMENT 5/27/2019 10:40 AM    COMPARISON: None.    HISTORY: Feeding tube replacement, current NG occluded.    CONTRAST: 10 mL Isovue 240.    MEDICATIONS: 4 mL viscous lidocaine.    FLUOROSCOPY TIME: 3.0 minutes.    Feeding tube length to tip of the nose: 110 cm.    PROCEDURE: The patient was placed in the supine position on the  fluoroscopy table. The right nostril was anesthetized with viscous  topical lidocaine. The feeding tube was also lubricated with viscous  lidocaine. The feeding tube was passed through the right nostril but  could not be passed into the nasopharynx. Therefore, the left nostril  was anesthetized with viscous topical lidocaine, the feeding tube was  then passed through the left nostril, through the esophagus into the  stomach. Using  fluoroscopic guidance, the feeding tube was advanced  into the proximal jejunum.    There are were no immediate complications. The patient tolerated the  procedure extremely well.      Impression    IMPRESSION: Successful, uncomplicated, fluoroscopically guided feeding  tube placement.    RK CHILDRESS MD   XR Video Swallow w/o Esophagram    Narrative    VIDEO SWALLOWING EVALUATION May 28, 2019 9:21 AM     HISTORY: Dysphagia.    COMPARISON: None.    FLUOROSCOPY TIME: 0.8 minutes.     Number of cine runs: Five.    FINDINGS:    Thin: Not administered.    Nectar: Moderate penetration. Mild hypopharyngeal residue.    Honey: Moderate hypopharyngeal residue. Moderate penetration during  the swallow. Aspiration of residue without spontaneous cough.    Pudding: Azam aspiration without spontaneous cough. Moderate  hypopharyngeal residue.    Semisolid: Not administered.    Solid: Not administered.    This study only includes the cervical esophagus.    RK CHILDRESS MD   IR Gastrostomy Tube Percutaneous Plcmnt    Narrative    PROCEDURE(S):  1. Nasogastric tube placement.  2. Percutaneous gastrostomy tube placement.      DATE OF PROCEDURE: 5/29/2019 9:05 AM.    MEDICATIONS: 10 mL 1% Lidocaine SQ, Versed 1 mg IV, Fentanyl 50 mcg  IV.    CONTRAST: 8 mL Omnipaque 240 into the gastric lumen.    REFERENCED AIR KERMA: 9.85 mGy.  FLUOROSCOPY TIME: 2.1 minutes.     ESTIMATED BLOOD LOSS: Minimal.    COMPLICATIONS: None.    CLINICAL HISTORY/INDICATION: Dysphagia. Needs feeding tube for  long-term nutrition    PROCEDURES AND FINDINGS: Following a discussion of the risks,  benefits, indications, and alternatives to treatment, informed consent  was obtained. The patient was brought to the interventional radiology  suite and placed supine on the table. A limited ultrasound of the  abdomen was performed to localize the liver, which was marked on the  skin. A nasogastric feeding tube was inserted with the tip in the  stomach. The abdomen  "was then prepped and draped in a routine sterile  fashion. A timeout was performed per hospital universal protocol  policy to ensure correct patient, site, and procedure to be performed.    The stomach was then insufflated with air via the indwelling  nasogastric tube. A suitable site for percutaneous access into the  stomach was marked, and local anesthesia was obtained with 1%  Lidocaine. Needle access into the stomach was obtained. Position was  confirmed by aspiration of gas and dripping of contrast into the  gastric lumen. A T-fastener was deployed through the needle, and the  anterior stomach wall was tacked to the anterior abdominal wall. This  procedure was repeated with a second T-fastener. A 1 cm incision was  made between the T-fasteners, and access into the stomach was obtained  using an 18 G needle. Position was confirmed by aspirating gas and  dripping contrast into the gastric lumen.     A 0.035\" Amplatz wire was then advanced across the needle into the  gastric lumen. The needle was removed and the tract was serially  dilated. Finally, a peel-away sheath was placed and through the sheath  and over the wire, an 14 Greek gastrostomy tube was placed into the  stomach. Position was confirmed by dripping contrast through the tube.  The retention balloon was insufflated with 5 mL of dilute contrast.  The retention disk was cinched to the skin and the T-fasteners were  secured to the skin. A sterile dressing was applied, and the tube was  capped. The nasogastric tube was removed.     Throughout the procedure, the patient was monitored by a radiology  nurse for cardiac rhythm and oxygen saturation which remained stable.  Total moderate sedation time was 20 minutes. The patient tolerated the  procedure well and left interventional radiology in stable condition.      Impression    IMPRESSION: Placement of a 14 Greek gastrostomy tube, as detailed  above.    ANASTASIYA POLANCO, DO   CT Head w/o Contrast    " Narrative    CT OF THE HEAD WITHOUT CONTRAST 5/31/2019 1:42 PM     COMPARISON: Head CT 5/19/2019    HISTORY: Subdural hematoma follow-up.    TECHNIQUE: 5 mm thick axial CT images of the head were acquired  without IV contrast material.    FINDINGS: Mixed subdural hemorrhage along the right cerebral convexity  measuring up to 2.6 cm in thickness is again noted. Mass effect due to  the subdural hematoma results in 0.7 cm leftward midline shift of the  septum pellucidum which is stable from the comparison study. There is  no definite evidence for new or increasing hemorrhage. There is no  hydrocephalus. There is no evidence for ischemic infarct.    There is moderate diffuse cerebral volume loss. There are subtle  patchy areas of decreased density in the cerebral white matter  bilaterally that are consistent with sequela of chronic small vessel  ischemic disease.    The visualized paranasal sinuses are well aerated. There is no  mastoiditis. There are no fractures of the visualized bones.       Impression    IMPRESSION:   1. Stable subdural hemorrhage along the right cerebral convexity  without evidence for new or increasing intracranial hemorrhage.  2. Stable leftward midline shift of the septum pellucidum measuring  0.7 cm.  3. Diffuse cerebral volume loss and cerebral white matter changes  consistent with chronic small vessel ischemic disease.     Radiation dose for this scan was reduced using automated exposure  control, adjustment of the mA and/or kV according to patient size, or  iterative reconstruction technique.    RK CHILDRESS MD   Hemoglobin A1c   Result Value Ref Range    Hemoglobin A1C 7.9 (H) 0 - 5.6 %   Glucose by meter   Result Value Ref Range    Glucose 314 (H) 70 - 99 mg/dL   INR   Result Value Ref Range    INR 1.21 (H) 0.86 - 1.14   Glucose by meter   Result Value Ref Range    Glucose 304 (H) 70 - 99 mg/dL   CK total   Result Value Ref Range    CK Total 824 (H) 30 - 300 U/L   Lactic acid whole blood    Result Value Ref Range    Lactic Acid 1.0 0.7 - 2.0 mmol/L   CBC with platelets differential   Result Value Ref Range    WBC 14.5 (H) 4.0 - 11.0 10e9/L    RBC Count 3.92 (L) 4.4 - 5.9 10e12/L    Hemoglobin 12.0 (L) 13.3 - 17.7 g/dL    Hematocrit 34.6 (L) 40.0 - 53.0 %    MCV 88 78 - 100 fl    MCH 30.6 26.5 - 33.0 pg    MCHC 34.7 31.5 - 36.5 g/dL    RDW 13.8 10.0 - 15.0 %    Platelet Count 133 (L) 150 - 450 10e9/L    Diff Method Automated Method     % Neutrophils 86.3 %    % Lymphocytes 4.5 %    % Monocytes 8.8 %    % Eosinophils 0.0 %    % Basophils 0.1 %    % Immature Granulocytes 0.3 %    Nucleated RBCs 0 0 /100    Absolute Neutrophil 12.5 (H) 1.6 - 8.3 10e9/L    Absolute Lymphocytes 0.7 (L) 0.8 - 5.3 10e9/L    Absolute Monocytes 1.3 0.0 - 1.3 10e9/L    Absolute Eosinophils 0.0 0.0 - 0.7 10e9/L    Absolute Basophils 0.0 0.0 - 0.2 10e9/L    Abs Immature Granulocytes 0.1 0 - 0.4 10e9/L    Absolute Nucleated RBC 0.0    Comprehensive metabolic panel   Result Value Ref Range    Sodium 141 133 - 144 mmol/L    Potassium 4.1 3.4 - 5.3 mmol/L    Chloride 112 (H) 94 - 109 mmol/L    Carbon Dioxide 19 (L) 20 - 32 mmol/L    Anion Gap 10 3 - 14 mmol/L    Glucose 303 (H) 70 - 99 mg/dL    Urea Nitrogen 81 (H) 7 - 30 mg/dL    Creatinine 2.79 (H) 0.66 - 1.25 mg/dL    GFR Estimate 19 (L) >60 mL/min/[1.73_m2]    GFR Estimate If Black 22 (L) >60 mL/min/[1.73_m2]    Calcium 8.0 (L) 8.5 - 10.1 mg/dL    Bilirubin Total 2.9 (H) 0.2 - 1.3 mg/dL    Albumin 2.7 (L) 3.4 - 5.0 g/dL    Protein Total 6.0 (L) 6.8 - 8.8 g/dL    Alkaline Phosphatase 64 40 - 150 U/L    ALT 31 0 - 70 U/L    AST 38 0 - 45 U/L   INR   Result Value Ref Range    INR 1.21 (H) 0.86 - 1.14   Glucose by meter   Result Value Ref Range    Glucose 275 (H) 70 - 99 mg/dL   Glucose by meter   Result Value Ref Range    Glucose 262 (H) 70 - 99 mg/dL   Glucose by meter   Result Value Ref Range    Glucose 239 (H) 70 - 99 mg/dL   Glucose by meter   Result Value Ref Range    Glucose  273 (H) 70 - 99 mg/dL   Hemoglobin A1c   Result Value Ref Range    Hemoglobin A1C 8.5 (H) 0 - 5.6 %   Glucose by meter   Result Value Ref Range    Glucose 195 (H) 70 - 99 mg/dL   Glucose by meter   Result Value Ref Range    Glucose 151 (H) 70 - 99 mg/dL   Basic metabolic panel   Result Value Ref Range    Sodium 151 (H) 133 - 144 mmol/L    Potassium 3.8 3.4 - 5.3 mmol/L    Chloride 121 (H) 94 - 109 mmol/L    Carbon Dioxide 18 (L) 20 - 32 mmol/L    Anion Gap 12 3 - 14 mmol/L    Glucose 135 (H) 70 - 99 mg/dL    Urea Nitrogen 91 (H) 7 - 30 mg/dL    Creatinine 3.13 (H) 0.66 - 1.25 mg/dL    GFR Estimate 17 (L) >60 mL/min/[1.73_m2]    GFR Estimate If Black 19 (L) >60 mL/min/[1.73_m2]    Calcium 8.4 (L) 8.5 - 10.1 mg/dL   CBC with platelets   Result Value Ref Range    WBC 9.8 4.0 - 11.0 10e9/L    RBC Count 3.79 (L) 4.4 - 5.9 10e12/L    Hemoglobin 11.7 (L) 13.3 - 17.7 g/dL    Hematocrit 33.8 (L) 40.0 - 53.0 %    MCV 89 78 - 100 fl    MCH 30.9 26.5 - 33.0 pg    MCHC 34.6 31.5 - 36.5 g/dL    RDW 13.9 10.0 - 15.0 %    Platelet Count 129 (L) 150 - 450 10e9/L   Glucose by meter   Result Value Ref Range    Glucose 173 (H) 70 - 99 mg/dL   Glucose by meter   Result Value Ref Range    Glucose 104 (H) 70 - 99 mg/dL   Glucose by meter   Result Value Ref Range    Glucose 92 70 - 99 mg/dL   Glucose by meter   Result Value Ref Range    Glucose 88 70 - 99 mg/dL   Glucose by meter   Result Value Ref Range    Glucose 66 (L) 70 - 99 mg/dL   Glucose by meter   Result Value Ref Range    Glucose 130 (H) 70 - 99 mg/dL   CBC with platelets   Result Value Ref Range    WBC 9.1 4.0 - 11.0 10e9/L    RBC Count 4.19 (L) 4.4 - 5.9 10e12/L    Hemoglobin 12.8 (L) 13.3 - 17.7 g/dL    Hematocrit 38.1 (L) 40.0 - 53.0 %    MCV 91 78 - 100 fl    MCH 30.5 26.5 - 33.0 pg    MCHC 33.6 31.5 - 36.5 g/dL    RDW 14.3 10.0 - 15.0 %    Platelet Count 142 (L) 150 - 450 10e9/L   Basic metabolic panel   Result Value Ref Range    Sodium 156 (H) 133 - 144 mmol/L    Potassium  3.6 3.4 - 5.3 mmol/L    Chloride 126 (H) 94 - 109 mmol/L    Carbon Dioxide 23 20 - 32 mmol/L    Anion Gap 7 3 - 14 mmol/L    Glucose 121 (H) 70 - 99 mg/dL    Urea Nitrogen 64 (H) 7 - 30 mg/dL    Creatinine 2.26 (H) 0.66 - 1.25 mg/dL    GFR Estimate 25 (L) >60 mL/min/[1.73_m2]    GFR Estimate If Black 29 (L) >60 mL/min/[1.73_m2]    Calcium 9.1 8.5 - 10.1 mg/dL   Glucose by meter   Result Value Ref Range    Glucose 97 70 - 99 mg/dL   Glucose by meter   Result Value Ref Range    Glucose 71 70 - 99 mg/dL   Glucose by meter   Result Value Ref Range    Glucose 80 70 - 99 mg/dL   Glucose by meter   Result Value Ref Range    Glucose 113 (H) 70 - 99 mg/dL   Glucose by meter   Result Value Ref Range    Glucose 124 (H) 70 - 99 mg/dL   Glucose by meter   Result Value Ref Range    Glucose 173 (H) 70 - 99 mg/dL   CBC with platelets   Result Value Ref Range    WBC 8.5 4.0 - 11.0 10e9/L    RBC Count 4.24 (L) 4.4 - 5.9 10e12/L    Hemoglobin 12.8 (L) 13.3 - 17.7 g/dL    Hematocrit 38.9 (L) 40.0 - 53.0 %    MCV 92 78 - 100 fl    MCH 30.2 26.5 - 33.0 pg    MCHC 32.9 31.5 - 36.5 g/dL    RDW 14.4 10.0 - 15.0 %    Platelet Count 141 (L) 150 - 450 10e9/L   Basic metabolic panel   Result Value Ref Range    Sodium 154 (H) 133 - 144 mmol/L    Potassium 3.6 3.4 - 5.3 mmol/L    Chloride 124 (H) 94 - 109 mmol/L    Carbon Dioxide 22 20 - 32 mmol/L    Anion Gap 8 3 - 14 mmol/L    Glucose 264 (H) 70 - 99 mg/dL    Urea Nitrogen 62 (H) 7 - 30 mg/dL    Creatinine 2.15 (H) 0.66 - 1.25 mg/dL    GFR Estimate 26 (L) >60 mL/min/[1.73_m2]    GFR Estimate If Black 30 (L) >60 mL/min/[1.73_m2]    Calcium 8.9 8.5 - 10.1 mg/dL   Glucose by meter   Result Value Ref Range    Glucose 185 (H) 70 - 99 mg/dL   Glucose by meter   Result Value Ref Range    Glucose 202 (H) 70 - 99 mg/dL   Glucose by meter   Result Value Ref Range    Glucose 227 (H) 70 - 99 mg/dL   Glucose by meter   Result Value Ref Range    Glucose 211 (H) 70 - 99 mg/dL   Sodium   Result Value Ref Range     Sodium 155 (H) 133 - 144 mmol/L   Glucose by meter   Result Value Ref Range    Glucose 184 (H) 70 - 99 mg/dL   Glucose by meter   Result Value Ref Range    Glucose 263 (H) 70 - 99 mg/dL   Basic metabolic panel   Result Value Ref Range    Sodium 152 (H) 133 - 144 mmol/L    Potassium 3.6 3.4 - 5.3 mmol/L    Chloride 122 (H) 94 - 109 mmol/L    Carbon Dioxide 23 20 - 32 mmol/L    Anion Gap 7 3 - 14 mmol/L    Glucose 327 (H) 70 - 99 mg/dL    Urea Nitrogen 50 (H) 7 - 30 mg/dL    Creatinine 2.11 (H) 0.66 - 1.25 mg/dL    GFR Estimate 27 (L) >60 mL/min/[1.73_m2]    GFR Estimate If Black 31 (L) >60 mL/min/[1.73_m2]    Calcium 8.5 8.5 - 10.1 mg/dL   Glucose by meter   Result Value Ref Range    Glucose 221 (H) 70 - 99 mg/dL   Glucose by meter   Result Value Ref Range    Glucose 257 (H) 70 - 99 mg/dL   Magnesium   Result Value Ref Range    Magnesium 2.1 1.6 - 2.3 mg/dL   Glucose by meter   Result Value Ref Range    Glucose 338 (H) 70 - 99 mg/dL   Sodium   Result Value Ref Range    Sodium 150 (H) 133 - 144 mmol/L   Glucose by meter   Result Value Ref Range    Glucose 317 (H) 70 - 99 mg/dL   Glucose by meter   Result Value Ref Range    Glucose 269 (H) 70 - 99 mg/dL   Glucose by meter   Result Value Ref Range    Glucose 254 (H) 70 - 99 mg/dL   Basic metabolic panel   Result Value Ref Range    Sodium 144 133 - 144 mmol/L    Potassium 3.9 3.4 - 5.3 mmol/L    Chloride 115 (H) 94 - 109 mmol/L    Carbon Dioxide 23 20 - 32 mmol/L    Anion Gap 6 3 - 14 mmol/L    Glucose 292 (H) 70 - 99 mg/dL    Urea Nitrogen 43 (H) 7 - 30 mg/dL    Creatinine 1.77 (H) 0.66 - 1.25 mg/dL    GFR Estimate 33 (L) >60 mL/min/[1.73_m2]    GFR Estimate If Black 38 (L) >60 mL/min/[1.73_m2]    Calcium 8.2 (L) 8.5 - 10.1 mg/dL     *Note: Due to a large number of results and/or encounters for the requested time period, some results have not been displayed. A complete set of results can be found in Results Review.          Attestation:  I have reviewed today's vital  signs, notes, medications, labs and imaging with Dr. Quintero.  Amount of time performed on this consult: 25 minutes.    Nithya Cespedes PA-C

## 2019-06-01 NOTE — PLAN OF CARE
Pt s/p fall and traumatic SDH. Alert and oriented x1-2. Forgetful. VSS. Tele 100% V paced. Neuros stable with improving L sided hemiparesis and L sided neglect. R knee +2 edema, CMS otherwise intact. Lung sounds clear. +BS and had small BM. Incontinent of bowel and bladder. Wounds on coccyx and mid spine with scant drainage, cleaned and dressings changed per plan of care. Denies pain at rest. Tolerating tube feed at goal rate. Repositioned q 2 hrs. Pt discharged to TCU - discharge instructions reviewed with daughter Tami over the phone.

## 2019-06-01 NOTE — PLAN OF CARE
Discharge Planner OT   Patient plan for discharge: Rehab before home     Current status: pt was still on EEG, pt participated with BUE HEP to progress strength for transfers and daily task, pt required assist to complete BUE to end range, pt fatigues with activity.   Barriers to return to prior living situation: Fall risk with history; Lives alone; Stairs to enter and within home     Recommendations for discharge: TCU per plan established by the Occupational Therapist     Rationale for recommendations: Pt continues to be limited by L UE weakness and incoordination, L side neglect, impaired cognition and safety, pain, and decreased balance; however, making gains with OT intervention.  Pt's family very supportive. Good motivation and improvements noted from previous sessions.          Entered by: Maritza Barahona 06/01/2019 12:16 PM      Pt to discharge to TCU today, GOALS NOT MET, see discharge summary

## 2019-06-01 NOTE — PROGRESS NOTES
Preliminary Video EEG impression on May 31 - June 1, 2019  Until 7am    Full report to follow. Please look in inpatient chart, under procedure section.     This EEG is abnormal due to the presences of continuous theta slowing with maximum slowing noted in the right hemisphere. The left hemisphere does have a 8 to 9 Hz parieto-occipital rhythm There is sharply contoured theta activity with maximum negativity at T4 rarely noted early on in the record, clear epileptiform discharges were not noted. No seizure were seen.     These findings are consistent with a mild encephalopathy with cortical lesion in the right hemisphere, this is consistent with patient's history of subdural hematoma on the right.  No electrographic seizures or epileptiform discharges are seen in the record. Clinical correlation is advised.     Sharon Orourke MD  Staff Epilepsy Neurologist   [phone]

## 2019-06-01 NOTE — PROVIDER NOTIFICATION
"Call placed to hospitalist service: \"patient is ok to D/C from neurologic perspective. Ride has been set up for 1600, please see for orders. Thanks so much!\"  "

## 2019-06-01 NOTE — PLAN OF CARE
"Discharge Planner PT   Patient plan for discharge: TCU today (ride set up for 1600)  Current status: Pt just returned from right knee x-ray (revealed large right knee effusion). Per orthotics note: \"activity as tolerated to bilateral LEs\". Pt agreeable to PT session. [place]. Supine to sit with mod A x 2 with cues throughout for technique, Max A with seated scooting at EOB with cues for positioning. Sit to stand with right UE on railing and left hand over hand placement on railing  4 reps with max A x 2, required therapist blocking his left knee. Pt with difficulty maintaining upright standing posture, unable to resume full erect standing despite tactile and verbal cues. Max A x 2 with sit to supine as pt requesting to rest. Utilized ceiling lift in bed for boosting, max A with rolling right for repo. Instructed pt with supine LE ex bilaterally. Generalized pain complaints in bilateral knees during session. Increased right knee flexion only with AAROM right knee flexion >90deg.   Barriers to return to prior living situation: A x 2, lives alone.   Recommendations for discharge: TCU  Rationale for recommendations: Pt will benefit from continued skilled PT at TCU to improve strength, balance, gait, endurance to improve functional mobility prior to return home. TCU more appropriate as pt lives alone and will not have 24/7 support       Physical Therapy Discharge Summary    Reason for therapy discharge:    Discharged to transitional care facility. Planned discharge to TCU at 1600 today.     Progress towards therapy goal(s). See goals on Care Plan in [facility] electronic health record for goal details.  Goals not met.  Barriers to achieving goals:   discharge from facility.    Therapy recommendation(s):    Continued therapy is recommended.  Rationale/Recommendations:  see above for rational for recommenations.           Entered by: Melissa Nunes 06/01/2019 12:47 PM       "

## 2019-06-01 NOTE — PROGRESS NOTES
Call placed to neurology to see if EEG can be discontinued.    Spoke with Mckenzie Pro MD. OK to discontinue EEG, patient may be discharged from neurological standpoint.

## 2019-06-01 NOTE — DISCHARGE INSTRUCTIONS
Remove suture Gastric tube site on [address] 10    You are discharging to:  [address]  [address] [phone]

## 2019-06-01 NOTE — PROGRESS NOTES
FLIP    I: SW received update patient's anticipated discharge to TCU is today. [facility] admissions is in agreement and aware patient is to be seen be orthopedics today prior to discharge, thus accepts a later admit time. JUAN MIGUEL set up [facility] stretcher ride for: 1600. PCS form completed, faxed to [facility] and placed in front of patient's chart. SW updated staff and facility and daughter, Tami. Per [facility], SW is directed to fax orders to: [phone] as their admissions is closing at 1pm. Charge RN will be available after that time at: [phone]. Awaiting orders.     P: Continue to assist as needed.    FINA Oliver    UPDATE@3448: Orders and PAS faxed to facility. No further SW interventions anticipated.    PAS-RR    D: Per DHS regulation, SW completed and submitted PAS-RR to [place] [employer] Direct Connect via the Senior LinkAge Line.  PAS-RR confirmation # is : [id number]    I: SW spoke with patient and they are aware a PAS-RR has been submitted.  SW reviewed with patient that they may be contacted for a follow up appointment within 10 days of hospital discharge if their SNF stay is < 30 days.  Contact information for Senior LinkAge Line was also provided.    A: patient verbalized understanding.    P: Further questions may be directed to [facility] LinkAge Line at #[phone], option #4 for PAS-RR staff.

## 2019-06-01 NOTE — PLAN OF CARE
"Discharge Planner SLP   Patient plan for discharge: Going to TCU @ 1600  Current status: Patient seen for oral-pharyngeal strengthening exercises. Pt reported that he is tired but will do a couple. He was able to verbalize 1/2 strategies. PT completed effortful swallow x7 and chin-tuck against resistance x10. Increased fatigue noted during exercises today. Written instructions on white board to target effortful swallow \"swallow a golf ball\" x10 and chin tuck against resistance (rolled up wash cloth under chin and cue pt to push chin into wash cloth as hard as possible, hold for 5 seconds and complete x20 if pt able to tolerate). Continue NPO with TF; use ice water to complete oral cares.  Barriers to return to prior living situation: Dysphagia  Recommendations for discharge: TCU  Rationale for recommendations: Continue ST for dysphagia management. Consider repeat VFSS in 4-6 weeks pending progress         Entered by: Suad Lepe 06/01/2019 2:02 PM     Speech Language Therapy Discharge Summary    Reason for therapy discharge:    Discharged to transitional care facility.Planned discharged to TCU at 1600 today    Progress towards therapy goal(s). See goals on Care Plan in [facility] electronic health record for goal details.  Goals not met.  Barriers to achieving goals:   discharge from facility.    Therapy recommendation(s):    Continued therapy is recommended.  Rationale/Recommendations:  Patient discharged NPO with TF. Participates in oral-pharyngeal strengthening exercises. Pt needs continued ST for swallow function and potential repeat VFSS 4-6 weeks.      "

## 2019-06-01 NOTE — PROGRESS NOTES
Vascular Neurology Progress Note  6/1/2019     24 hr events: Pt feels fine this morning, no complaints. Was noted yesterday that the pt has left sided neglect. This was not present on admission, but was noted by OT on their assessments on 5/26 and on 5/28. Repeat head CT had been without increase in shift or SDH, and EEG has right cortical dysfunction but no seizures.      Exam:  Temp:  [97.3  F (36.3  C)-98.7  F (37.1  C)] 97.6  F (36.4  C)  Pulse:  [70] 70  Heart Rate:  [68-71] 70  Resp:  [16] 16  BP: (113-146)/(56-71) 134/67  SpO2:  [95 %-99 %] 99 %   Gen: pleasant, non distressed adult man  Resp: no distress  Extremities: no edema  Skin: bruising throughout  Neuro:  MS: awake, alert, follows commands. Left sided neglect to sensation as well as to motor commands and visual neglect that he is able to correct at times.  CN: question of L superior divison field cut. Decreased hearing. Face symmetric, EOM intact, mild dysarthria.   Motor: Mild L pronator drift, moves both legs equally, improves as pt focuses on the left.   Sensory: Intact to LT b/l.  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 Has been clinically stable/improving, but was noted to have left sided neglect in the recent days. EEG without seizures although there is dysfunction due to the SDH. Fluctuations in exam likely related to the SDH and fluid shifts.     Recommendations:  - OK to transfer to TCU, continue therapies  - SBP goal <180  - continue to hold anticoagulation until discussed in follow up  - NSG follow up in about 6 weeks    Seen and discussed with Dr. Aimee Pro MD  Stroke fellow

## 2019-06-01 NOTE — PLAN OF CARE
Alert, orientedx 1-2. Neuros with R gaze preference, L neglect,, L pronator drift, L hemiparesis: LUE 3/5, LLE 2/5, and LLE decreased sensation. VSS on RA- BP parameters less than 180. R knee warm swollen and painful- tylenol given, ice applied. Tele 100% V paced.  NPO- Tube feed infusing at 60mL/hr, flush 100mL every 4 hours. Lung sounds clear. Up with A2 lift. Turn and reposition side to side every 2 hours. Wound on back and coccyx covered with foam dressing. R knee scab open to air. Incontinent of urine. Plan for ortho consult. Possible discharge to TCU today.

## 2019-06-02 PROBLEM — J96.01 ACUTE RESPIRATORY FAILURE WITH HYPOXIA (H): Status: ACTIVE | Noted: 2019-01-01

## 2019-06-02 NOTE — ED NOTES
"[facility]  ED Nurse Handoff Report    ED Chief complaint: Respiratory Distress and Altered Mental Status      ED Diagnosis:   Final diagnoses:   None       Code Status: DNR / DNI    Allergies: No Known Allergies    Activity level - Baseline/Home:  Total Care    Activity Level - Current:   Total Care     Needed?: No    Isolation: No  Infection: Not Applicable  Bariatric?: No    Vital Signs:   Vitals:    06/01/19 2235 06/01/19 2245 06/01/19 2250 06/01/19 2253   BP: 126/62 125/86     Pulse: 86 81     Resp: (!) 53 (!) 54 (!) 41    Temp:    98.4  F (36.9  C)   TempSrc:    Temporal   SpO2: 96% 95% (!) 87%    Weight:           Cardiac Rhythm: ,   Cardiac  Cardiac Rhythm: Normal sinus rhythm    Pain level:      Is this patient confused?: agustín   Does this patient have a guardian?  No         If yes, is there guardianship documents in the Epic \"Code/ACP\" activity?  N/A         Guardian Notified?  N/A  [place] - Suicide Severity Rating Scale Completed?  No, secondary to unresponsive  If yes, what color did the patient score?  N/A    Patient Report: Initial Complaint: resp distress, altered loc  Focused Assessment: pt just discharged from this hospital today after being admitted for a subdural bleed. Was in a TCU, this evening developed increased sob, RR60s, decreased loc. sats 88 on RA. Placed on bipap. 14/7 at 100%. Pt remains unresponsive. Coarse lungs. Daughter here. Pt DNR/DNI.   Tests Performed: labs, xray  Abnormal Results:   Results for orders placed or performed during the hospital encounter of 06/01/19   Chest  XR, 1 view PORTABLE    Narrative    CHEST PORTABLE ONE VIEW 6/1/2019 10:40 PM     HISTORY: Respiratory distress.     COMPARISON: None.      Impression    IMPRESSION: Patchy opacities are seen throughout the right lung and at  the left lung base. Differential includes pneumonia or asymmetric  pulmonary edema. Probable small left greater than right pleural  effusions. Cardiac " silhouette borderline enlarged. No pneumothorax  visualized.    SEAN VALDEZ MD   Comprehensive metabolic panel   Result Value Ref Range    Sodium 140 133 - 144 mmol/L    Potassium 4.9 3.4 - 5.3 mmol/L    Chloride 106 94 - 109 mmol/L    Carbon Dioxide PENDING 20 - 32 mmol/L    Anion Gap PENDING 3 - 14 mmol/L    Glucose PENDING 70 - 99 mg/dL    Urea Nitrogen PENDING 7 - 30 mg/dL    Creatinine PENDING 0.66 - 1.25 mg/dL    GFR Estimate PENDING >60 mL/min/[1.73_m2]    GFR Estimate If Black PENDING >60 mL/min/[1.73_m2]    Calcium PENDING 8.5 - 10.1 mg/dL    Bilirubin Total PENDING 0.2 - 1.3 mg/dL    Albumin PENDING 3.4 - 5.0 g/dL    Protein Total PENDING 6.8 - 8.8 g/dL    Alkaline Phosphatase PENDING 40 - 150 U/L    ALT PENDING 0 - 70 U/L    AST PENDING 0 - 45 U/L   INR   Result Value Ref Range    INR 1.17 (H) 0.86 - 1.14   EKG 12-lead, tracing only   Result Value Ref Range    Interpretation ECG Click View Image link to view waveform and result    ISTAT gases lactate alexey POCT   Result Value Ref Range    Ph Venous 7.40 7.32 - 7.43 pH    PCO2 Venous 38 (L) 40 - 50 mm Hg    PO2 Venous 23 (L) 25 - 47 mm Hg    Bicarbonate Venous 24 21 - 28 mmol/L    O2 Sat Venous 42 %    Lactic Acid 4.5 (HH) 0.7 - 2.1 mmol/L     Treatments provided: meds    Family Comments: son and daughter    OBS brochure/video discussed/provided to patient/family: N/A              Name of person given brochure if not patient: na              Relationship to patient: na    ED Medications:   Medications   morphine (PF) injection 1-2 mg (has no administration in time range)   ondansetron (ZOFRAN-ODT) ODT tab 4 mg (has no administration in time range)     Or   ondansetron (ZOFRAN) injection 4 mg (has no administration in time range)   prochlorperazine (COMPAZINE) injection 5 mg (has no administration in time range)     Or   prochlorperazine (COMPAZINE) tablet 5 mg (has no administration in time range)     Or   prochlorperazine (COMPAZINE) Suppository 12.5 mg  (has no administration in time range)   metoclopramide (REGLAN) tablet 5 mg (has no administration in time range)     Or   metoclopramide (REGLAN) injection 5 mg (has no administration in time range)   LORazepam (ATIVAN) injection 0.5-1 mg (has no administration in time range)     Or   LORazepam (ATIVAN) tablet 0.5-1 mg (has no administration in time range)     Or   LORazepam (ATIVAN) tablet 0.5-1 mg (has no administration in time range)   hypromellose-dextran (ARTIFICAL TEARS) 0.1-0.3 % ophthalmic solution 1-2 drop (has no administration in time range)   morphine (PF) injection 4 mg (4 mg Intravenous Given 6/1/19 2807)       Drips infusing?:  No    For the majority of the shift this patient was Green.   Interventions performed were none.    Severe Sepsis OR Septic Shock Diagnosis Present: No    To be done/followed up on inpatient unit:  none    ED NURSE PHONE NUMBER: [phone]

## 2019-06-02 NOTE — PROVIDER NOTIFICATION
RECEIVING UNIT ED HANDOFF REVIEW    ED Nurse Handoff Report was reviewed by: Liliana Johnson on June 1, 2019 at 11:47 PM

## 2019-06-02 NOTE — PROGRESS NOTES
House Officer Death Pronouncement    Called by nursing staff to pronounce Jayson Leija dead.    Physical Exam: Unresponsive to noxious stimuli, Spontaneous respirations absent, Breath sounds absent, Heart sounds absent, Pupillary light reflex absent and Corneal blink reflex absent    Patient was pronounced dead at 1:35 AM, 2019.    No data filed        Active Problems:    Acute respiratory failure with hypoxia (H)       Infectious disease present?: NO    Communicable disease present? (examples: HIV, chicken pox, TB, Ebola, CJD) :  NO    Multi-drug resistant organism present? (example: MRSA): NO    Please consider an autopsy if any of the following exist:  NO Unexpected or unexplained death during or following any dental, medical, or surgical diagnostic treatment procedures.   NO Death of mother at or up to seven days after delivery.     NO All  and pediatric deaths.     NO Death where the cause is sufficiently obscure to delay completion of the death certificate.   NO Deaths in which autopsy would confirm a suspected illness/condition that would affect surviving family members or recipients of transplanted organs.     The following deaths must be reported to the 's Office:  YES A death that may be due entirely or in part to any factors other than natural disease (recent surgery, recent trauma, suspected abuse/neglect).   NO A death that may be an accident, suicide, or homicide.     NO Any sudden, unexpected death in which there is no prior history of significant heart disease or any other condition associated with sudden death.   NO A death under suspicious, unusual, or unexpected circumstances.    NO Any death which is apparently due to natural causes but in which the  does not have a personal physician familiar with the patient s medical history, social, or environmental situation or the circumstances of the terminal event.   NO Any death apparently due to Sudden Infant Death  Syndrome.     NO Deaths that occur during, in association with, or as consequences of a diagnostic, therapeutic, or anesthetic procedure.   NO Any death in which a fracture of a major bone has occurred within the past (6) six months.   YES A death of persons note seen by their physician within 120 days of demise.     NO Any death in which the  was an inmate of a public institution or was in the custody of Law Enforcement personnel.   NO  All unexpected deaths of children   NO Solid organ donors   NO Unidentified bodies   YES Deaths of persons whose bodies are to be cremated or otherwise disposed of so that the bodies will later be unavailable for examination;   NO Deaths unattended by a physician outside of a licensed healthcare facility or licensed residential hospice program   YES Deaths occurring within 24 hours of arrival to a health care facility if death is unexpected.    NO Deaths associated with the decedent s employment.   NO Deaths attributed to acts of terrorism.   YES Any death in which there is uncertainty as to whether it is a medical examiner s care should be discussed with the medical investigator.      Death Certificate to be directed to Dr. Gunner Correa, admitting hospitalist.  Attending physician, Dr. Gunner Correa, notified of death.    Body disposition: Autopsy was discussed with family member:  Son and Daughter in person.  Permission for autopsy was declined.  Body released to the morgue.    ISREAL Suarez, [facility]  House MATTHEW

## 2019-06-02 NOTE — ED NOTES
Removed bipap and placed patient on O2 via non rebreather mask at 10LPM. Patient transferred to room 831.

## 2019-06-02 NOTE — DISCHARGE SUMMARY
Discharge Summary  Hospitalist    Date of Admission:  6/1/2019  Date of Discharge/ Expiration:  6/2/2019  Discharging Provider: Gunner Correa MD    Primary Care Physician   [facility]  Primary Care Provider Phone Number: [phone]  Primary Care Provider Fax Number: [phone]    PRINCIPAL DIAGNOSIS  Acute hypoxic respiratory failure likely from aspiration pneumonia/pulmonary edema  Acute on chronic encephalopathy likely due to hypoxia, questionable intracranial pathology.    Past Medical History:   Diagnosis Date     Atrial fibrillation (H)      Chronic kidney disease      Diabetes (H)      Hypertension      Thyroid disease        History of Present Illness   Jayson Leija is an 89 year old male who presented with shortness of breath/worsening mental status.    Hospital Course     Jayson Leija is a 89 year old  male with medical history of traumatic subdural hematoma, suspected dementia, dysphagia, CKD stage III, sacral decubitus ulcer, chronic A. fib, nonsustained V. tach diabetes mellitus, hypothyroidism presented to the ED from TCU for worsening shortness of breath.     Patient admitted to [facility] from 5/17 to 6/1 for traumatic subdural hematoma, nonsustained V. tach, non-ST elevated MI. Hospital stay complicated by delirium/dysphagia and failure to thrive.  Palliative care was offered during hospitalization, patient had some improvement in mental status and family opted to pursue trial of rehabilitation with DNR/DNI status and transferred to TCU earlier this afternoon.     Acute hypoxic respiratory failure likely from aspiration pneumonia/pulmonary edema.  Elevated proBNP with volume overload.   Acute on chronic encephalopathy likely due to hypoxia, questionable intracranial pathology.  Upon arrival to TCU patient's mental status declined and respiratory rate increased prompting TCU staff to call nurse practitioner on call who recommended duo nebs which  did not help with breathing.  On EMS arrival respiratory rate 60, oxygen saturation in 80s.  Per patient's daughter noted that patient was not feeling that well today and she noted that he had slight cough this morning.     In the ED tachypneic respiratory rate of 50s with hypoxia and required BiPAP.  WBC 11.4, lactic acid 4.5.  Creatinine 2.24.  Troponin 0 0.183, proBNP 8100.  Chest x-ray showed patchy opacities throughout right lung and at light lung base.  Possible pneumonia or asymmetric pulmonary edema.  EKG showed accelerated junctional rhythm, heart rate of 87, QTc 418.    Patient's shortness of breath with respiratory failure likely from aspiration pneumonia/show pulmonary edema.  Also noted to have elevated proBNP and concerning for heart failure.  Patient had a recent subdural hematoma concern for any worsening bleed  In the ED patient on BiPAP, discuss goals of care with family.  Family opted for hospice care.  Patient transferred to inpatient on hospice, and couple of hours  at 1:35 AM on 2019.  Pronounced by house officer.     Acute on chronic large right-sided traumatic subdural hematoma with cerebral edema following unwitnessed fall.  Dysphagia due to intracranial hemorrhage on tube feeds..  Suspected dementia with possible delirium.  Chronic atrial fibrillation, history of complete heart block status post pacemaker placement currently not on anticoagulation.  Non-ST elevated MI due to demand ischemia-2019.  Nonsustained V. tach on 2019.  Right knee pain with effusion likely gout/pseudogout/osteoarthritis.   Electrolyte abnormalities with poor oral intake.  CKD stage III-IV.  Sacral wound is unstageable.  Insulin-dependent diabetes mellitus.  Hypothyroidism.  Hypertension.  Physical deconditioning/failure to thrive.  Patient's family opted for hospice care.  Respect goals of care.      Gunner Correa MD

## 2019-06-02 NOTE — PROCEDURES
Procedure Date: 05/31/2019      LONG-TERM ELECTROENCEPHALOGRAM WITH VIDEO           LTV EEG # SSH  (Day 1)      SOURCE FILE DURATION:  Start time was on 05/31/2019 at 15:42.  End date was 06/01/2019 at 12:52.  Total duration time was approximately 25 hours, 30 minutes.      PATIENT INFORMATION:  An 89-year-old male on chronic anticoagulation, presented after being found down at home, found to have a large right-sided subdural hematoma with cerebral edema.  EEG is being done to evaluate for seizures.     TECHNICAL SUMMARY: This video EEG monitoring procedure was performed with 23 scalp electrodes in 10-20 system placements, and additional scalp, precordial and other surface electrodes used for electrical referencing and artifact detection. Video was reviewed intermittently by EEG technologist and physician for electroclinical seizures.      BACKGROUND:  The patient has continuous right hemispheric slowing.  They are in the delta-theta range that is anteriorly more; anterior quadrants are slower.  The posterior quadrants have faster frequencies, into the theta range, up to 7-8 Hz.  There is a parietooccipital rhythm in the right posterior quadrant near 7-8 Hz.  In the left posterior quadrant we see a parietooccipital rhythm of 8-9 Hz.  There is burst of intermittent generalized delta-theta slowing noted in less than 10% of the record.      ACTIVATION PROCEDURES:  Photic stimulation and hyperventilations not done.      EPILEPTIFORM DISCHARGES:  None.      ICTAL:  None.      IMPRESSION:  LTV day 1 is abnormal due to the presence of continuous right hemispheric slowing that is more predominant in the anterior leads and intermittent generalized theta slowing.  These findings are consistent with a mild encephalopathy with a cortical lesion in the right hemisphere.  This is consistent with patient's history of a right subdural hematoma with surrounding edema, which would account for this degree of slowing on the  right.  No electrographic seizures or epileptiform discharges were seen.         CARSON CHAN MD             D: 2019   T: 2019   MT: KAYLA      Name:     IZAIAH MARKHAM   MRN:      [id number]        Account:        [id number]   :      1929           Procedure Date: 2019      Document: [id number]

## 2019-06-02 NOTE — PROGRESS NOTES
SPIRITUAL HEALTH SERVICES Progress Note  [facility] ED    Visit per page from ED staff.  Family requested prayer and end of life ritual for pt. Pt's children present at bedside.   provided prayer and scripture, assurance of God's presence with pt and with family.   at bedside, talked with family about removing BiPap, and family agreed that was what should be done.  Family very teary, and expressed grief that pt had become this sick.  Pt moved from ED to 88.   remains available as needs arise and upon request.      Mary Sierra  Chaplain Resident

## 2019-06-02 NOTE — ED NOTES
Bed: ST01  Expected date:   Expected time:   Means of arrival:   Comments:  Luz Maria [phone]- [phone]- resp distress

## 2019-06-02 NOTE — ED PROVIDER NOTES
History     Chief Complaint:  Respiratory Distress and Altered Mental Status    The history is provided by the EMS personnel and a relative.      Jayson Leija is an anti-coagulated 89 year old male on Coumadin with a history of subdural hematoma, atrial fibrillation, hypertension, chronic kidney disease, and type 2 diabetes, who presents with difficulty breathing. The patient is DNR/DNI. Two weeks ago on 5/17/19 the patient was brought to [facility] ED for a fall and was found to have a right-sided subdural hematoma and eventually transferred to [facility] for admission. The patient was discharged earlier today and went to [facility] TCU. Upon arrival to the TCU, the patient's mental status decline and his respiratory rate increased, prompting TCU staff to call the nurse practitioner on call who recommended three duonebs which did not help him with his breathing rate. On EMS arrival, the patient had 60 breaths per min and O2 saturation was 80%. According to the patient's daughter, the patient was not feeling that well today after being discharged and she noticed he had slight cough around 1100.     Allergies:  No Known Allergies     Medications:    Norvasc  Insulin aspart  Insulin glargine  Levothyroxine  Metoprolol  Coumadin  Lisinopril  Pravastatin    Past Medical History:    Atrial fibrillation (H)   Anemia  Choledocholithiasis  Chronic kidney disease   Diabetes type 2  Hypertension   Hyperthyroidism  Thyroid disease  Thrombocytopenia  Coronary artery disease  Hypercalcemia  Diverticula of colon    Past Surgical History:    IR Gastrostomy tube percutaneous placement  Thyroidectomy  Pacemaker placement  Gallbladder surgery  REMV cataract intracap insert lens    Family History:    Liver disease  Ophthalmic disease  Cancer  Cardiovascular disease  Hypertension    Social History:  Smoking status: No  Alcohol use: Yes  Drug use: No  PCP: [facility]  Presents to the ED with  daughter    Review of Systems   Unable to perform ROS: Acuity of condition         Physical Exam     Patient Vitals for the past 24 hrs:   BP Temp Temp src Pulse Heart Rate Resp SpO2 Weight   06/02/19 0023 -- -- -- -- -- (!) 52 -- --   06/01/19 2330 127/54 -- -- 84 84 (!) 42 100 % --   06/01/19 2315 125/59 -- -- 83 83 (!) 35 92 % --   06/01/19 2300 130/66 -- -- 86 84 (!) 37 96 % --   06/01/19 2253 -- 98.4  F (36.9  C) Temporal -- -- -- -- --   06/01/19 2250 -- -- -- -- 87 (!) 41 (!) 87 % --   06/01/19 2245 125/86 -- -- 81 81 (!) 54 95 % --   06/01/19 2235 126/62 -- -- 86 85 (!) 53 96 % --   06/01/19 2230 133/61 -- -- -- 86 (!) 55 99 % --   06/01/19 2228 -- -- -- -- -- -- 96 % --   06/01/19 2225 133/61 -- -- -- 85 (!) 60 95 % 73.4 kg (161 lb 13.1 oz)       Physical Exam  General: Unresponsive  Head: No signs of trauma.   CV: Normal rate and regular rhythm.    Resp: Tachypnea and coarse breath sounds.  GI: Soft. There is no apparent tenderness.    MSK: No bony deformities.  Neuro: The patient does not respond to verbal or painful stimuli.   Skin: Skin is warm and dry.       Emergency Department Course   ECG (22:29:46):  Rate 87 bpm. WA interval *. QRS duration 84. QT/QTc 348/418. P-R-T axes * -23 43.  Accelerated Junctional rhythm. Inferior infarct, age undetermined. Abnormal ECG. Agree with computer interpretation. Interpreted at 2229  by Klever Weinberg MD.    Imaging:  Radiographic findings were communicated with the family who voiced understanding of the findings.    XR Chest G/E 1 view   Patchy opacities are seen throughout the right lung and at  the left lung base. Differential includes pneumonia or asymmetric  pulmonary edema. Probable small left greater than right pleural  effusions. Cardiac silhouette borderline enlarged. No pneumothorax  visualized.   As per radiology.     Laboratory:  2239:  ISTAT Lactate: pH 7.40, pCO2 38 (L), pO2 23 (L), Bicarbonate 24, Lactic Acid 4.5 (H)  CBC: WBC 11.4 (H), HGB 11.6  (L),   CMP: Glucose 170 (H), Urea Nitrogen 49 (H), Creatinine 2.24 (H), GFR 25 (L), Alkaline Phosphate 151 (H)   Troponin (2229): 0.183  Nt probnp inpatient: 8100 (H)  INR: 1.17(H)    Procedures:  POC US Chest B-Scan                                POC US CHEST B-SCAN   Preliminary Result   [facility] Procedure Note        Limited Bedside ED Cardiac Ultrasound:      PROCEDURE: PERFORMED BY: Dr. Klever Weinberg   INDICATIONS/SYMPTOM:  Shortness of Breath   PROBE: Cardiac phased array probe   BODY LOCATION: Chest   FINDINGS:    The ultrasound was performed utilizing the parasternal long axis and apical 4 chamber views.   Cardiac contractility:  Present   Gross estimation of cardiac kinesis: severely depressed   Pericardial Effusion:  None   RV:LV ratio: LV > RV   INTERPRETATION:    Noticeable decrease in EF. No pericardial effusion was found.     IMAGE DOCUMENTATION: Images were archived to PACs system.                  Interventions:  2247: Morphine 4 mg IV injection    Emergency Department Course:  2222: Patient arrived via EMS. Nursing notes and vitals reviewed. I performed an exam of the patient as documented above.     IV inserted. Medicine administered as documented above. Blood drawn. This was sent to the lab for further testing, results above.    2229: I performed a POC Ultrasound, procedure noted above. An EKG was completed, results above.     2232: A Chest X-ray was done, results above.     2241: I discussed the patient's condition with the daughter, from ultrasound and x-ray results it appears that his heart is not pumping enough blood to his body. We discussed the type of measures that the patient would like to have done at this time, and whether to be aggressive with treatment. The patient's daughter stated she would like to have him be comfortable.     2321: I rechecked the patient and discussed the results of his workup thus far.     2329:  I consulted with Dr. Correa of the  hospitalist services. They are in agreement to accept the patient for admission.    Findings and plan explained to the patient's family who consents to admission. Discussed the patient with Dr. Correa, who will admit the patient to an oncology bed for further monitoring, evaluation, and treatment.    Impression & Plan    Medical Decision Making:  Jayson Leija is an 89-year-old gentleman who presents due to altered mental status and respiratory distress.  The patient had just been discharged from the hospital earlier in the day after having been admitted for approximately 2 weeks due to a subdural hematoma and other complications.  During his hospital stay, comfort measures had been discussed but apparently the patient had some improvement of condition and it was decided to do a trial of restorative therapy.  He had been discharged to a TCU and apparently had a rapid decline this evening.  On presentation to the ER, he was unresponsive and quite tachypneic.  He had been noted to be quite hypoxic with EMS and was started on BiPAP which was continued in the ER.  While this did help with his oxygenation levels, he was still somewhat hypoxic. He had very coarse breath sounds and chest x-ray did show patchy fluid to the bilateral lungs which could be pneumonia versus heart failure.  I did do a bedside ultrasound which did show a decreased cardiac squeeze and his BNP was significantly elevated, both of which would go along with the heart failure.  The patient's daughter did immediately arrive in the ER and I discussed goals of care with her.  It was decided to proceed with comfort measures at this time.  Additional family members did arrive and everyone agrees with this plan.  Patient was given morphine to help with his respiratory drive and ultimately admitted to the hospitalist service for continued comfort measures.      Diagnosis:    ICD-10-CM   1. Respiratory distress R06.03   2. Hypoxia R09.02      Disposition:  Admitted to Dr. Correa    Scribe Disclosure  I, Aliza Joe, am serving as a scribe at 10:22 AM on 6/2/2019 to document services personally performed by lKever Weinberg MD based on my observations and the provider's statements to me.     Aliza Joe  6/1/2019    EMERGENCY DEPARTMENT       Klever Weinberg MD  06/03/19 0633

## 2019-06-02 NOTE — ED NOTES
Dr Weinberg talking with pt daughter, pt is a DNR/DNI. Pt RR in 60s, given MS, RR down to 30s. Will monitor. Pt comfort cares.

## 2019-06-02 NOTE — H&P
[facility]    History and Physical  Hospitalist    Jayson Leija MRN# [id number]   Age: 89 year old YOB: 1929     Date of Admission:  6/1/2019    Primary care provider: [facility]          Assessment and Plan:     Jayson Leija is a 89 year old  male with medical history of traumatic subdural hematoma, suspected dementia, dysphagia, CKD stage III, sacral decubitus ulcer, chronic A. fib, nonsustained V. tach diabetes mellitus, hypothyroidism presented to the ED from TCU for worsening shortness of breath.    Patient admitted to [facility] from 5/17 to 6/1 for traumatic subdural hematoma, nonsustained V. tach, non-ST elevated MI. Hospital stay complicated by delirium/dysphagia and failure to thrive.  Palliative care was offered during hospitalization, patient had some improvement in mental status and family opted to pursue trial of rehabilitation with DNR/DNI status and transferred to TCU earlier this afternoon.    Acute hypoxic respiratory failure likely from aspiration pneumonia/pulmonary edema.  Elevated proBNP with volume overload.   Acute on chronic encephalopathy likely due to hypoxia, questionable intracranial pathology.  Upon arrival to TCU patient's mental status declined and respiratory rate increased prompting TCU staff to call nurse practitioner on call who recommended duo nebs which did not help with breathing.  On EMS arrival respiratory rate 60, oxygen saturation in 80s.  Per patient's daughter noted that patient was not feeling that well today and she noted that he had slight cough this morning.    In the ED tachypneic respiratory rate of 50s with hypoxia and required BiPAP.  WBC 11.4, lactic acid 4.5.  Creatinine 2.24.  Troponin 0 0.183, proBNP 8100.  Chest x-ray showed patchy opacities throughout right lung and at light lung base.  Possible pneumonia or asymmetric pulmonary edema.  EKG showed accelerated junctional rhythm,  heart rate of 87, QTc 418.  Patient's shortness of breath with respiratory failure likely from aspiration pneumonia/show pulmonary edema.  Also noted to have elevated proBNP and concerning for heart failure.  Patient had a recent subdural hematoma concern for any worsening bleed, discussed with family declined any further imaging.  In the ED on BiPAP, ED team and discussed with family and patient contemplating hospice care.  I have met with patient's multiple family members by bedside, discussed medical interventions/comfort care.  Patient's family opted for comfort care with no medical intervention at this time.  No further blood draws, no further imaging.  PRN morphine, PRN Ativan, as needed troponin ordered.   consult requested.    Acute on chronic large right-sided traumatic subdural hematoma with cerebral edema following unwitnessed fall.  Dysphagia due to intracranial hemorrhage on tube feeds.  Suspected dementia with possible delirium.  Chronic atrial fibrillation, history of complete heart block status post pacemaker placement currently not on anticoagulation.  Non-ST elevated MI due to demand ischemia-5/7/2019.  Nonsustained V. tach on 5/22/2019.  Right knee pain with effusion likely gout/pseudogout/osteoarthritis.   Electrolyte abnormalities with poor oral intake.  CKD stage III-IV.  Sacral wound is unstageable.  Insulin-dependent diabetes mellitus.  Hypothyroidism.  Hypertension.  Physical deconditioning/failure to thrive.  Please refer to discharge summary from earlier this morning for details.  Patient's family opted for hospice care.  Respect goals of care.    DVT Prophylaxis: comfort care  Code Status: comfort care DNR / DNI     Disposition: Expected discharge pending clinical course, SW intervention   Patient, multiple family members, interdisciplinary team involved in care and agrees with plan.    Total time - 40 minutes. More than 70% of time spent in direct patient care, care  coordination, caregiver counseling, and formalizing plan of care.    Gunner Correa MD          Chief Complaint:     History is obtained from medical and ED records     Jayson Leija is a 89 year old  male with medical history of traumatic subdural hematoma, suspected dementia, dysphagia, CKD stage III, sacral decubitus ulcer, chronic A. fib, nonsustained V. tach diabetes mellitus, hypothyroidism presented to the ED from TCU for worsening shortness of breath.    Patient admitted to [facility] from 5/17 to 6/1 for traumatic subdural hematoma, nonsustained V. tach, non-ST elevated MI. Hospital stay complicated by delirium/dysphagia and failure to thrive.  Palliative care was offered during hospitalization, patient had some improvement in mental status and family opted to pursue trial of rehabilitation with DNR/DNI status and transferred to TCU earlier this afternoon.  Upon arrival to TCU patient's mental status declined and respiratory rate increased prompting TCU staff to call nurse practitioner on call who recommended duo nebs which did not help with breathing.  On EMS arrival respiratory rate 60, oxygen saturation in 80s.  Per patient's daughter noted that patient was not feeling that well today and she noted that he had slight cough this morning.    In the ED tachypneic respiratory rate of 50s with hypoxia and required BiPAP.  WBC 11.4, lactic acid 4.5.  Creatinine 2.24.  Troponin 0 0.183, proBNP 8100.  Chest x-ray showed patchy opacities throughout right lung and at light lung base.  Possible pneumonia or asymmetric pulmonary edema.  EKG showed accelerated junctional rhythm, heart rate of 87, QTc 418.            Review of Systems:     Unable to obtain due to underlying respiratory failure     Medical History:     Past Medical History:   Diagnosis Date     Atrial fibrillation (H)      Chronic kidney disease      Diabetes (H)      Hypertension      Thyroid disease         Surgical History:     "  Past Surgical History:   Procedure Laterality Date     IR GASTROSTOMY TUBE PERCUTANEOUS PLCMNT  5/29/2019             Social History:      Social History     Tobacco Use     Smoking status: Never Smoker     Smokeless tobacco: Never Used   Substance Use Topics     Alcohol use: Yes     Comment: \"very, very rarely\"             Family History:   Reviewed FH, no pertinent FH to HPI          Allergies:   No Known Allergies          Medications:   Home meds reviewed          Physical Exam      Admission Weight: 73.4 kg (161 lb 13.1 oz)  Current Weight: 73.4 kg (161 lb 13.1 oz)    Vital Signs with Ranges  Temp:  [97.6  F (36.4  C)-98.7  F (37.1  C)] 98.4  F (36.9  C)  Pulse:  [81-86] 83  Heart Rate:  [68-87] 83  Resp:  [16-60] 35  BP: (113-135)/(58-86) 125/59  FiO2 (%):  [50 %] 50 %  SpO2:  [87 %-99 %] 92 %    PHYSICAL EXAM  GENERAL: Patient is in respiratory distress, on BIPAP.   HEENT Pupils equal  HEART: Regular rate and rhythm. S1S2. systolic murmurs right sternal border  LUNGS: Bilateral decreased breath sounds. tachypneic  ABDOMEN: Soft, no abdominal tenderness, bowel sounds heard   NEURO: unresponsive       EXTREMITIES: No pedal edema. 2+ peripheral pulses.  SKIN: Warm, dry. No rash or bruising.  PSYCHIATRY Cooperative         Data:   All new lab and imaging data was reviewed.     "

## 2019-06-02 NOTE — ED NOTES
DATE:  6/1/2019   TIME OF RECEIPT FROM LAB:  5146  LAB TEST:  troponin  LAB VALUE:  0.183  RESULTS GIVEN WITH READ-BACK TO (PROVIDER):  Dr. Weinberg  TIME LAB VALUE REPORTED TO PROVIDER:   9858

## 2019-06-02 NOTE — ED NOTES
Resp rate 56 prior to administration of Lorazepam. Now 43.  is here with patient and family at this time.

## 2019-09-10 NOTE — DISCHARGE SUMMARY
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 with cerebral edema following unwitnessed fall  Suspected dementia with possible delirium/encephalopathy  Dysphasia  Hyponatremia  MORENA on CKD stage III iV  Mild rhabdomyolysis  Sacral wound, unstageable  Chronic atrial fibrillation  Nonsustained ventricular tachycardia  Non-STEMI type II  Diabetes mellitus type 2 without complication  Hypothyroidism  Knee pain and effusion    Hospital course:  Jayson Leija is a 89 year old male with hx of chronic a-fib/complete heart block s/p PPM on chronic anticoagulation with warfarin who initially presented to [facility] on 5/17/2019 after being found down at home. He was found to have a large right-sided subdural hematoma, and was transferred to [facility] for further evaluation and management. 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 CT head on arrival showed large right-sided subdural hematoma felt to represent acute on chronic hemorrhage with associated midline shift and mass effect. In the ED, he was given KCentra, vitamin K, and mannitol. Neurosurgery and neurocritical care were consulted on admission, and the decision was made with the family to manage the bleed medically and avoid surgery. He was initiated on a nicardipine infusion for strict blood pressure control. Serial head CTs have been stable without increase in bleed. Family was initially leaning toward strict comfort cares, but since patient's mental status improved, they would now like to pursue restorative cares (short of surgery). He is DNR/DNI  - Residual left-sided weakness, dysphagia, and cognitive impairment, slowly  improving.  - concern for mild left sided neglect CT head w/o contrast ordered 5/31 and was unchanged  - BP: Goal normotension. 5/20 increased amlodipine from 2.5 to 5. BP well controlled at this time.  - Warfarin has been discontinued indefinitely  - PT/OT/SLP recommending TCU  - Follow up with Neurosurgery in 6 weeks  -  Failed video swallow study. G tube placed  by IR on 5/29. Tolerating tube feeds        RIGHT KNEE PAIN:  Right knee is swollen and warm to touch with effusion on exam   Ortho consult placed 5/31: recommendations   dedicated films of right knee to r/o fracture: findings knee osteophytosis with near  complete loss of joint space in the lateral compartment. Chondrocalcinosis is also seen in all 3 compartments. Large knee effusion.  Per ortho:   Non operative management. Could be gout or pseudogout vs Osteoarthritis. Per ortho conservative management for now  Activity as tolerated to bilateral lower extremities   ICE  ACE wrap   Pain control   Ortho follow up to evaluate for steroid injection or tap depending on evolution     Suspected dementia with possible delirium  Patient's family reports cognitive decline for several months PTA. He was somnolent and difficult to arouse on admission. His level of consciousness has improved, but he remains disoriented to situation with short term memory. SLUMS 10/30 per OT.   - On delirium protocol. Have been holding antipsychotics for now unless patient becomes agitated     Dysphagia due to ICH  He has been evaluated by SLP who has recommended alternative means of nutrition due to observed jocelyne aspiration. Discussed nutrition options with patient and family, and they have decided to pursue tube feeds. Patient will try an NJ tube first, and if he tolerates this (e.g, doesn't pull it out), will likely pursue PEG tube prior to discharge. If he pulls it out and/or decides that does not want a feeding tube, patient and family are open to accepting the risk of  aspiration and following the safest possible diet as recommended by SLP. If he tries a modified diet and doesn't want to continue, they are open to focusing on comfort and allowing him to eat for pleasure  - NJ tube placed by IR on 5/20  - tube feeds currently at goal of 60 ml/hr  G tube placement done by ir   Severe malnutrition/In Context of:  Acute illness or injury                                               Environmental or social circumstances                                               % Weight Loss:  > 2% in 1 week (severe malnutrition)                                               % Intake:  </= 50% for >/= 5 days (severe malnutrition)                                               Muscle Loss:  Temporal region mild depletion and Clavicle bone region  mild depletion  On tube feeds now      Hypernatremia  Na 151, down from 156 on 5/20, due to lack of free water intake while NPO  - on 5/20 had D5W for 500 ml, now discontinued.  - increased free water flushes from 100 ml every 4 hours, will like to keep the sodium on the higher side of normal.  Sodium improved today. 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 Suspect pre-renal state due to decreased intake. Improved with IV fluids  - Cr now at baseline at 1.74, 1.97 on 6/1  -Monitor renal function     Mild rhabdomyolysis  CK level on admission 2190, due to prolonged period of being down. Improved appropriately with IV fluids  - Holding PTA pravastatin     Sacral wounds, unstageable  Present on admission. [place] by WOC RN to be multifactorial from fall, friction from scooting, and probable pressure components  - Wound cares in place as per WOC RN  Plan of care for wound located on upper mid back and sacral wounds: every other day, even days and prn  1. Clean wounds with saline or MicKlenz Spray, pat dry  2. Paint with No Sting Skin Prep (#[id number])) and allow to dry thoroughly  3. Apply  "light smear of Iodosorb Gel (#[id number]) to both wounds  4. Press a Mepilex Sacral Dressing (PS#[id number]) to both wounds- making sure to conform nicely to skin curvatures   (begin placing the Mepilex at the most distal aspect first, smooth into place in an upward direction, then smooth side to side)       To the back wound apply parallel strips of tape to the dressing to help keep tacked down.   5. Time and date dressing change and isabela with a \"T\" for treatment of a wound  6. Reposition pt every 1 to 2 hours when in bed and hourly when up to the chair to relieve pressure and promote perfusion to tissue.  POSITION PT SIDE TO SIDE ONLY WHEN IN BED, KEEP HEELS ELEVATED AT ALL TIMES.     NOTE:  Iodosorb Gel should not be used longer than 14 days. After 14 days the gel should be stopped and a new plan established, this may mean only a simple, gauze dressing.  The Iodosorb Gel should be stopped after use on June 7, 2019.       Chronic atrial fibrillation  History of complete heart block s/p PPM  - Warfarin has been discontinued indefinitely     NSTEMI type 2 due to demand ischemia  Nonsustained ventricular tachycardia  Troponin 0.15 on arrival. Felt to be due to demand ischemia in setting of acute SDH and from being down.  - 14 beat run early AM on 5/22  - Electrolytes unremarkable  - Started on metoprolol 12.5 mg bid  - Stable at this time.     Essential hypertension  - BP well controlled on Amlodipine and metoprolol     DM2 without complications  Diet-controlled. A1c 8.5. Blood sugars were initially elevated to the mid 200-300 range, and he was started on Lantus, but this was discontinued (5/19) due to progressive downward trend in blood sugars.  -  severe uncontrolled hyperglycemia to mid 200's to 300's  -  increased  lantus to  40 units daily on 6/1, and increased to high intensity sliding scale  -  Continue to monitor her glucose and adjust as needed. Glucose within target so far today     Hypothyroidism  TSH 2.42 on " admission  - Continues on PTA levothyroxine 100 mcg daily    Follow-ups Needed After Discharge       Unresulted Labs Ordered in the Past 30 Days of this Admission     No orders found from 3/18/2019 to 5/18/2019.      These results will be followed up by NA    Discharge Disposition   Discharged to short-term care facility  Condition at discharge: Stable    Consultations This Hospital Stay   PHYSICAL THERAPY ADULT IP CONSULT  OCCUPATIONAL THERAPY ADULT IP CONSULT  NEUROSURGERY IP CONSULT  NEUROLOGY IP CONSULT  SPEECH LANGUAGE PATH ADULT IP CONSULT  PALLIATIVE CARE ADULT IP CONSULT  WOUND OSTOMY CONTINENCE NURSE  IP CONSULT  NUTRITION SERVICES ADULT IP CONSULT  CARE TRANSITION RN/SW IP CONSULT  PHARMACY IP CONSULT  SPEECH LANGUAGE PATH ADULT IP CONSULT  SOCIAL WORK IP CONSULT  ORTHOPEDIC SURGERY IP CONSULT    Code Status   DNR/DNI    Time Spent on this Encounter   I, Dao James, personally saw the patient today and spent greater than 30 minutes discharging this patient.       Dao James MD  [facility]  ______________________________________________________________________    Physical Exam   Vital Signs: Temp: 98.7  F (37.1  C) Temp src: Tympanic BP: 135/71 Pulse: 70 Heart Rate: 71 Resp: 16 SpO2: 99 % O2 Device: None (Room air)    Weight: 160 lbs 14.97 oz  Gen: Well nourished, well developed, alert and oriented x 3, NAD  HEENT: Atraumatic, normocephalic  Lungs: Clear to ausculation without wheezes, rhonchi, or rales  Heart: Regular rate and rhythm, no gallops or rubs, no murmurs  GI: Bowel sound normal, no hepatosplenomegaly or masses  Lymph: No lymphadenopathy or edema  Skin: No rashes , sacral and midspine decubitus ulcer       Primary Care Physician   [facility]    Discharge Orders      CT Head w/o contrast*     Follow Up and recommended labs and tests    TCU to schedule ASAP:  F/u with [facility] in 4 weeks, new head CT prior.    [phone]       Significant Results and Procedures   Most Recent 3 CBC's:  Recent Labs   Lab Test 05/29/19  0600 05/26/19  0830 05/21/19  0812   WBC 10.7 9.6 8.5   HGB 10.3* 10.6* 12.8*   MCV 90 91 92    156 141*     Most Recent 3 BMP's:  Recent Labs   Lab Test 06/01/19  0831 05/29/19  0600 05/28/19  0714    140 144   POTASSIUM 4.2 3.9 3.8   CHLORIDE 108 108 111*   CO2 27 27 28   BUN 47* 31* 36*   CR 1.97* 1.74* 1.71*   ANIONGAP 6 5 5   [place] 9.0 8.1* 8.7   * 162* 203*     Most Recent 2 LFT's:  Recent Labs   Lab Test 05/18/19  0428 05/17/19  1402   AST 38 93*   ALT 31 44   ALKPHOS 64 82   BILITOTAL 2.9* 3.4*     Most Recent 3 INR's:  Recent Labs   Lab Test 05/29/19  0600 05/18/19  0428 05/17/19 2006   INR 1.08 1.21* 1.21*     Most Recent INR's and Anticoagulation Dosing History:  Anticoagulation Dose History     Recent Dosing and Labs Latest Ref Rng & Units 5/17/2019 5/17/2019 5/18/2019 5/29/2019    INR 0.86 - 1.14 2.48(H) 1.21(H) 1.21(H) 1.08        Most Recent 3 Creatinines:  Recent Labs   Lab Test 06/01/19  0831 05/29/19  0600 05/28/19  0714   CR 1.97* 1.74* 1.71*     Most Recent 3 Hemoglobins:  Recent Labs   Lab Test 05/29/19  0600 05/26/19  0830 05/21/19  0812   HGB 10.3* 10.6* 12.8*     Most Recent 3 Troponin's:  Recent Labs   Lab Test 05/17/19  1442   TROPONIN 0.15*     Most Recent 3 BNP's:No lab results found.  Most Recent D-dimer:No lab results found.  Most Recent Cholesterol Panel:No lab results found.  Most Recent 6 Bacteria Isolates From Any Culture (See EPIC Reports for Culture Details):No lab results found.  Most Recent TSH and T4:  Recent Labs   Lab Test 05/17/19  1402   TSH 2.42     Most Recent Hemoglobin A1c:  Recent Labs   Lab Test 05/18/19  0428   A1C 8.5*     Most Recent 6 glucoses:  Recent Labs   Lab Test 06/01/19  0831 05/29/19  0600 05/28/19  0714 05/27/19  0737 05/26/19  0830 05/25/19  0614   * 162* 203* 166* 133* 241*     Most Recent Urinalysis:  Recent Labs    Lab Test 05/17/19  1551   COLOR Yellow   APPEARANCE Clear   URINEGLC >1000*   URINEBILI Negative   URINEKETONE Trace*   SG 1.020   UBLD Trace*   URINEPH 5.5   PROTEIN 200*   NITRITE Negative   LEUKEST Negative   RBCU 3*   WBCU 2     Most Recent ABG:No lab results found.  Most Recent ESR & CRP:No lab results found.  Most Recent Anemia Panel:  Recent Labs   Lab Test 05/29/19  0600   WBC 10.7   HGB 10.3*   HCT 30.3*   MCV 90        Most Recent CPK:  Recent Labs   Lab Test 05/18/19  0428 05/17/19  1402   * 2,190*   ,   Results for orders placed or performed during the hospital encounter of 05/17/19   CT Head w/o Contrast    Narrative    CT SCAN OF THE HEAD WITHOUT CONTRAST   5/17/2019 10:26 PM     HISTORY: Reevaluate subdural.    TECHNIQUE:  Axial images of the head and coronal reformations without  IV contrast material. Radiation dose for this scan was reduced using  automated exposure control, adjustment of the mA and/or kV according  to patient size, or iterative reconstruction technique.    COMPARISON: Head CT 5/17/2019.    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 The degree of right to left midline shift (7 mm)  and subfalcine herniation are unchanged. No evidence of uncal  herniation. Basilar cisterns remain patent. No new sites of hemorrhage  are identified. Volume loss and patchy white matter hypoattenuation  likely represent chronic small vessel ischemic change, unchanged.  Subcentimeter area of hyperattenuation within the right insula is  unchanged, probably old lacunar infarct. The calvarium, skull base,  sinuses, and extra cranial soft tissues are unremarkable.      Impression    IMPRESSION: No change of right convexity acute on chronic subdural  hematoma with mild mass effect and midline shift.    AL LAGOS MD   CT Head w/o Contrast    Narrative    CT SCAN OF THE HEAD WITHOUT  CONTRAST   5/19/2019 5:21 AM     HISTORY: Intracranial hemorrhage, known, follow up.    TECHNIQUE:  Axial images of the head and coronal reformations without  IV contrast material. Radiation dose for this scan was reduced using  automated exposure control, adjustment of the mA and/or kV according  to patient size, or iterative reconstruction technique.    COMPARISON: Multiple head CTs 5/17/2019.    FINDINGS:  Right convexity subdural hematoma is unchanged in size  compared to the prior exam again measuring approximately 2.7 cm in  maximum thickness. The degree of mass effect on the right cerebral  hemisphere, slight sub-pulsing, and slight (7 mm) of right-to-left  midline shift is unchanged. No new sites of hemorrhage. Background of  volume loss is present with patchy white matter hypoattenuation likely  representing chronic small vessel ischemic change. No evidence of  acute ischemia. The calvarium, skull base, paranasal sinuses, and  extracranial soft tissues are unremarkable.      Impression    IMPRESSION:   No change of right convexity subacute on chronic  subdural hematoma with mild mass effect and midline shift.    AL LAGOS MD   XR Video Swallow w/o Esophagram    Narrative    VIDEO SWALLOWING EVALUATION   5/19/2019 10:16 AM     HISTORY: Dysphagia, aspiration risk.    COMPARISON: None.    FLUOROSCOPY TIME: 2.4 minutes.  SPOT IMAGES OR CINE RUNS: 6.    FINDINGS:    Thin: Aspiration (silent).    Nectar: Aspiration (silent).    Honey: Penetration and eventual aspiration of probable residue.    Pudding: Penetration. No aspiration.    Semisolid: Not administered.    Solid: Not administered.    This study only includes the cervical esophagus.    AL LAGOS MD   XR Feeding Tube Placement    Narrative    FEEDING TUBE PLACEMENT   5/20/2019 2:24 PM    HISTORY: Failed video swallow.    COMPARISON: None    FINDINGS: 2.8 minutes of fluoroscopy were utilized for placement of a  feeding tube.  At the final  position, the feeding tube tip was distal  duodenum.  15 mL of contrast was injected to confirm placement.  The  tube was marked at the level of the nose at the 88 cm length.   Estimated blood loss during the procedure was less than 5 mL. No  specimens collected. There were no complications of the procedure.    Medications used: 5 mL Lidocaine jelly, 15 mL Omnipaque 240    Images Obtained: 1      Impression    IMPRESSION: Successful feeding tube placement.    CLAYTON HUMPHREY PA-C   XR Abdomen 1 View    Narrative    XR ABDOMEN ONE VIEW  5/26/2019 9:22 AM     COMPARISON: None.    HISTORY: Tube feeding placement.    FINDINGS: Tip of the feeding tube is in the mid third portion of the  duodenum. Abdominal bowel gas pattern is nonspecific. There is no  evidence for free intraperitoneal air.      Impression    IMPRESSION: No evidence for bowel obstruction or free air.    RK CHILDRESS MD   XR Abdomen 1 View    Narrative    XR ABDOMEN ONE VIEW 5/26/2019 10:08 AM     COMPARISON: Frontal view of the abdomen same day.    HISTORY: Tube feeding placement.      FINDINGS: Tip of the feeding tube remains in the mid third portion of  the duodenum. Abdominal bowel gas pattern is nonspecific. There is no  evidence for free intraperitoneal air.      Impression    IMPRESSION: No evidence for bowel obstruction or free air.    RK CHILDRESS MD   XR Feeding Tube Placement    Narrative    FEEDING TUBE PLACEMENT 5/27/2019 10:40 AM    COMPARISON: None.    HISTORY: Feeding tube replacement, current NG occluded.    CONTRAST: 10 mL Isovue 240.    MEDICATIONS: 4 mL viscous lidocaine.    FLUOROSCOPY TIME: 3.0 minutes.    Feeding tube length to tip of the nose: 110 cm.    PROCEDURE: The patient was placed in the supine position on the  fluoroscopy table. The right nostril was anesthetized with viscous  topical lidocaine. The feeding tube was also lubricated with viscous  lidocaine. The feeding tube was passed through the right nostril but  could  not be passed into the nasopharynx. Therefore, the left nostril  was anesthetized with viscous topical lidocaine, the feeding tube was  then passed through the left nostril, through the esophagus into the  stomach. Using fluoroscopic guidance, the feeding tube was advanced  into the proximal jejunum.    There are were no immediate complications. The patient tolerated the  procedure extremely well.      Impression    IMPRESSION: Successful, uncomplicated, fluoroscopically guided feeding  tube placement.    RK CHILDRESS MD   XR Video Swallow w/o Esophagram    Narrative    VIDEO SWALLOWING EVALUATION May 28, 2019 9:21 AM     HISTORY: Dysphagia.    COMPARISON: None.    FLUOROSCOPY TIME: 0.8 minutes.     Number of cine runs: Five.    FINDINGS:    Thin: Not administered.    Nectar: Moderate penetration. Mild hypopharyngeal residue.    Honey: Moderate hypopharyngeal residue. Moderate penetration during  the swallow. Aspiration of residue without spontaneous cough.    Pudding: Azam aspiration without spontaneous cough. Moderate  hypopharyngeal residue.    Semisolid: Not administered.    Solid: Not administered.    This study only includes the cervical esophagus.    RK CHILDRESS MD   IR Gastrostomy Tube Percutaneous Plcmnt    Narrative    PROCEDURE(S):  1. Nasogastric tube placement.  2. Percutaneous gastrostomy tube placement.      DATE OF PROCEDURE: 5/29/2019 9:05 AM.    MEDICATIONS: 10 mL 1% Lidocaine SQ, Versed 1 mg IV, Fentanyl 50 mcg  IV.    CONTRAST: 8 mL Omnipaque 240 into the gastric lumen.    REFERENCED AIR KERMA: 9.85 mGy.  FLUOROSCOPY TIME: 2.1 minutes.     ESTIMATED BLOOD LOSS: Minimal.    COMPLICATIONS: None.    CLINICAL HISTORY/INDICATION: Dysphagia. Needs feeding tube for  long-term nutrition    PROCEDURES AND FINDINGS: Following a discussion of the risks,  benefits, indications, and alternatives to treatment, informed consent  was obtained. The patient was brought to the interventional radiology  suite  "and placed supine on the table. A limited ultrasound of the  abdomen was performed to localize the liver, which was marked on the  skin. A nasogastric feeding tube was inserted with the tip in the  stomach. The abdomen was then prepped and draped in a routine sterile  fashion. A timeout was performed per hospital universal protocol  policy to ensure correct patient, site, and procedure to be performed.    The stomach was then insufflated with air via the indwelling  nasogastric tube. A suitable site for percutaneous access into the  stomach was marked, and local anesthesia was obtained with 1%  Lidocaine. Needle access into the stomach was obtained. Position was  confirmed by aspiration of gas and dripping of contrast into the  gastric lumen. A T-fastener was deployed through the needle, and the  anterior stomach wall was tacked to the anterior abdominal wall. This  procedure was repeated with a second T-fastener. A 1 cm incision was  made between the T-fasteners, and access into the stomach was obtained  using an 18 G needle. Position was confirmed by aspirating gas and  dripping contrast into the gastric lumen.     A 0.035\" Amplatz wire was then advanced across the needle into the  gastric lumen. The needle was removed and the tract was serially  dilated. Finally, a peel-away sheath was placed and through the sheath  and over the wire, an 14 Italian gastrostomy tube was placed into the  stomach. Position was confirmed by dripping contrast through the tube.  The retention balloon was insufflated with 5 mL of dilute contrast.  The retention disk was cinched to the skin and the T-fasteners were  secured to the skin. A sterile dressing was applied, and the tube was  capped. The nasogastric tube was removed.     Throughout the procedure, the patient was monitored by a radiology  nurse for cardiac rhythm and oxygen saturation which remained stable.  Total moderate sedation time was 20 minutes. The patient tolerated " the  procedure well and left interventional radiology in stable condition.      Impression    IMPRESSION: Placement of a 14 Dutch gastrostomy tube, as detailed  above.    ANASTASIYA POLANCO, DO   CT Head w/o Contrast    Narrative    CT OF THE HEAD WITHOUT CONTRAST 5/31/2019 1:42 PM     COMPARISON: Head CT 5/19/2019    HISTORY: Subdural hematoma follow-up.    TECHNIQUE: 5 mm thick axial CT images of the head were acquired  without IV contrast material.    FINDINGS: Mixed subdural hemorrhage along the right cerebral convexity  measuring up to 2.6 cm in thickness is again noted. Mass effect due to  the subdural hematoma results in 0.7 cm leftward midline shift of the  septum pellucidum which is stable from the comparison study. There is  no definite evidence for new or increasing hemorrhage. There is no  hydrocephalus. There is no evidence for ischemic infarct.    There is moderate diffuse cerebral volume loss. There are subtle  patchy areas of decreased density in the cerebral white matter  bilaterally that are consistent with sequela of chronic small vessel  ischemic disease.    The visualized paranasal sinuses are well aerated. There is no  mastoiditis. There are no fractures of the visualized bones.       Impression    IMPRESSION:   1. Stable subdural hemorrhage along the right cerebral convexity  without evidence for new or increasing intracranial hemorrhage.  2. Stable leftward midline shift of the septum pellucidum measuring  0.7 cm.  3. Diffuse cerebral volume loss and cerebral white matter changes  consistent with chronic small vessel ischemic disease.     Radiation dose for this scan was reduced using automated exposure  control, adjustment of the mA and/or kV according to patient size, or  iterative reconstruction technique.    RK CHILDRESS MD   XR Knee Right 1/2 Views    Narrative    XR KNEE RT 1 /2 VW 6/1/2019 10:25 AM    COMPARISON: None.    HISTORY: Knee pain.      Impression    IMPRESSION:  Tricompartmental RIGHT knee osteophytosis with near  complete loss of joint space in the lateral compartment.  Chondrocalcinosis is also seen in all 3 compartments. Large knee  effusion.    PORTIA DURAN MD       Discharge Medications   Current Discharge Medication List      START taking these medications    Details   acetaminophen (TYLENOL) 325 MG tablet Take 2 tablets (650 mg) by mouth every 4 hours as needed for mild pain    Associated Diagnoses: Acute pain of right knee      insulin aspart (NOVOLOG PEN) 100 UNIT/ML pen Inject 1-12 Units Subcutaneous every 4 hours    Comments: Correction Scale - HIGH INSULIN RESISTANCE DOSING     Do Not give Correction Insulin if BG less than 140  For  - 164 give 1 unit.  For  - 189 give 2 units.  For  - 214 give 3 units.  For  - 239 give 4 units.  For  - 264 give 5 units.  For  - 289 give 6 units.  For  Associated Diagnoses: Diabetes mellitus without complication (H)      insulin glargine (LANTUS SOLOSTAR PEN) 100 UNIT/ML pen Inject 40 Units Subcutaneous daily    Associated Diagnoses: Diabetes mellitus without complication (H)      metoprolol (FIRST-METOPROLOL) 10 MG/ML SOLN 1.25 mLs (12.5 mg) by Per Feeding Tube route 2 times daily    Associated Diagnoses: Benign essential hypertension; Non-sustained ventricular tachycardia (H)         CONTINUE these medications which have CHANGED    Details   amLODIPine (NORVASC) 5 MG tablet 1 tablet (5 mg) by Oral or Feeding Tube route daily    Associated Diagnoses: Benign essential hypertension         CONTINUE these medications which have NOT CHANGED    Details   Calcium Carbonate-Vitamin D (CALCIUM 500 + D) 500-125 MG-UNIT TABS Take 2 tablets by mouth every 24 hours      fish oil-omega-3 fatty acids 1000 MG capsule Take 1 capsule by mouth every 24 hours      levothyroxine (SYNTHROID/LEVOTHROID) 100 MCG tablet Take 100 mcg by mouth daily          STOP taking these medications       pravastatin (PRAVACHOL)  20 MG tablet Comments:   Reason for Stopping:         warfarin (COUMADIN) 3 MG tablet Comments:   Reason for Stopping:             Allergies   No Known Allergies   no

## 2021-02-08 NOTE — PLAN OF CARE
Pt here with right traumatic subdural hematoma. Disoriented to time and place. Generalized weakness. Left sided weakness. VSS. Tele 100% V paced. NPO. Oral care done. Up with 2 with belt and walker. Turned and repositioned q 2hrs. Using urinal to void but also can be incontinent . Denies pain. Bedtime blood sugar 187. Insulin coverage not given due to NPO/no NG tube. Plan NG tube placement tomorrow morning. Discharge to ARU pending.    Yes

## 2023-06-21 NOTE — ED TRIAGE NOTES
Patient arrives via EMS after daughter found him down on his floor at home. Patient lives at home alone and was last seen on Sunday by daughter, therefore unknown time down. Patient was found incontinent of feces and urine. Patient is on warfarin. EMS reports confusion on scene. Patient disoriented to location presently.   No

## 2023-09-13 NOTE — PROVIDER NOTIFICATION
MD Notification    Notified Person: MD    Notified Person Name: Dr. Carrillo    Notification Date/Time:  5/22 0300    Notification Interaction: Paged on call, spoke with MD over phone    Purpose of Notification: 14 beat run of V tach    Orders Received: MD will place orders to check magnesium along with other lab values this AM, will order Lopressor to start today,  continue to monitor    Comments:     Recv'd rt foot x-rays- placed in nurses bin.

## (undated) RX ORDER — LIDOCAINE HYDROCHLORIDE 20 MG/ML
JELLY TOPICAL
Status: DISPENSED
Start: 2019-01-01